# Patient Record
Sex: FEMALE | Race: WHITE | NOT HISPANIC OR LATINO | Employment: OTHER | URBAN - METROPOLITAN AREA
[De-identification: names, ages, dates, MRNs, and addresses within clinical notes are randomized per-mention and may not be internally consistent; named-entity substitution may affect disease eponyms.]

---

## 2019-04-24 ENCOUNTER — HOSPITAL ENCOUNTER (OUTPATIENT)
Dept: URGENT CARE | Facility: CLINIC | Age: 69
Discharge: HOME | End: 2019-04-24
Attending: FAMILY MEDICINE | Admitting: FAMILY MEDICINE

## 2020-06-09 ENCOUNTER — TRANSCRIBE ORDERS (OUTPATIENT)
Dept: PHYSICAL THERAPY | Facility: CLINIC | Age: 70
End: 2020-06-09

## 2020-06-09 DIAGNOSIS — M54.9 ACUTE RIGHT-SIDED BACK PAIN, UNSPECIFIED BACK LOCATION: Primary | ICD-10-CM

## 2020-06-16 ENCOUNTER — TREATMENT (OUTPATIENT)
Dept: PHYSICAL THERAPY | Facility: CLINIC | Age: 70
End: 2020-06-16

## 2020-06-16 DIAGNOSIS — M54.9 ACUTE RIGHT-SIDED BACK PAIN, UNSPECIFIED BACK LOCATION: Primary | ICD-10-CM

## 2020-06-16 PROCEDURE — 97162 PT EVAL MOD COMPLEX 30 MIN: CPT | Performed by: PHYSICAL THERAPIST

## 2020-06-16 PROCEDURE — 97110 THERAPEUTIC EXERCISES: CPT | Performed by: PHYSICAL THERAPIST

## 2020-06-16 NOTE — PROGRESS NOTES
Physical Therapy Initial Evaluation and Plan of Care    Patient: Mariajose Tabor   : 1950  Diagnosis/ICD-10 Code:  Acute right-sided low back pain, unspecified whether sciatica present [M54.5]  Referring practitioner: Didi Talbot MD  Date of Initial Visit: 2020  Today's Date: 2020  Patient seen for 1 sessions           Subjective Questionnaire: Oswestry: 20%      Subjective Evaluation    History of Present Illness  Mechanism of injury: Patient presents to physical therapy with chief complaint of pain in mid-back on right side.  Reports that symptoms started back in February.  Reports that she had a UTI in January and reports that back pain has not gone away since.  Denies mechanism of injury.  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  Patient reports that she was just diagnosed with osteopenia in her hips from DEXA scan. Patient denies pain at night, just has some pain in right shoulder when laying on it. Denies bowel and bladder changes in last 4 months (since back pain has been present). Patient reports that since , she had been sitting more.  Reports that she has recently been walking more and states she is feeling better.  States that her pain comes on gradually and she is forced to stop and take a break from activities to rest. Patient wishes to return to ADL's and walking/caring for grandchildren with less pain and limitation.  Denies N&T in BUE's and BLE's.  Reports some weakness and pain in right shoulder that has been present for the past 5 years.     Quality of life: good    Pain  Current pain ratin  At worst pain ratin  Location: right side thoracic spine that moves into underside of scapula  Quality: dull ache  Relieving factors: support and rest  Aggravating factors: sleeping, movement and squatting  Progression: no change    Social Support  Lives with: spouse    Diagnostic Tests  X-ray: abnormal    Patient Goals  Patient goals  for therapy: decreased pain and independence with ADLs/IADLs             Objective          Postural Observations  Seated posture: fair        Palpation     Right Tenderness of the rhomboids and teres major.     Active Range of Motion   Cervical/Thoracic Spine   Normal active range of motion    Thoracic   Left lateral flexion: with pain    Scapular Mobility   Left Shoulder   Scapular Mobility with Shoulder to 90° FF   Scapular winging: moderate    Scapular Mobility beyond 90° FF   Scapular winging: moderate    Right Shoulder   Scapular Mobility with Shoulder to 90° FF   Scapular winging: moderate    Scapular Mobility beyond 90° FF   Scapular winging: moderate  Upward rotation: excessive    Strength/Myotome Testing     Left Shoulder     Planes of Motion   Flexion: 4   Extension: 5   Abduction: 4     Right Shoulder     Planes of Motion   Flexion: 4   Extension: 5   Abduction: 4     Additional Strength Details   R: 40lbs    L: 45lbs           Assessment & Plan     Assessment  Impairments: abnormal or restricted ROM, activity intolerance, impaired physical strength, lacks appropriate home exercise program and pain with function  Assessment details: Patient presents to physical therapy with s/s congruent with [facility] diagnosis of back pain.  Patient demonstrates restricted mobility in thoracic spine, weakness in BUE's and tenderness to palpation in thoracic spine.  Patient would benefit from skilled physical therapy intervention in order to address these deficits so that she may complete ADL's with less pain and limitation.   Prognosis: good  Functional Limitations: carrying objects, lifting, walking, uncomfortable because of pain, sitting and standing  Goals  Plan Goals: In two weeks, patient will report at least 25% reduction in pain level in thoracic spine.   In two weeks, patient will demonstrate no restriction in AROM in thoracic spine.     In four weeks, patient will demonstrate 5/5 muscular strength in BUE's.    In four weeks, patient will report tolerating standing for at least 1 hour before having to sit due to back pain.   In four weeks, patient will report full day of no pain in thoracic spine.   In four weeks, patient will demonstrate decreased perceived disability by decreasing score on Oswestry by at least 10%. (20% on eval)    Plan  Therapy options: will be seen for skilled physical therapy services  Planned modality interventions: cryotherapy, TENS and thermotherapy (hydrocollator packs)  Planned therapy interventions: manual therapy, neuromuscular re-education, soft tissue mobilization, strengthening, therapeutic activities, home exercise program and functional ROM exercises  Frequency: 2x week  Duration in visits: 20        Manual Therapy:         mins  69928;  Therapeutic Exercise:    25     mins  75645;     Neuromuscular Yuliya:        mins  02288;    Therapeutic Activity:          mins  18562;     Gait Training:           mins  35622;     Ultrasound:          mins  63853;    Electrical Stimulation:         mins  75332 ( );  Dry Needling          mins self-pay    Timed Treatment:   25   mins   Total Treatment:     60   mins    PT SIGNATURE: Palmer Almendarez PT   DATE TREATMENT INITIATED: 6/16/2020    Initial Certification  Certification Period: 9/14/2020  I certify that the therapy services are furnished while this patient is under my care.  The services outlined above are required by this patient, and will be reviewed every 90 days.     PHYSICIAN: Didi Talbot MD      DATE:     Please sign and return via fax to [phone].. Thank you, [facility].

## 2020-06-22 ENCOUNTER — TREATMENT (OUTPATIENT)
Dept: PHYSICAL THERAPY | Facility: CLINIC | Age: 70
End: 2020-06-22

## 2020-06-22 DIAGNOSIS — M54.9 ACUTE RIGHT-SIDED BACK PAIN, UNSPECIFIED BACK LOCATION: Primary | ICD-10-CM

## 2020-06-22 PROCEDURE — 97110 THERAPEUTIC EXERCISES: CPT | Performed by: PHYSICAL THERAPIST

## 2020-06-22 PROCEDURE — 97140 MANUAL THERAPY 1/> REGIONS: CPT | Performed by: PHYSICAL THERAPIST

## 2020-06-22 NOTE — PROGRESS NOTES
Physical Therapy Daily Progress Note       Patient: Mariajose Tabor   : 1950  Diagnosis/ICD-10 Code:  Acute right-sided back pain, unspecified back location [M54.9]  Referring practitioner: Didi Talbot MD  Date of Initial Visit: Type: THERAPY  Noted: 2020  Today's Date: 2020  Patient seen for 2 sessions         Mariajose Tabor reports:  Having some pain in mid-back on right side.  Overall feeling better since starting exercise.     Objective   See Exercise, Manual, and Modality Logs for complete treatment.       Assessment/Plan     Tolerates manual therapy and exercise well this visit.  Requires moderate level of cueing for proper technique with scapular retraction.  Progressing well.     Progress per Plan of Care           Manual Therapy:    15     mins  35115;  Therapeutic Exercise:    25     mins  83698;     Neuromuscular Yuliya:        mins  66092;    Therapeutic Activity:          mins  91944;     Gait Training:           mins  11600;     Ultrasound:          mins  32006;    Electrical Stimulation:         mins  46453 ( );  Dry Needling          mins self-pay    Timed Treatment:   40   mins   Total Treatment:     40   mins    Palmer Almendarez PT  Physical Therapist

## 2020-06-24 ENCOUNTER — TREATMENT (OUTPATIENT)
Dept: PHYSICAL THERAPY | Facility: CLINIC | Age: 70
End: 2020-06-24

## 2020-06-24 DIAGNOSIS — M54.9 ACUTE RIGHT-SIDED BACK PAIN, UNSPECIFIED BACK LOCATION: Primary | ICD-10-CM

## 2020-06-24 PROCEDURE — 97110 THERAPEUTIC EXERCISES: CPT | Performed by: PHYSICAL THERAPIST

## 2020-06-24 NOTE — PROGRESS NOTES
Physical Therapy Daily Progress Note    Patient: Mariajose Tabor   : 1950  Diagnosis/ICD-10 Code:  Acute right-sided back pain, unspecified back location [M54.9]  Referring practitioner: Didi Tlabot MD  Date of Initial Visit: Type: THERAPY  Noted: 2020  Today's Date: 2020  Patient seen for 3 sessions         Mariajose Tabor reports: No pain reported since previous treatment.  Reports exercise has been going well and feels that she has recovered nicely.     Objective   See Exercise, Manual, and Modality Logs for complete treatment.       Assessment/Plan     Demonstrates proper technique with newly added home exercises.  Patient to continue with I HEP and return for treatment due to meeting goals and needing time for caring for .     Progress per Plan of Care           Manual Therapy:         mins  59040;  Therapeutic Exercise:    55     mins  59828;     Neuromuscular Yuliya:        mins  02087;    Therapeutic Activity:          mins  09025;     Gait Training:           mins  46869;     Ultrasound:          mins  06173;    Electrical Stimulation:         mins  41710 ( );  Dry Needling          mins self-pay    Timed Treatment:   55   mins   Total Treatment:     55   mins    Palmer Almendarez PT  Physical Therapist

## 2020-07-13 ENCOUNTER — TREATMENT (OUTPATIENT)
Dept: PHYSICAL THERAPY | Facility: CLINIC | Age: 70
End: 2020-07-13

## 2020-07-13 DIAGNOSIS — M54.9 ACUTE RIGHT-SIDED BACK PAIN, UNSPECIFIED BACK LOCATION: Primary | ICD-10-CM

## 2020-07-13 PROCEDURE — 97140 MANUAL THERAPY 1/> REGIONS: CPT | Performed by: PHYSICAL THERAPIST

## 2020-07-13 PROCEDURE — 97110 THERAPEUTIC EXERCISES: CPT | Performed by: PHYSICAL THERAPIST

## 2020-07-13 NOTE — PROGRESS NOTES
Physical Therapy Daily Progress Note    Patient: Mariajose Tabor   : 1950  Diagnosis/ICD-10 Code:  Acute right-sided back pain, unspecified back location [M54.9]  Referring practitioner: Didi Talbot MD  Date of Initial Visit: Type: THERAPY  Noted: 2020  Today's Date: 2020  Patient seen for 4 sessions         Mariajose Tabor reports:  Has been feeling pretty well, however had pain flare up again a few times last week.  Feeling well today, however wanted to do a few more treatments in order to avoid pain from returning.     Objective   See Exercise, Manual, and Modality Logs for complete treatment.       Assessment/Plan     Tolerates manual therapy well.  Noted soft tissue restriction medial to medial border to right scapula.  Patient requires 2-3 verbal cues for proper technique with exercises.  Progressing well.     Progress per Plan of Care           Manual Therapy:    15     mins  20878;  Therapeutic Exercise:    25     mins  32473;     Neuromuscular Yuliya:        mins  81016;    Therapeutic Activity:          mins  99990;     Gait Training:           mins  56269;     Ultrasound:          mins  71778;    Electrical Stimulation:         mins  89353 ( );  Dry Needling          mins self-pay    Timed Treatment:   40   mins   Total Treatment:     40   mins    Palmer Almendarez PT  Physical Therapist

## 2020-07-15 ENCOUNTER — TREATMENT (OUTPATIENT)
Dept: PHYSICAL THERAPY | Facility: CLINIC | Age: 70
End: 2020-07-15

## 2020-07-15 DIAGNOSIS — M54.9 ACUTE RIGHT-SIDED BACK PAIN, UNSPECIFIED BACK LOCATION: Primary | ICD-10-CM

## 2020-07-15 PROCEDURE — 97140 MANUAL THERAPY 1/> REGIONS: CPT | Performed by: PHYSICAL THERAPIST

## 2020-07-15 PROCEDURE — 97110 THERAPEUTIC EXERCISES: CPT | Performed by: PHYSICAL THERAPIST

## 2020-07-15 NOTE — PROGRESS NOTES
Physical Therapy Daily Progress Note        Patient: Mariajose Tabor   : 1950  Diagnosis/ICD-10 Code:  Acute right-sided back pain, unspecified back location [M54.9]  Referring practitioner: Didi Talbot MD  Date of Initial Visit: Type: THERAPY  Noted: 2020  Today's Date: 7/15/2020  Patient seen for 5 sessions         Mariajose Tabor reports:  Feeling well.  No pain in middle of back/right scapula.  Did not have to use self-massage with tennis ball technique.     Objective   See Exercise, Manual, and Modality Logs for complete treatment.       Assessment/Plan     Tolerate manual therapy to thoracic spine well.  Patient requires minimal cueing for proper technique with HEP    D/C to I HEP, follow-up if s/s return            Manual Therapy:    15     mins  51162;  Therapeutic Exercise:    25     mins  03044;     Neuromuscular Yuliya:        mins  14673;    Therapeutic Activity:          mins  36272;     Gait Training:           mins  76623;     Ultrasound:          mins  96465;    Electrical Stimulation:         mins  68074 ( );  Dry Needling          mins self-pay    Timed Treatment:  40   mins   Total Treatment:     40   mins    Palmer Almendarez PT  Physical Therapist

## 2020-08-03 ENCOUNTER — TREATMENT (OUTPATIENT)
Dept: PHYSICAL THERAPY | Facility: CLINIC | Age: 70
End: 2020-08-03

## 2020-08-03 DIAGNOSIS — M54.9 ACUTE RIGHT-SIDED BACK PAIN, UNSPECIFIED BACK LOCATION: Primary | ICD-10-CM

## 2020-08-03 PROCEDURE — 97140 MANUAL THERAPY 1/> REGIONS: CPT | Performed by: PHYSICAL THERAPIST

## 2020-08-03 PROCEDURE — 97110 THERAPEUTIC EXERCISES: CPT | Performed by: PHYSICAL THERAPIST

## 2020-08-03 NOTE — PROGRESS NOTES
Physical Therapy Daily Progress Note      Patient: Mariajose Tabor   : 1950  Diagnosis/ICD-10 Code:  Acute right-sided back pain, unspecified back location [M54.9]  Referring practitioner: Didi Talbot MD  Date of Initial Visit: Type: THERAPY  Noted: 2020  Today's Date: 8/3/2020  Patient seen for 6 sessions         Mariajose Tabor reports:  Pain in middle of back and right shoulder flared-up a little last week.  Reports that she's feeling a little better over the past few days.  Reports compliance with HEP.      Objective   See Exercise, Manual, and Modality Logs for complete treatment.       Assessment/Plan     Decreased discomfort reported post-tx.  Patient demonstrates proper technique with home exercises and exercise completed in clinic.  Progressing well.     Progress per Plan of Care           Manual Therapy:    15     mins  07840;  Therapeutic Exercise:    15     mins  03318;     Neuromuscular Yuliya:        mins  28628;    Therapeutic Activity:          mins  43333;     Gait Training:           mins  61036;     Ultrasound:          mins  22068;    Electrical Stimulation:         mins  61289 ( );  Dry Needling          mins self-pay    Timed Treatment:   30   mins   Total Treatment:     30   mins    Palmer Almendarez PT  Physical Therapist

## 2020-08-05 ENCOUNTER — TREATMENT (OUTPATIENT)
Dept: PHYSICAL THERAPY | Facility: CLINIC | Age: 70
End: 2020-08-05

## 2020-08-05 DIAGNOSIS — M54.9 ACUTE RIGHT-SIDED BACK PAIN, UNSPECIFIED BACK LOCATION: Primary | ICD-10-CM

## 2020-08-05 PROCEDURE — 97140 MANUAL THERAPY 1/> REGIONS: CPT | Performed by: PHYSICAL THERAPIST

## 2020-08-05 NOTE — PROGRESS NOTES
Physical Therapy Daily Progress Note    Patient: Mariajose Tabor   : 1950  Diagnosis/ICD-10 Code:  Acute right-sided back pain, unspecified back location [M54.9]  Referring practitioner: Didi Talbot MD  Date of Initial Visit: Type: THERAPY  Noted: 2020  Today's Date: 2020  Patient seen for 7 sessions         Mariajose Tabor reports: Feeling well this week.  Decreased symptoms since previous session.     Objective   See Exercise, Manual, and Modality Logs for complete treatment.       Assessment/Plan     Tolerates manual therapy well.  Discussed postural awareness.  Patient progressing well towards meeting goals for pain reduction.    Progress per Plan of Care           Manual Therapy:    25     mins  48047;  Therapeutic Exercise:    5     mins  25506;     Neuromuscular Yuliya:        mins  95805;    Therapeutic Activity:          mins  23999;     Gait Training:           mins  13399;     Ultrasound:          mins  25327;    Electrical Stimulation:         mins  34879 ( );  Dry Needling          mins self-pay    Timed Treatment:   30   mins   Total Treatment:     40   mins    Palmer Almendarez PT  Physical Therapist

## 2020-08-10 ENCOUNTER — TREATMENT (OUTPATIENT)
Dept: PHYSICAL THERAPY | Facility: CLINIC | Age: 70
End: 2020-08-10

## 2020-08-10 DIAGNOSIS — M54.9 ACUTE RIGHT-SIDED BACK PAIN, UNSPECIFIED BACK LOCATION: Primary | ICD-10-CM

## 2020-08-10 PROCEDURE — 97140 MANUAL THERAPY 1/> REGIONS: CPT | Performed by: PHYSICAL THERAPIST

## 2020-08-12 ENCOUNTER — TREATMENT (OUTPATIENT)
Dept: PHYSICAL THERAPY | Facility: CLINIC | Age: 70
End: 2020-08-12

## 2020-08-12 DIAGNOSIS — M54.9 ACUTE RIGHT-SIDED BACK PAIN, UNSPECIFIED BACK LOCATION: Primary | ICD-10-CM

## 2020-08-12 PROCEDURE — 97140 MANUAL THERAPY 1/> REGIONS: CPT | Performed by: PHYSICAL THERAPIST

## 2020-08-12 NOTE — PROGRESS NOTES
Physical Therapy Daily Progress Note    Patient: Mariajose Tabor   : 1950  Diagnosis/ICD-10 Code:  Acute right-sided back pain, unspecified back location [M54.9]  Referring practitioner: Didi Talbot MD  Date of Initial Visit: Type: THERAPY  Noted: 2020  Today's Date: 2020  Patient seen for 9 sessions         Mariajose Tabor reports:  Increased soreness in middle of back over the past few days.     Objective   See Exercise, Manual, and Modality Logs for complete treatment.       Assessment/Plan     Tolerates manual therapy well this visit.  Decreased pain reported post-tx.      Progress per Plan of Care           Manual Therapy:    25     mins  43475;  Therapeutic Exercise:         mins  13232;     Neuromuscular Yuliya:        mins  36201;    Therapeutic Activity:          mins  00416;     Gait Training:           mins  65523;     Ultrasound:          mins  60421;    Electrical Stimulation:         mins  22852 ( );  Dry Needling          mins self-pay    Timed Treatment:   25   mins   Total Treatment:     35   mins    Palmer Almendarez PT  Physical Therapist

## 2020-08-17 ENCOUNTER — TREATMENT (OUTPATIENT)
Dept: PHYSICAL THERAPY | Facility: CLINIC | Age: 70
End: 2020-08-17

## 2020-08-17 DIAGNOSIS — M54.9 ACUTE RIGHT-SIDED BACK PAIN, UNSPECIFIED BACK LOCATION: Primary | ICD-10-CM

## 2020-08-17 PROCEDURE — 97140 MANUAL THERAPY 1/> REGIONS: CPT | Performed by: PHYSICAL THERAPIST

## 2020-08-17 NOTE — PROGRESS NOTES
Physical Therapy Daily Progress Note    Patient: Mariajose Tabor   : 1950  Diagnosis/ICD-10 Code:  Acute right-sided back pain, unspecified back location [M54.9]  Referring practitioner: Didi Talbot MD  Date of Initial Visit: Type: THERAPY  Noted: 2020  Today's Date: 2020  Patient seen for 10 sessions         Mariajose Tabor reports: Had a few days with increased pain in right scapula since last visit.  Reports that symptoms seem to increase after activities such as household chores.    Objective   See Exercise, Manual, and Modality Logs for complete treatment.     Goals:   In two weeks, patient will report at least 25% reduction in pain level in thoracic spine. met  In two weeks, patient will demonstrate no restriction in AROM in thoracic spine. met    In four weeks, patient will demonstrate 5/5 muscular strength in BUE's. met  In four weeks, patient will report tolerating standing for at least 1 hour before having to sit due to back pain. met  In four weeks, patient will report full day of no pain in thoracic spine. NM  In four weeks, patient will demonstrate decreased perceived disability by decreasing score on Oswestry by at least 10%. (20% on eval) NM    Assessment/Plan    Patient tolerates manual therapy well.  Decreased pain reported post-tx.  Patient has met all but two LTG's.  Progressing well.     Progress per Plan of Care           Manual Therapy:    25     mins  55230;  Therapeutic Exercise:    5     mins  82441;     Neuromuscular Yuliya:        mins  92930;    Therapeutic Activity:          mins  80445;     Gait Training:           mins  79909;     Ultrasound:          mins  42668;    Electrical Stimulation:         mins  20573 ( );  Dry Needling          mins self-pay    Timed Treatment:   30   mins   Total Treatment:     40   mins    Palmer Almendarez PT  Physical Therapist

## 2020-08-24 ENCOUNTER — TREATMENT (OUTPATIENT)
Dept: PHYSICAL THERAPY | Facility: CLINIC | Age: 70
End: 2020-08-24

## 2020-08-24 DIAGNOSIS — M54.9 ACUTE RIGHT-SIDED BACK PAIN, UNSPECIFIED BACK LOCATION: Primary | ICD-10-CM

## 2020-08-24 PROCEDURE — 97140 MANUAL THERAPY 1/> REGIONS: CPT | Performed by: PHYSICAL THERAPIST

## 2020-08-24 NOTE — PROGRESS NOTES
Physical Therapy Daily Progress Note      Patient: Mariajose Tabor   : 1950  Diagnosis/ICD-10 Code:  Acute right-sided back pain, unspecified back location [M54.9]  Referring practitioner: Didi Talbot MD  Date of Initial Visit: Type: THERAPY  Noted: 2020  Today's Date: 2020  Patient seen for 11 sessions         Mariajose Tabor reports: Felt better over the weekend, however states that still has some pain on right side of cervical spine that mildly increases when she takes a deep breath.      Objective   See Exercise, Manual, and Modality Logs for complete treatment.       Assessment/Plan     Tolerates manual therapy well.  Patient to continue with I HEP over the rest of the week and assess symptoms.     Progress per Plan of Care           Manual Therapy:    30     mins  72547;  Therapeutic Exercise:         mins  01430;     Neuromuscular Yuliya:        mins  96728;    Therapeutic Activity:          mins  76151;     Gait Training:           mins  97582;     Ultrasound:          mins  06969;    Electrical Stimulation:         mins  02030 ( );  Dry Needling          mins self-pay    Timed Treatment:   30   mins   Total Treatment:     40   mins    Palmer Almendarez PT  Physical Therapist

## 2021-03-01 ENCOUNTER — AMBULATORY SURGICAL CENTER (OUTPATIENT)
Dept: URBAN - METROPOLITAN AREA SURGERY 17 | Facility: SURGERY | Age: 71
End: 2021-03-01

## 2021-03-01 ENCOUNTER — HOSPITAL ENCOUNTER (INPATIENT)
Facility: HOSPITAL | Age: 71
LOS: 7 days | Discharge: HOME | End: 2021-03-10
Attending: FAMILY MEDICINE | Admitting: FAMILY MEDICINE

## 2021-03-01 ENCOUNTER — OFFICE (OUTPATIENT)
Dept: URBAN - METROPOLITAN AREA PATHOLOGY 4 | Facility: PATHOLOGY | Age: 71
End: 2021-03-01

## 2021-03-01 ENCOUNTER — APPOINTMENT (OUTPATIENT)
Dept: CT IMAGING | Facility: HOSPITAL | Age: 71
End: 2021-03-01

## 2021-03-01 VITALS
RESPIRATION RATE: 23 BRPM | RESPIRATION RATE: 32 BRPM | RESPIRATION RATE: 28 BRPM | TEMPERATURE: 96 F | RESPIRATION RATE: 14 BRPM | OXYGEN SATURATION: 95 % | DIASTOLIC BLOOD PRESSURE: 102 MMHG | DIASTOLIC BLOOD PRESSURE: 40 MMHG | SYSTOLIC BLOOD PRESSURE: 139 MMHG | DIASTOLIC BLOOD PRESSURE: 28 MMHG | DIASTOLIC BLOOD PRESSURE: 51 MMHG | HEART RATE: 72 BPM | DIASTOLIC BLOOD PRESSURE: 46 MMHG | OXYGEN SATURATION: 94 % | RESPIRATION RATE: 22 BRPM | RESPIRATION RATE: 3 BRPM | HEART RATE: 95 BPM | SYSTOLIC BLOOD PRESSURE: 119 MMHG | SYSTOLIC BLOOD PRESSURE: 121 MMHG | HEART RATE: 84 BPM | SYSTOLIC BLOOD PRESSURE: 110 MMHG | DIASTOLIC BLOOD PRESSURE: 58 MMHG | DIASTOLIC BLOOD PRESSURE: 52 MMHG | DIASTOLIC BLOOD PRESSURE: 55 MMHG | SYSTOLIC BLOOD PRESSURE: 129 MMHG | SYSTOLIC BLOOD PRESSURE: 125 MMHG | HEART RATE: 73 BPM | OXYGEN SATURATION: 96 % | OXYGEN SATURATION: 93 % | SYSTOLIC BLOOD PRESSURE: 120 MMHG | WEIGHT: 142 LBS | DIASTOLIC BLOOD PRESSURE: 48 MMHG | HEART RATE: 74 BPM | HEART RATE: 81 BPM | HEART RATE: 71 BPM | RESPIRATION RATE: 24 BRPM | DIASTOLIC BLOOD PRESSURE: 68 MMHG | HEART RATE: 85 BPM | SYSTOLIC BLOOD PRESSURE: 109 MMHG | DIASTOLIC BLOOD PRESSURE: 44 MMHG | SYSTOLIC BLOOD PRESSURE: 111 MMHG | DIASTOLIC BLOOD PRESSURE: 56 MMHG | OXYGEN SATURATION: 100 % | SYSTOLIC BLOOD PRESSURE: 94 MMHG | SYSTOLIC BLOOD PRESSURE: 107 MMHG | RESPIRATION RATE: 18 BRPM | TEMPERATURE: 97 F | DIASTOLIC BLOOD PRESSURE: 54 MMHG | SYSTOLIC BLOOD PRESSURE: 114 MMHG | RESPIRATION RATE: 21 BRPM | HEART RATE: 91 BPM | HEIGHT: 63 IN | OXYGEN SATURATION: 99 % | RESPIRATION RATE: 17 BRPM | RESPIRATION RATE: 26 BRPM | HEART RATE: 80 BPM

## 2021-03-01 DIAGNOSIS — D12.2 BENIGN NEOPLASM OF ASCENDING COLON: ICD-10-CM

## 2021-03-01 DIAGNOSIS — K66.8 FREE INTRAPERITONEAL AIR: ICD-10-CM

## 2021-03-01 DIAGNOSIS — K63.1 BOWEL PERFORATION (HCC): ICD-10-CM

## 2021-03-01 DIAGNOSIS — Z86.010 PERSONAL HISTORY OF COLONIC POLYPS: ICD-10-CM

## 2021-03-01 DIAGNOSIS — R10.84 GENERALIZED ABDOMINAL PAIN: Primary | ICD-10-CM

## 2021-03-01 LAB
ALBUMIN SERPL-MCNC: 3.8 G/DL (ref 3.5–5.2)
ALBUMIN/GLOB SERPL: 1.6 G/DL
ALP SERPL-CCNC: 64 U/L (ref 39–117)
ALT SERPL W P-5'-P-CCNC: 11 U/L (ref 1–33)
ANION GAP SERPL CALCULATED.3IONS-SCNC: 9 MMOL/L (ref 5–15)
AST SERPL-CCNC: 20 U/L (ref 1–32)
BASOPHILS # BLD AUTO: 0 10*3/MM3 (ref 0–0.2)
BASOPHILS NFR BLD AUTO: 0.2 % (ref 0–1.5)
BILIRUB SERPL-MCNC: 0.3 MG/DL (ref 0–1.2)
BUN SERPL-MCNC: 8 MG/DL (ref 8–23)
BUN/CREAT SERPL: 11.8 (ref 7–25)
CALCIUM SPEC-SCNC: 8.1 MG/DL (ref 8.6–10.5)
CHLORIDE SERPL-SCNC: 106 MMOL/L (ref 98–107)
CO2 SERPL-SCNC: 25 MMOL/L (ref 22–29)
CREAT SERPL-MCNC: 0.68 MG/DL (ref 0.57–1)
D-LACTATE SERPL-SCNC: 0.7 MMOL/L (ref 0.5–2)
DEPRECATED RDW RBC AUTO: 47.7 FL (ref 37–54)
EOSINOPHIL # BLD AUTO: 0 10*3/MM3 (ref 0–0.4)
EOSINOPHIL NFR BLD AUTO: 0.2 % (ref 0.3–6.2)
ERYTHROCYTE [DISTWIDTH] IN BLOOD BY AUTOMATED COUNT: 14.9 % (ref 12.3–15.4)
GFR SERPL CREATININE-BSD FRML MDRD: 86 ML/MIN/1.73
GI HISTOLOGY: A. UNSPECIFIED: (no result)
GI HISTOLOGY: PDF REPORT: (no result)
GLOBULIN UR ELPH-MCNC: 2.4 GM/DL
GLUCOSE SERPL-MCNC: 83 MG/DL (ref 65–99)
HCT VFR BLD AUTO: 38.6 % (ref 34–46.6)
HGB BLD-MCNC: 12.7 G/DL (ref 12–15.9)
LYMPHOCYTES # BLD AUTO: 0.4 10*3/MM3 (ref 0.7–3.1)
LYMPHOCYTES NFR BLD AUTO: 4.6 % (ref 19.6–45.3)
MCH RBC QN AUTO: 30.7 PG (ref 26.6–33)
MCHC RBC AUTO-ENTMCNC: 33 G/DL (ref 31.5–35.7)
MCV RBC AUTO: 93.1 FL (ref 79–97)
MONOCYTES # BLD AUTO: 0.3 10*3/MM3 (ref 0.1–0.9)
MONOCYTES NFR BLD AUTO: 3.7 % (ref 5–12)
NEUTROPHILS NFR BLD AUTO: 7.1 10*3/MM3 (ref 1.7–7)
NEUTROPHILS NFR BLD AUTO: 91.3 % (ref 42.7–76)
NRBC BLD AUTO-RTO: 0.1 /100 WBC (ref 0–0.2)
PLATELET # BLD AUTO: 300 10*3/MM3 (ref 140–450)
PMV BLD AUTO: 6.7 FL (ref 6–12)
POTASSIUM SERPL-SCNC: 3.6 MMOL/L (ref 3.5–5.2)
PROT SERPL-MCNC: 6.2 G/DL (ref 6–8.5)
RBC # BLD AUTO: 4.15 10*6/MM3 (ref 3.77–5.28)
SARS-COV-2 RNA PNL SPEC NAA+PROBE: NOT DETECTED
SODIUM SERPL-SCNC: 140 MMOL/L (ref 136–145)
WBC # BLD AUTO: 7.8 10*3/MM3 (ref 3.4–10.8)

## 2021-03-01 PROCEDURE — G0378 HOSPITAL OBSERVATION PER HR: HCPCS

## 2021-03-01 PROCEDURE — 88305 TISSUE EXAM BY PATHOLOGIST: CPT | Performed by: INTERNAL MEDICINE

## 2021-03-01 PROCEDURE — 25010000002 PIPERACILLIN SOD-TAZOBACTAM PER 1 G: Performed by: NURSE PRACTITIONER

## 2021-03-01 PROCEDURE — U0005 INFEC AGEN DETEC AMPLI PROBE: HCPCS | Performed by: NURSE PRACTITIONER

## 2021-03-01 PROCEDURE — 25010000002 HYDROMORPHONE PER 4 MG: Performed by: NURSE PRACTITIONER

## 2021-03-01 PROCEDURE — U0003 INFECTIOUS AGENT DETECTION BY NUCLEIC ACID (DNA OR RNA); SEVERE ACUTE RESPIRATORY SYNDROME CORONAVIRUS 2 (SARS-COV-2) (CORONAVIRUS DISEASE [COVID-19]), AMPLIFIED PROBE TECHNIQUE, MAKING USE OF HIGH THROUGHPUT TECHNOLOGIES AS DESCRIBED BY CMS-2020-01-R: HCPCS | Performed by: NURSE PRACTITIONER

## 2021-03-01 PROCEDURE — 74177 CT ABD & PELVIS W/CONTRAST: CPT

## 2021-03-01 PROCEDURE — 85025 COMPLETE CBC W/AUTO DIFF WBC: CPT | Performed by: NURSE PRACTITIONER

## 2021-03-01 PROCEDURE — 83605 ASSAY OF LACTIC ACID: CPT | Performed by: FAMILY MEDICINE

## 2021-03-01 PROCEDURE — 25010000002 IOPAMIDOL 61 % SOLUTION: Performed by: NURSE PRACTITIONER

## 2021-03-01 PROCEDURE — 99284 EMERGENCY DEPT VISIT MOD MDM: CPT

## 2021-03-01 PROCEDURE — 80053 COMPREHEN METABOLIC PANEL: CPT | Performed by: NURSE PRACTITIONER

## 2021-03-01 PROCEDURE — 45385 COLONOSCOPY W/LESION REMOVAL: CPT | Mod: PT | Performed by: INTERNAL MEDICINE

## 2021-03-01 PROCEDURE — 25010000002 ONDANSETRON PER 1 MG: Performed by: NURSE PRACTITIONER

## 2021-03-01 RX ORDER — ONDANSETRON 2 MG/ML
4 INJECTION INTRAMUSCULAR; INTRAVENOUS ONCE
Status: COMPLETED | OUTPATIENT
Start: 2021-03-01 | End: 2021-03-01

## 2021-03-01 RX ORDER — BENAZEPRIL HYDROCHLORIDE 20 MG/1
20 TABLET ORAL DAILY
COMMUNITY

## 2021-03-01 RX ORDER — FLUTICASONE PROPIONATE 50 MCG
1 SPRAY, SUSPENSION (ML) NASAL DAILY
COMMUNITY
End: 2021-03-10 | Stop reason: HOSPADM

## 2021-03-01 RX ORDER — PROMETHAZINE HYDROCHLORIDE 12.5 MG/1
12.5 SUPPOSITORY RECTAL EVERY 6 HOURS PRN
Status: DISCONTINUED | OUTPATIENT
Start: 2021-03-01 | End: 2021-03-10 | Stop reason: HOSPADM

## 2021-03-01 RX ORDER — HYDROMORPHONE HCL 110MG/55ML
0.5 PATIENT CONTROLLED ANALGESIA SYRINGE INTRAVENOUS
Status: DISCONTINUED | OUTPATIENT
Start: 2021-03-01 | End: 2021-03-10 | Stop reason: HOSPADM

## 2021-03-01 RX ORDER — SODIUM CHLORIDE, SODIUM LACTATE, POTASSIUM CHLORIDE, CALCIUM CHLORIDE 600; 310; 30; 20 MG/100ML; MG/100ML; MG/100ML; MG/100ML
100 INJECTION, SOLUTION INTRAVENOUS CONTINUOUS
Status: DISCONTINUED | OUTPATIENT
Start: 2021-03-01 | End: 2021-03-02

## 2021-03-01 RX ORDER — ASPIRIN 81 MG/1
81 TABLET ORAL DAILY
COMMUNITY
End: 2021-07-07 | Stop reason: HOSPADM

## 2021-03-01 RX ORDER — MULTIPLE VITAMINS W/ MINERALS TAB 9MG-400MCG
1 TAB ORAL DAILY
COMMUNITY
End: 2021-03-10 | Stop reason: HOSPADM

## 2021-03-01 RX ORDER — GLUCOSAMINE/D3/BOSWELLIA SERRA 1500MG-400
5000 TABLET ORAL DAILY
COMMUNITY
End: 2021-03-10 | Stop reason: HOSPADM

## 2021-03-01 RX ORDER — CLOBETASOL PROPIONATE 0.5 MG/G
CREAM TOPICAL NIGHTLY
COMMUNITY

## 2021-03-01 RX ORDER — SODIUM CHLORIDE 0.9 % (FLUSH) 0.9 %
3-10 SYRINGE (ML) INJECTION AS NEEDED
Status: DISCONTINUED | OUTPATIENT
Start: 2021-03-01 | End: 2021-03-10 | Stop reason: HOSPADM

## 2021-03-01 RX ORDER — SODIUM CHLORIDE 0.9 % (FLUSH) 0.9 %
3 SYRINGE (ML) INJECTION EVERY 12 HOURS SCHEDULED
Status: DISCONTINUED | OUTPATIENT
Start: 2021-03-01 | End: 2021-03-10 | Stop reason: HOSPADM

## 2021-03-01 RX ORDER — MELOXICAM 15 MG/1
15 TABLET ORAL DAILY
COMMUNITY
End: 2021-07-07 | Stop reason: HOSPADM

## 2021-03-01 RX ORDER — OMEPRAZOLE 40 MG/1
40 CAPSULE, DELAYED RELEASE ORAL DAILY
COMMUNITY
End: 2021-07-07 | Stop reason: HOSPADM

## 2021-03-01 RX ORDER — HYDROMORPHONE HCL 110MG/55ML
0.5 PATIENT CONTROLLED ANALGESIA SYRINGE INTRAVENOUS ONCE
Status: COMPLETED | OUTPATIENT
Start: 2021-03-01 | End: 2021-03-01

## 2021-03-01 RX ORDER — NITROGLYCERIN 0.4 MG/1
0.4 TABLET SUBLINGUAL
Status: DISCONTINUED | OUTPATIENT
Start: 2021-03-01 | End: 2021-03-10 | Stop reason: HOSPADM

## 2021-03-01 RX ORDER — SODIUM CHLORIDE 0.9 % (FLUSH) 0.9 %
10 SYRINGE (ML) INJECTION AS NEEDED
Status: DISCONTINUED | OUTPATIENT
Start: 2021-03-01 | End: 2021-03-10 | Stop reason: HOSPADM

## 2021-03-01 RX ORDER — FAMOTIDINE 10 MG/ML
20 INJECTION, SOLUTION INTRAVENOUS EVERY 12 HOURS SCHEDULED
Status: DISCONTINUED | OUTPATIENT
Start: 2021-03-01 | End: 2021-03-04

## 2021-03-01 RX ORDER — MAGNESIUM SULFATE HEPTAHYDRATE 40 MG/ML
2 INJECTION, SOLUTION INTRAVENOUS AS NEEDED
Status: DISCONTINUED | OUTPATIENT
Start: 2021-03-01 | End: 2021-03-10 | Stop reason: HOSPADM

## 2021-03-01 RX ORDER — MULTIVIT-MIN/IRON/FOLIC ACID/K 18-600-40
500 CAPSULE ORAL DAILY
COMMUNITY
End: 2021-03-10 | Stop reason: HOSPADM

## 2021-03-01 RX ORDER — PROMETHAZINE HYDROCHLORIDE 12.5 MG/1
12.5 TABLET ORAL EVERY 6 HOURS PRN
Status: DISCONTINUED | OUTPATIENT
Start: 2021-03-01 | End: 2021-03-10 | Stop reason: HOSPADM

## 2021-03-01 RX ORDER — MAGNESIUM SULFATE HEPTAHYDRATE 40 MG/ML
4 INJECTION, SOLUTION INTRAVENOUS AS NEEDED
Status: DISCONTINUED | OUTPATIENT
Start: 2021-03-01 | End: 2021-03-10 | Stop reason: HOSPADM

## 2021-03-01 RX ORDER — AMITRIPTYLINE HYDROCHLORIDE 25 MG/1
25 TABLET, FILM COATED ORAL NIGHTLY
COMMUNITY

## 2021-03-01 RX ORDER — POTASSIUM CHLORIDE 7.45 MG/ML
10 INJECTION INTRAVENOUS
Status: DISCONTINUED | OUTPATIENT
Start: 2021-03-01 | End: 2021-03-10 | Stop reason: HOSPADM

## 2021-03-01 RX ORDER — CHLORAL HYDRATE 500 MG
2000 CAPSULE ORAL
COMMUNITY
End: 2021-03-10 | Stop reason: HOSPADM

## 2021-03-01 RX ADMIN — PIPERACILLIN AND TAZOBACTAM 3.38 G: 3; .375 INJECTION, POWDER, LYOPHILIZED, FOR SOLUTION INTRAVENOUS at 20:04

## 2021-03-01 RX ADMIN — SODIUM CHLORIDE, POTASSIUM CHLORIDE, SODIUM LACTATE AND CALCIUM CHLORIDE 100 ML/HR: 600; 310; 30; 20 INJECTION, SOLUTION INTRAVENOUS at 22:38

## 2021-03-01 RX ADMIN — SODIUM CHLORIDE, PRESERVATIVE FREE 3 ML: 5 INJECTION INTRAVENOUS at 22:38

## 2021-03-01 RX ADMIN — HYDROMORPHONE HYDROCHLORIDE 0.5 MG: 2 INJECTION, SOLUTION INTRAMUSCULAR; INTRAVENOUS; SUBCUTANEOUS at 20:03

## 2021-03-01 RX ADMIN — ONDANSETRON 4 MG: 2 INJECTION, SOLUTION INTRAMUSCULAR; INTRAVENOUS at 20:03

## 2021-03-01 RX ADMIN — FAMOTIDINE 20 MG: 10 INJECTION INTRAVENOUS at 22:38

## 2021-03-01 RX ADMIN — ONDANSETRON 4 MG: 2 INJECTION, SOLUTION INTRAMUSCULAR; INTRAVENOUS at 20:52

## 2021-03-01 RX ADMIN — IOPAMIDOL 100 ML: 612 INJECTION, SOLUTION INTRAVENOUS at 19:15

## 2021-03-01 NOTE — ED NOTES
Patient complains of generalized abdominal pain that is worse on the right side. Had colonoscopy and poly removed today around 8am. Patient called and talked to the nurse where procedure was done and was advised to go to the ER for further evaluation     Maxine Fletcher RN  03/01/21 3749

## 2021-03-02 ENCOUNTER — ANESTHESIA (OUTPATIENT)
Dept: PERIOP | Facility: HOSPITAL | Age: 71
End: 2021-03-02

## 2021-03-02 ENCOUNTER — ANESTHESIA EVENT (OUTPATIENT)
Dept: PERIOP | Facility: HOSPITAL | Age: 71
End: 2021-03-02

## 2021-03-02 PROBLEM — I10 HYPERTENSION: Status: ACTIVE | Noted: 2021-03-02

## 2021-03-02 LAB
ABO GROUP BLD: NORMAL
ANION GAP SERPL CALCULATED.3IONS-SCNC: 7 MMOL/L (ref 5–15)
BASOPHILS # BLD AUTO: 0 10*3/MM3 (ref 0–0.2)
BASOPHILS NFR BLD AUTO: 0.1 % (ref 0–1.5)
BLD GP AB SCN SERPL QL: NEGATIVE
BUN SERPL-MCNC: 8 MG/DL (ref 8–23)
BUN/CREAT SERPL: 12.9 (ref 7–25)
CALCIUM SPEC-SCNC: 8.1 MG/DL (ref 8.6–10.5)
CHLORIDE SERPL-SCNC: 106 MMOL/L (ref 98–107)
CO2 SERPL-SCNC: 25 MMOL/L (ref 22–29)
CREAT SERPL-MCNC: 0.62 MG/DL (ref 0.57–1)
DEPRECATED RDW RBC AUTO: 47.3 FL (ref 37–54)
EOSINOPHIL # BLD AUTO: 0 10*3/MM3 (ref 0–0.4)
EOSINOPHIL NFR BLD AUTO: 0 % (ref 0.3–6.2)
ERYTHROCYTE [DISTWIDTH] IN BLOOD BY AUTOMATED COUNT: 14.8 % (ref 12.3–15.4)
GFR SERPL CREATININE-BSD FRML MDRD: 96 ML/MIN/1.73
GLUCOSE SERPL-MCNC: 119 MG/DL (ref 65–99)
HCT VFR BLD AUTO: 33.5 % (ref 34–46.6)
HGB BLD-MCNC: 11.1 G/DL (ref 12–15.9)
LYMPHOCYTES # BLD AUTO: 0.4 10*3/MM3 (ref 0.7–3.1)
LYMPHOCYTES NFR BLD AUTO: 2.7 % (ref 19.6–45.3)
MAGNESIUM SERPL-MCNC: 1.8 MG/DL (ref 1.6–2.4)
MCH RBC QN AUTO: 30.7 PG (ref 26.6–33)
MCHC RBC AUTO-ENTMCNC: 33.2 G/DL (ref 31.5–35.7)
MCV RBC AUTO: 92.7 FL (ref 79–97)
MONOCYTES # BLD AUTO: 0.6 10*3/MM3 (ref 0.1–0.9)
MONOCYTES NFR BLD AUTO: 4.2 % (ref 5–12)
NEUTROPHILS NFR BLD AUTO: 12.7 10*3/MM3 (ref 1.7–7)
NEUTROPHILS NFR BLD AUTO: 93 % (ref 42.7–76)
NRBC BLD AUTO-RTO: 0.1 /100 WBC (ref 0–0.2)
PLATELET # BLD AUTO: 291 10*3/MM3 (ref 140–450)
PMV BLD AUTO: 7 FL (ref 6–12)
POTASSIUM SERPL-SCNC: 4.1 MMOL/L (ref 3.5–5.2)
RBC # BLD AUTO: 3.62 10*6/MM3 (ref 3.77–5.28)
RH BLD: POSITIVE
SODIUM SERPL-SCNC: 138 MMOL/L (ref 136–145)
T&S EXPIRATION DATE: NORMAL
WBC # BLD AUTO: 13.6 10*3/MM3 (ref 3.4–10.8)

## 2021-03-02 PROCEDURE — 25010000002 SUCCINYLCHOLINE PER 20 MG: Performed by: ANESTHESIOLOGY

## 2021-03-02 PROCEDURE — 86900 BLOOD TYPING SEROLOGIC ABO: CPT | Performed by: FAMILY MEDICINE

## 2021-03-02 PROCEDURE — 86900 BLOOD TYPING SEROLOGIC ABO: CPT

## 2021-03-02 PROCEDURE — 25010000002 PIPERACILLIN SOD-TAZOBACTAM PER 1 G: Performed by: SURGERY

## 2021-03-02 PROCEDURE — G0378 HOSPITAL OBSERVATION PER HR: HCPCS

## 2021-03-02 PROCEDURE — 88307 TISSUE EXAM BY PATHOLOGIST: CPT | Performed by: SURGERY

## 2021-03-02 PROCEDURE — 25010000002 HYDROMORPHONE PER 4 MG: Performed by: SURGERY

## 2021-03-02 PROCEDURE — 25010000002 MORPHINE PER 10 MG: Performed by: SURGERY

## 2021-03-02 PROCEDURE — 85025 COMPLETE CBC W/AUTO DIFF WBC: CPT | Performed by: FAMILY MEDICINE

## 2021-03-02 PROCEDURE — 25010000002 PROPOFOL 10 MG/ML EMULSION: Performed by: ANESTHESIOLOGY

## 2021-03-02 PROCEDURE — 25010000002 MIDAZOLAM PER 1 MG: Performed by: ANESTHESIOLOGY

## 2021-03-02 PROCEDURE — 0DTF0ZZ RESECTION OF RIGHT LARGE INTESTINE, OPEN APPROACH: ICD-10-PCS | Performed by: SURGERY

## 2021-03-02 PROCEDURE — 63710000001 PROMETHAZINE PER 12.5 MG: Performed by: FAMILY MEDICINE

## 2021-03-02 PROCEDURE — 99204 OFFICE O/P NEW MOD 45 MIN: CPT | Performed by: SURGERY

## 2021-03-02 PROCEDURE — 86901 BLOOD TYPING SEROLOGIC RH(D): CPT

## 2021-03-02 PROCEDURE — 87040 BLOOD CULTURE FOR BACTERIA: CPT | Performed by: FAMILY MEDICINE

## 2021-03-02 PROCEDURE — 25010000002 HYDROMORPHONE PER 4 MG: Performed by: FAMILY MEDICINE

## 2021-03-02 PROCEDURE — 83735 ASSAY OF MAGNESIUM: CPT | Performed by: FAMILY MEDICINE

## 2021-03-02 PROCEDURE — 25010000002 HYDROMORPHONE PER 4 MG: Performed by: ANESTHESIOLOGY

## 2021-03-02 PROCEDURE — 80048 BASIC METABOLIC PNL TOTAL CA: CPT | Performed by: FAMILY MEDICINE

## 2021-03-02 PROCEDURE — 86901 BLOOD TYPING SEROLOGIC RH(D): CPT | Performed by: FAMILY MEDICINE

## 2021-03-02 PROCEDURE — 25010000002 DEXAMETHASONE PER 1 MG: Performed by: ANESTHESIOLOGY

## 2021-03-02 PROCEDURE — 25010000002 FENTANYL CITRATE (PF) 100 MCG/2ML SOLUTION: Performed by: ANESTHESIOLOGY

## 2021-03-02 PROCEDURE — 86850 RBC ANTIBODY SCREEN: CPT | Performed by: FAMILY MEDICINE

## 2021-03-02 PROCEDURE — 25010000002 PIPERACILLIN SOD-TAZOBACTAM PER 1 G: Performed by: FAMILY MEDICINE

## 2021-03-02 PROCEDURE — 44160 REMOVAL OF COLON: CPT | Performed by: SURGERY

## 2021-03-02 PROCEDURE — 25010000002 ONDANSETRON PER 1 MG: Performed by: ANESTHESIOLOGY

## 2021-03-02 DEVICE — PROXIMATE RELOADABLE LINEAR CUTTER WITH SAFETY LOCK-OUT, 75MM
Type: IMPLANTABLE DEVICE | Site: ABDOMEN | Status: FUNCTIONAL
Brand: PROXIMATE

## 2021-03-02 DEVICE — PROXIMATE RELOADABLE LINEAR STAPLER
Type: IMPLANTABLE DEVICE | Site: ABDOMEN | Status: FUNCTIONAL
Brand: PROXIMATE

## 2021-03-02 DEVICE — PROXIMATE LINEAR CUTTER RELOAD, BLUE, 75MM
Type: IMPLANTABLE DEVICE | Site: ABDOMEN | Status: FUNCTIONAL
Brand: PROXIMATE

## 2021-03-02 RX ORDER — ONDANSETRON 2 MG/ML
INJECTION INTRAMUSCULAR; INTRAVENOUS AS NEEDED
Status: DISCONTINUED | OUTPATIENT
Start: 2021-03-02 | End: 2021-03-02 | Stop reason: SURG

## 2021-03-02 RX ORDER — ONDANSETRON 2 MG/ML
4 INJECTION INTRAMUSCULAR; INTRAVENOUS ONCE AS NEEDED
Status: DISCONTINUED | OUTPATIENT
Start: 2021-03-02 | End: 2021-03-02 | Stop reason: HOSPADM

## 2021-03-02 RX ORDER — HYDROMORPHONE HCL 110MG/55ML
PATIENT CONTROLLED ANALGESIA SYRINGE INTRAVENOUS AS NEEDED
Status: DISCONTINUED | OUTPATIENT
Start: 2021-03-02 | End: 2021-03-02 | Stop reason: SURG

## 2021-03-02 RX ORDER — FAMOTIDINE 20 MG/1
20 TABLET, FILM COATED ORAL 2 TIMES DAILY
Status: DISCONTINUED | OUTPATIENT
Start: 2021-03-02 | End: 2021-03-02 | Stop reason: SDUPTHER

## 2021-03-02 RX ORDER — IPRATROPIUM BROMIDE AND ALBUTEROL SULFATE 2.5; .5 MG/3ML; MG/3ML
3 SOLUTION RESPIRATORY (INHALATION) ONCE AS NEEDED
Status: DISCONTINUED | OUTPATIENT
Start: 2021-03-02 | End: 2021-03-02 | Stop reason: HOSPADM

## 2021-03-02 RX ORDER — OXYCODONE HYDROCHLORIDE 5 MG/1
10 TABLET ORAL EVERY 4 HOURS PRN
Status: DISPENSED | OUTPATIENT
Start: 2021-03-02 | End: 2021-03-09

## 2021-03-02 RX ORDER — NALOXONE HCL 0.4 MG/ML
0.4 VIAL (ML) INJECTION
Status: DISCONTINUED | OUTPATIENT
Start: 2021-03-02 | End: 2021-03-10 | Stop reason: HOSPADM

## 2021-03-02 RX ORDER — FENTANYL CITRATE 50 UG/ML
INJECTION, SOLUTION INTRAMUSCULAR; INTRAVENOUS AS NEEDED
Status: DISCONTINUED | OUTPATIENT
Start: 2021-03-02 | End: 2021-03-02 | Stop reason: SURG

## 2021-03-02 RX ORDER — NALOXONE HCL 0.4 MG/ML
0.1 VIAL (ML) INJECTION
Status: DISCONTINUED | OUTPATIENT
Start: 2021-03-02 | End: 2021-03-10 | Stop reason: HOSPADM

## 2021-03-02 RX ORDER — SODIUM CHLORIDE 9 MG/ML
100 INJECTION, SOLUTION INTRAVENOUS CONTINUOUS
Status: DISCONTINUED | OUTPATIENT
Start: 2021-03-02 | End: 2021-03-09

## 2021-03-02 RX ORDER — PROMETHAZINE HYDROCHLORIDE 12.5 MG/1
12.5 TABLET ORAL EVERY 6 HOURS PRN
Status: DISCONTINUED | OUTPATIENT
Start: 2021-03-02 | End: 2021-03-02

## 2021-03-02 RX ORDER — LIDOCAINE HYDROCHLORIDE 20 MG/ML
INJECTION, SOLUTION EPIDURAL; INFILTRATION; INTRACAUDAL; PERINEURAL AS NEEDED
Status: DISCONTINUED | OUTPATIENT
Start: 2021-03-02 | End: 2021-03-02 | Stop reason: SURG

## 2021-03-02 RX ORDER — FLUMAZENIL 0.1 MG/ML
0.1 INJECTION INTRAVENOUS AS NEEDED
Status: DISCONTINUED | OUTPATIENT
Start: 2021-03-02 | End: 2021-03-02 | Stop reason: HOSPADM

## 2021-03-02 RX ORDER — SODIUM CHLORIDE, SODIUM LACTATE, POTASSIUM CHLORIDE, CALCIUM CHLORIDE 600; 310; 30; 20 MG/100ML; MG/100ML; MG/100ML; MG/100ML
125 INJECTION, SOLUTION INTRAVENOUS CONTINUOUS
Status: DISCONTINUED | OUTPATIENT
Start: 2021-03-02 | End: 2021-03-05

## 2021-03-02 RX ORDER — MIDAZOLAM HYDROCHLORIDE 1 MG/ML
INJECTION INTRAMUSCULAR; INTRAVENOUS AS NEEDED
Status: DISCONTINUED | OUTPATIENT
Start: 2021-03-02 | End: 2021-03-02 | Stop reason: SURG

## 2021-03-02 RX ORDER — PROPOFOL 10 MG/ML
VIAL (ML) INTRAVENOUS AS NEEDED
Status: DISCONTINUED | OUTPATIENT
Start: 2021-03-02 | End: 2021-03-02 | Stop reason: SURG

## 2021-03-02 RX ORDER — LABETALOL HYDROCHLORIDE 5 MG/ML
5 INJECTION, SOLUTION INTRAVENOUS
Status: DISCONTINUED | OUTPATIENT
Start: 2021-03-02 | End: 2021-03-02 | Stop reason: HOSPADM

## 2021-03-02 RX ORDER — HYDROCODONE BITARTRATE AND ACETAMINOPHEN 5; 325 MG/1; MG/1
1 TABLET ORAL EVERY 4 HOURS PRN
Status: DISPENSED | OUTPATIENT
Start: 2021-03-02 | End: 2021-03-09

## 2021-03-02 RX ORDER — ROCURONIUM BROMIDE 10 MG/ML
INJECTION, SOLUTION INTRAVENOUS AS NEEDED
Status: DISCONTINUED | OUTPATIENT
Start: 2021-03-02 | End: 2021-03-02 | Stop reason: SURG

## 2021-03-02 RX ORDER — PHENYLEPHRINE HCL IN 0.9% NACL 1 MG/10 ML
SYRINGE (ML) INTRAVENOUS AS NEEDED
Status: DISCONTINUED | OUTPATIENT
Start: 2021-03-02 | End: 2021-03-02 | Stop reason: SURG

## 2021-03-02 RX ORDER — HYDROMORPHONE HCL 110MG/55ML
0.5 PATIENT CONTROLLED ANALGESIA SYRINGE INTRAVENOUS
Status: DISCONTINUED | OUTPATIENT
Start: 2021-03-02 | End: 2021-03-02 | Stop reason: HOSPADM

## 2021-03-02 RX ORDER — PROMETHAZINE HYDROCHLORIDE 25 MG/1
25 SUPPOSITORY RECTAL ONCE AS NEEDED
Status: DISCONTINUED | OUTPATIENT
Start: 2021-03-02 | End: 2021-03-02 | Stop reason: HOSPADM

## 2021-03-02 RX ORDER — DEXAMETHASONE SODIUM PHOSPHATE 4 MG/ML
INJECTION, SOLUTION INTRA-ARTICULAR; INTRALESIONAL; INTRAMUSCULAR; INTRAVENOUS; SOFT TISSUE AS NEEDED
Status: DISCONTINUED | OUTPATIENT
Start: 2021-03-02 | End: 2021-03-02 | Stop reason: SURG

## 2021-03-02 RX ORDER — MORPHINE SULFATE 4 MG/ML
4 INJECTION, SOLUTION INTRAMUSCULAR; INTRAVENOUS
Status: DISPENSED | OUTPATIENT
Start: 2021-03-02 | End: 2021-03-09

## 2021-03-02 RX ORDER — PROMETHAZINE HYDROCHLORIDE 12.5 MG/1
12.5 SUPPOSITORY RECTAL EVERY 6 HOURS PRN
Status: DISCONTINUED | OUTPATIENT
Start: 2021-03-02 | End: 2021-03-02

## 2021-03-02 RX ORDER — HYDROMORPHONE HCL 110MG/55ML
0.25 PATIENT CONTROLLED ANALGESIA SYRINGE INTRAVENOUS
Status: DISCONTINUED | OUTPATIENT
Start: 2021-03-02 | End: 2021-03-02 | Stop reason: HOSPADM

## 2021-03-02 RX ORDER — SUCCINYLCHOLINE CHLORIDE 20 MG/ML
INJECTION INTRAMUSCULAR; INTRAVENOUS AS NEEDED
Status: DISCONTINUED | OUTPATIENT
Start: 2021-03-02 | End: 2021-03-02 | Stop reason: SURG

## 2021-03-02 RX ORDER — ONDANSETRON 4 MG/1
4 TABLET, FILM COATED ORAL EVERY 6 HOURS PRN
Status: DISCONTINUED | OUTPATIENT
Start: 2021-03-02 | End: 2021-03-10 | Stop reason: HOSPADM

## 2021-03-02 RX ORDER — NALOXONE HCL 0.4 MG/ML
0.4 VIAL (ML) INJECTION AS NEEDED
Status: DISCONTINUED | OUTPATIENT
Start: 2021-03-02 | End: 2021-03-02 | Stop reason: HOSPADM

## 2021-03-02 RX ORDER — HYDROMORPHONE HCL 110MG/55ML
0.5 PATIENT CONTROLLED ANALGESIA SYRINGE INTRAVENOUS
Status: DISPENSED | OUTPATIENT
Start: 2021-03-02 | End: 2021-03-09

## 2021-03-02 RX ORDER — ONDANSETRON 2 MG/ML
4 INJECTION INTRAMUSCULAR; INTRAVENOUS EVERY 6 HOURS PRN
Status: DISCONTINUED | OUTPATIENT
Start: 2021-03-02 | End: 2021-03-10 | Stop reason: HOSPADM

## 2021-03-02 RX ORDER — PROMETHAZINE HYDROCHLORIDE 25 MG/1
25 TABLET ORAL ONCE AS NEEDED
Status: DISCONTINUED | OUTPATIENT
Start: 2021-03-02 | End: 2021-03-02 | Stop reason: HOSPADM

## 2021-03-02 RX ADMIN — DEXAMETHASONE SODIUM PHOSPHATE 4 MG: 4 INJECTION, SOLUTION INTRAMUSCULAR; INTRAVENOUS at 12:17

## 2021-03-02 RX ADMIN — PIPERACILLIN AND TAZOBACTAM 3.38 G: 3; .375 INJECTION, POWDER, LYOPHILIZED, FOR SOLUTION INTRAVENOUS at 04:33

## 2021-03-02 RX ADMIN — MORPHINE SULFATE 4 MG: 4 INJECTION INTRAVENOUS at 20:04

## 2021-03-02 RX ADMIN — PIPERACILLIN AND TAZOBACTAM 3.38 G: 3; .375 INJECTION, POWDER, LYOPHILIZED, FOR SOLUTION INTRAVENOUS at 12:09

## 2021-03-02 RX ADMIN — FAMOTIDINE 20 MG: 10 INJECTION INTRAVENOUS at 20:03

## 2021-03-02 RX ADMIN — SUCCINYLCHOLINE CHLORIDE 140 MG: 20 INJECTION, SOLUTION INTRAMUSCULAR; INTRAVENOUS at 11:40

## 2021-03-02 RX ADMIN — PROMETHAZINE HYDROCHLORIDE 12.5 MG: 12.5 TABLET ORAL at 04:46

## 2021-03-02 RX ADMIN — PIPERACILLIN AND TAZOBACTAM 3.38 G: 3; .375 INJECTION, POWDER, LYOPHILIZED, FOR SOLUTION INTRAVENOUS at 20:04

## 2021-03-02 RX ADMIN — PROPOFOL 150 MG: 10 INJECTION, EMULSION INTRAVENOUS at 11:40

## 2021-03-02 RX ADMIN — HYDROMORPHONE HYDROCHLORIDE 0.5 MG: 2 INJECTION, SOLUTION INTRAMUSCULAR; INTRAVENOUS; SUBCUTANEOUS at 23:32

## 2021-03-02 RX ADMIN — SODIUM CHLORIDE 100 ML/HR: 9 INJECTION, SOLUTION INTRAVENOUS at 15:53

## 2021-03-02 RX ADMIN — HYDROMORPHONE HYDROCHLORIDE 2 MG: 2 INJECTION INTRAMUSCULAR; INTRAVENOUS; SUBCUTANEOUS at 11:58

## 2021-03-02 RX ADMIN — SODIUM CHLORIDE, SODIUM LACTATE, POTASSIUM CHLORIDE, AND CALCIUM CHLORIDE: .6; .31; .03; .02 INJECTION, SOLUTION INTRAVENOUS at 11:28

## 2021-03-02 RX ADMIN — SODIUM CHLORIDE, PRESERVATIVE FREE 3 ML: 5 INJECTION INTRAVENOUS at 20:04

## 2021-03-02 RX ADMIN — ROCURONIUM BROMIDE 50 MG: 10 INJECTION INTRAVENOUS at 11:56

## 2021-03-02 RX ADMIN — Medication 200 MCG: at 11:56

## 2021-03-02 RX ADMIN — HYDROMORPHONE HYDROCHLORIDE 0.5 MG: 2 INJECTION, SOLUTION INTRAMUSCULAR; INTRAVENOUS; SUBCUTANEOUS at 18:33

## 2021-03-02 RX ADMIN — ONDANSETRON 4 MG: 2 INJECTION INTRAMUSCULAR; INTRAVENOUS at 12:36

## 2021-03-02 RX ADMIN — MIDAZOLAM 2 MG: 1 INJECTION INTRAMUSCULAR; INTRAVENOUS at 11:37

## 2021-03-02 RX ADMIN — Medication 200 MCG: at 11:51

## 2021-03-02 RX ADMIN — HYDROMORPHONE HYDROCHLORIDE 0.5 MG: 2 INJECTION, SOLUTION INTRAMUSCULAR; INTRAVENOUS; SUBCUTANEOUS at 04:32

## 2021-03-02 RX ADMIN — Medication 200 MCG: at 12:31

## 2021-03-02 RX ADMIN — HYDROMORPHONE HYDROCHLORIDE 0.5 MG: 2 INJECTION, SOLUTION INTRAMUSCULAR; INTRAVENOUS; SUBCUTANEOUS at 13:35

## 2021-03-02 RX ADMIN — SODIUM CHLORIDE, SODIUM LACTATE, POTASSIUM CHLORIDE, AND CALCIUM CHLORIDE: .6; .31; .03; .02 INJECTION, SOLUTION INTRAVENOUS at 12:04

## 2021-03-02 RX ADMIN — HYDROMORPHONE HYDROCHLORIDE 0.5 MG: 2 INJECTION, SOLUTION INTRAMUSCULAR; INTRAVENOUS; SUBCUTANEOUS at 13:50

## 2021-03-02 RX ADMIN — OXYCODONE 10 MG: 5 TABLET ORAL at 17:38

## 2021-03-02 RX ADMIN — FENTANYL CITRATE 100 MCG: 50 INJECTION, SOLUTION INTRAMUSCULAR; INTRAVENOUS at 11:37

## 2021-03-02 RX ADMIN — LIDOCAINE HYDROCHLORIDE 100 MG: 20 INJECTION, SOLUTION EPIDURAL; INFILTRATION; INTRACAUDAL; PERINEURAL at 11:40

## 2021-03-02 RX ADMIN — HYDROMORPHONE HYDROCHLORIDE 0.5 MG: 2 INJECTION, SOLUTION INTRAMUSCULAR; INTRAVENOUS; SUBCUTANEOUS at 13:15

## 2021-03-02 NOTE — PLAN OF CARE
Pt resting. Pt complaining of pain, treated per MAR. Pt ambulatory independently. Vitals stable. Pt to have general surgery consult in AM. Will continue to monitor.     Problem: Adult Inpatient Plan of Care  Goal: Plan of Care Review  3/2/2021 0109 by Goldie Crenshaw, RN  Outcome: Ongoing, Progressing  Flowsheets (Taken 3/2/2021 0109)  Progress: no change  Plan of Care Reviewed With: patient

## 2021-03-02 NOTE — CONSULTS
Subjective   Mariajose Tabor is a 70 y.o. female.     History of present illness  Mariajose is a pleasant 70-year-old female admitted through the emergency department late last night with abdominal pain and evidence of free air.  Patient underwent colonoscopy yesterday.  She has a long history of colon polyps and she had a 3 to 4cm polyp in the ascending colon which was removed in [place].  She had pain that began shortly after her colonoscopy and it was felt just likely to be due from gas.  However throughout the course the day her pain increased and she presented to the emergency department late last night.  She had some free air noted in the anterior abdomen likely from the polyp resection.  She has some minor nausea presently but no vomiting.    Past Medical History:   Diagnosis Date   • GERD (gastroesophageal reflux disease)    • Hypertension    • Migraine        Past Surgical History:   Procedure Laterality Date   • CATARACT EXTRACTION     • COLONOSCOPY     • DILATATION AND CURETTAGE     • HERNIA REPAIR         [unfilled]    No Known Allergies    History reviewed. No pertinent family history.    Social History     Socioeconomic History   • Marital status:      Spouse name: Not on file   • Number of children: Not on file   • Years of education: Not on file   • Highest education level: Not on file   Tobacco Use   • Smoking status: Never Smoker   Substance and Sexual Activity   • Alcohol use: Yes   • Drug use: Never   • Sexual activity: Defer       The following portions of the patient's history were reviewed and updated as appropriate: allergies, current medications, past family history, past medical history, past social history, past surgical history and problem list.    Objective     Complete review of systems is done and unremarkable exception the chief complaint    Physical exam shows a pleasant 70-year-old female.  HEENT is negative.  Heart is regular.  Lungs are clear.  Abdomen is soft.  She is quite  tender in the right abdomen primarily.  No palpable mass.  Extremities show equal range of motion and usage in the upper and lower extremities.  She has symmetrical strengths.  Neuro shows no obvious focal deficit.    I personally reviewed her laboratory data and CT scan    Impression: Perforation likely at the site of the polypectomy in the ascending colon.  With the size of the polyp being 4 cm there is at least a 50% chance this was malignant.    Recommendation: I have suggested that we do a laparotomy to inspect the area carefully and likely will do an ileocecectomy or possible right colon with anastomosis and placement of sump drains.  I discussed this in detail with her.  I have also discussed the option to keep her on antibiotics and just see how she does.  She is not in favor of that, she prefers to go to the operating room to fix the area.  Assessment/Plan                  Pedro Wren,   3/2/2021  06:22 EST

## 2021-03-02 NOTE — ANESTHESIA POSTPROCEDURE EVALUATION
Patient: Mariajose Tabor    Procedure Summary     Date: 03/02/21 Room / Location: [facility] OR 08 / [facility] MAIN OR    Anesthesia Start: 1128 Anesthesia Stop: 1310    Procedure: LAPAROTOMY EXPLORATORY with RIGHT COLON RESECTION (N/A Abdomen) Diagnosis:     Surgeon: Pedro Wren DO Provider: Aftab Lomeli MD    Anesthesia Type: general ASA Status: 3 - Emergent          Anesthesia Type: general    Vitals  Vitals Value Taken Time   /46 03/02/21 1358   Temp 97.3 °F (36.3 °C) 03/02/21 1306   Pulse 78 03/02/21 1402   Resp 11 03/02/21 1351   SpO2 96 % 03/02/21 1402   Vitals shown include unvalidated device data.        Post Anesthesia Care and Evaluation    Patient location during evaluation: PACU  Patient participation: complete - patient participated  Level of consciousness: awake  Pain scale: See nurse's notes for pain score.  Pain management: adequate  Airway patency: patent  Anesthetic complications: No anesthetic complications  PONV Status: none  Cardiovascular status: acceptable  Respiratory status: acceptable  Hydration status: acceptable    Comments: Patient seen and examined postoperatively; vital signs stable; SpO2 greater than or equal to 90%; cardiopulmonary status stable; nausea/vomiting adequately controlled; pain adequately controlled; no apparent anesthesia complications; patient discharged from anesthesia care when discharge criteria were met

## 2021-03-02 NOTE — PLAN OF CARE
Goal Outcome Evaluation:  Plan of Care Reviewed With: patient  Progress: no change  Outcome Summary: pt returned from surgery. TIKA drain in place. started on clear liquid diet.

## 2021-03-02 NOTE — PLAN OF CARE
Pt admitted to floor from ED. Will initiate plan of care.    Problem: Adult Inpatient Plan of Care  Goal: Plan of Care Review  Outcome: Ongoing, Progressing  Flowsheets (Taken 3/1/2021 5049)  Progress: no change  Plan of Care Reviewed With: patient

## 2021-03-02 NOTE — ANESTHESIA PROCEDURE NOTES
Airway  Urgency: elective    Date/Time: 3/2/2021 11:41 AM  Airway not difficult    General Information and Staff    Patient location during procedure: OR  Anesthesiologist: Aftab Lomeli MD    Indications and Patient Condition  Indications for airway management: airway protection    Preoxygenated: yes  MILS not maintained throughout  Mask difficulty assessment: 0 - not attempted    Final Airway Details  Final airway type: endotracheal airway      Successful airway: ETT  Cuffed: yes   Successful intubation technique: direct laryngoscopy and RSI  Facilitating devices/methods: cricoid pressure  Endotracheal tube insertion site: oral  Blade: Crispin  Blade size: 3  ETT size (mm): 8.0  Cormack-Lehane Classification: grade I - full view of glottis  Placement verified by: capnometry and palpation of cuff   Measured from: lips  ETT/EBT  to lips (cm): 22  Number of attempts at approach: 1  Assessment: lips, teeth, and gum same as pre-op and atraumatic intubation    Additional Comments  ASA monitors applied; preoxygenated with 100% FiO2 via anesthesia face mask; induction of general anesthesia; rapid sequence with cricoid; patient's position optimized; laryngoscopy; cuffed ETT lubricated with lidocaine jelly and placed into the trachea; cuff inflated to seal with minimally occlusive airway cuff pressure; ETT connected to anesthesia circuit; atraumatic/dentition in preoperative condition; ETT secured in place; correct placement in the trachea confirmed by bilateral chest rise, tube condensation, and return of EtCO2 > 30 mmHg x3

## 2021-03-02 NOTE — OP NOTE
LAPAROTOMY EXPLORATORY  Procedure Report    Patient Name:  Mariajose Tabor  YOB: 1950    Date of Surgery:  3/2/2021     Indications: Perforation of ascending colon    Pre-op Diagnosis:   Same       Post-Op Diagnosis Codes:  Same    Procedure/CPT® Codes:      Procedure(s):  LAPAROTOMY EXPLORATORY with RIGHT COLON RESECTION    Staff:  Surgeon(s):  Pedro Wren DO         Anesthesia: General    Anesthetist: Dr. Lomeli    Estimated Blood Loss: minimal    Implants:    Implant Name Type Inv. Item Serial No.  Lot No. LRB No. Used Action   STPLR LNR CUT PROX 75MM VICENTE [id number] - [id number] Implant STPLR LNR CUT PROX 75MM VICENTE [id number]  [employer]  DIV OF J AND J [id number] N/A 1 Implanted   RELOAD STPLR LNR CUT PROX 75MM VICNETE TCR75 - [id number] Implant RELOAD STPLR LNR CUT PROX 75MM VICENTE TCR75  [employer]  DIV OF J AND J [id number] N/A 1 Implanted   RELOAD STPLR LNR CUT PROX 75MM VICENTE TCR75 - [id number] Implant RELOAD STPLR LNR CUT PROX 75MM VICENTE TCR75  [employer]  DIV OF J AND J [id number] N/A 1 Implanted   STPLR LNR CUT 60MM VICENTE REG [id number] - [id number] Implant STPLR LNR CUT 60MM VICENTE REG [id number]  [employer]  DIV OF J AND J [id number] N/A 1 Implanted       Specimen:          Specimens     ID Source Type Tests Collected By Collected At Frozen?      A Large Intestine, Right / Ascending Colon Tissue · TISSUE PATHOLOGY EXAM   Pedro Wren DO 3/2/21 1210 No     Description: right colon and appendix    This specimen was not marked as sent.              Findings: Perforation of the ascending colon from polypectomy on colonoscopy yesterday    Complications: None    Description of Procedure: Ms. Tabor is a pleasant 70-year-old female who was admitted through the emergency department last night with increasing abdominal pain throughout the day after undergoing a colonoscopy in [place] yesterday morning.  She had a 4 cm polyp in the ascending colon which was removed and I suspect that  her area of her perforation is located there.  I recommended that she undergo laparotomy with right colon resection simply because with a polyp that large there is a good likelihood there are malignant cells in the polyp.  The other option would have been to treat her with antibiotics and see how she does and we did discuss that but she did not want to do that.  So for all those reasons she is brought to the operating room today.    Patient was taken to the operating room placed in the supine position.  General was done by Dr. Carvajal.  Timeout done and identity verified.  Abdomen prepped and draped after 3-minute dry time.  Midline incision was made from 3 inches above the umbilicus to 2 inches below the umbilicus.  Incision was carried through the skin with a 10 blade and then carried deeper with cautery through all additional layers.  Once in the abdominal cavity we noted some mild peritonitis and we were able to identify the area of the perforation and the posterior wall of the ascending colon.  There was some air trapped in the bowel wall as well.  We then decided to do a right colon resection just on the outside chance that it would be a cancer and due to her history of severe constipation.  The terminal ileum was divided with a TEJAS and the cut ends treated with Betadine the transverse colon just past the hepatic flexure is then divided with a TEJAS and cutting treated with Betadine as well the mesentery supplying this portion of bowel was then taken down using the Enseal device.  We then brought the terminal ileum into juxtaposition with the transverse colon and the proposed anastomosis was lined up with interrupted 3-0 silk sutures.  Bowel clamps were then placed across the colon and small bowel to prevent any spillage in the area of the proposed anastomosis was walled off with wet towel.  We then made an enterotomy in both the small bowel and in the colon and 1 limb of the TEJAS was passed down each it was then  closed and fired creating a stapled side-to-side but functional end-to-end type anastomosis.  The enterotomy was then elevated with 3 Allis clamps and closed with a TA 60.  The staple line was then oversewn with interrupted 3 oh silks throughout and the rent in the mesentery closed with interrupted 3 oh silks as well.  The abdominal cavity was then copiously irrigated with 3 to 4 L of saline and suctioned free.  We then placed a vial of irrisept into the abdominal cavity and let it sit for 2 minutes.  While it was sitting then a 15 Isael drain was brought out through the right abdominal wall and secured with a 2-0 nylon.  We then evacuated the Irrisept and the entire team then changed gown and gloves for clean closure technique.  The abdominal wall was then closed in a single layer using running #1 Ethibond suture.  Subcu was then irrigated with a second vial of irrisept and then skin approximated with skin stapler.  Sterile dressing was applied.  She was awakened and transferred to recovery in satisfactory condition.  The final sponge instrument and needle counts were correct.  Of note, there was no lymphadenopathy no other sign of any malignancy.       was responsible for performing the following activities: Retraction, Suction, Closing and Placing Dressing and their skilled assistance was necessary for the success of this case.    Pedro Wren,      Date: 3/2/2021  Time: 12:56 EST

## 2021-03-02 NOTE — ED NOTES
Patients oxygen was at 87-88%. Placed on 2L N/C and oxygen level back up to 95%     Maxine Fletcher RN  03/01/21 2020

## 2021-03-02 NOTE — H&P
Patient Care Team:  Didi Talbot MD as PCP - General  Didi Talbot MD as PCP - Family Medicine    Chief Conplaint  Subjective     The patient is a 70 y.o. female presents with acute abdominal pain with evidence of an acute bowel perforation after elective colonoscopy.    HPI  The patient was in her usual state of health until the day of presentation when the patient had a scheduled high risk elective colonoscopy with polypectomy.  The patient immediately began to experience some discomfort and was observed.  She was deemed stable for discharge home but began to experience some progressive abdominal discomfort which was refractory to over-the-counter medications.  She presented to the [facility] emergency room for evaluation where she was found to have acute bowel perforation with pneumoperitoneum.  The patient was admitted and parenteral pain control, antibiotics, and bowel rest were initiated.  At the time my evaluation, she is feeling slightly better but continues to have abdominal pain and is, understandably, quite emotional.  She denies hyperpyrexia or shaking chills.  He denies shortness of breath substernal chest pain hemoptysis or other constitutional complaint.    Review of Systems  Review of Systems   Constitutional: Positive for activity change and appetite change.   HENT: Negative.    Respiratory: Negative.    Cardiovascular: Negative.    Gastrointestinal: Positive for abdominal pain. Negative for abdominal distention and nausea.   Genitourinary: Negative.    Musculoskeletal: Negative.    Neurological: Positive for weakness.   Psychiatric/Behavioral: The patient is nervous/anxious.        History  Past Medical History:   Diagnosis Date   • GERD (gastroesophageal reflux disease)    • Hypertension    • Migraine      Past Surgical History:   Procedure Laterality Date   • CATARACT EXTRACTION     • COLONOSCOPY     • DILATATION AND CURETTAGE     • HERNIA REPAIR       History reviewed. No pertinent  family history.  Social History     Tobacco Use   • Smoking status: Never Smoker   Substance Use Topics   • Alcohol use: Yes   • Drug use: Never     Allergies:  Patient has no known allergies.    Objective     Vital Signs  Temp:  [97.6 °F (36.4 °C)-98.4 °F (36.9 °C)] 98.4 °F (36.9 °C)  Heart Rate:  [] 107  Resp:  [13-16] 16  BP: (109-149)/(47-76) 149/76      Physical Exam:   Physical Exam  Vitals signs and nursing note reviewed.   HENT:      Head: Normocephalic and atraumatic.      Mouth/Throat:      Mouth: Mucous membranes are moist.   Cardiovascular:      Rate and Rhythm: Normal rate.      Heart sounds: Normal heart sounds.   Pulmonary:      Breath sounds: Normal breath sounds.   Abdominal:      General: There is no distension.      Palpations: Abdomen is soft. There is no mass.      Tenderness: There is abdominal tenderness. There is no guarding or rebound.      Hernia: No hernia is present.   Musculoskeletal: Normal range of motion.   Skin:     General: Skin is warm.   Neurological:      General: No focal deficit present.      Mental Status: She is alert and oriented to person, place, and time. Mental status is at baseline.              Results Review:   CBC    Results from last 7 days   Lab Units 03/02/21  0230 03/01/21  1743   WBC 10*3/mm3 13.60* 7.80   HEMOGLOBIN g/dL 11.1* 12.7   PLATELETS 10*3/mm3 291 300     BMP   Results from last 7 days   Lab Units 03/02/21  0230 03/01/21  1743   SODIUM mmol/L 138 140   POTASSIUM mmol/L 4.1 3.6   CHLORIDE mmol/L 106 106   CO2 mmol/L 25.0 25.0   BUN mg/dL 8 8   CREATININE mg/dL 0.62 0.68   GLUCOSE mg/dL 119* 83   MAGNESIUM mg/dL 1.8  --      Cr Clearance Estimated Creatinine Clearance: 61.7 mL/min (by C-G formula based on SCr of 0.62 mg/dL).  Coag     HbA1C No results found for: HGBA1C  Blood Glucose No results found for: POCGLU  Infection     CMP   Results from last 7 days   Lab Units 03/02/21  0230 03/01/21  1743   SODIUM mmol/L 138 140   POTASSIUM mmol/L 4.1 3.6    CHLORIDE mmol/L 106 106   CO2 mmol/L 25.0 25.0   BUN mg/dL 8 8   CREATININE mg/dL 0.62 0.68   GLUCOSE mg/dL 119* 83   ALBUMIN g/dL  --  3.80   BILIRUBIN mg/dL  --  0.3   ALK PHOS U/L  --  64   AST (SGOT) U/L  --  20   ALT (SGPT) U/L  --  11     UA      Radiology(recent) Ct Abdomen Pelvis With Contrast    Result Date: 3/1/2021  1. There is a small amount of free air, compatible with bowel perforation (unless there has been recent surgery.) There is also a small/moderate amount of free fluid. This was discussed with Sarah Goodrich. 2. There is sigmoid diverticulosis. This makes it difficult to exclude a small amount of extraluminal gas around the sigmoid colon in the pelvis, although there is no one focal area of suspicion. 3. The thickened appearance of the wall of the gastric antrum may be due to gastritis or contraction and clinical correlation is recommended..  Electronically Signed By-Caridad Dover MD On:3/1/2021 7:43 PM This report was finalized on [id number] by  Caridad Dover MD.       Assessment:      Generalized abdominal pain      Acute abdominal pain secondary to acute bowel perforation  Acute pneumoperitoneum  Status post elective colonoscopy with polypectomy of a 3.5 cm polyp 3/1/2021  Primary essential hypertension  Gastroesophageal reflux disease without esophagitis  Perennial allergic rhinitis  Degenerative disc disease cervical spine  Raynaud's disease  History of mixed migraine headaches      Plan:  Elective hemicolectomy//parenteral antimicrobial therapy//bowel rest//pain control  Expected Length of Stay 4 days    I discussed the patients findings and my recommendations with patient and nursing staff.     Ke Talbot MD  03/02/21  07:00 EST

## 2021-03-02 NOTE — PROGRESS NOTES
Discharge Planning Assessment  [facility]     Patient Name: Mariajose Tabor  MRN: [id number]  Today's Date: 3/2/2021    Admit Date: 3/1/2021    Discharge Needs Assessment     Row Name 03/02/21 1312       Living Environment    Lives With  spouse    Current Living Arrangements  home/apartment/condo    Primary Care Provided by  self    Provides Primary Care For  no one    Family Caregiver if Needed  spouse    Quality of Family Relationships  helpful;involved;supportive    Able to Return to Prior Arrangements  yes       Resource/Environmental Concerns    Resource/Environmental Concerns  none    Transportation Concerns  car, none       Transition Planning    Patient/Family Anticipates Transition to  home with family    Patient/Family Anticipated Services at Transition  none    Transportation Anticipated  family or friend will provide       Discharge Needs Assessment    Readmission Within the Last 30 Days  no previous admission in last 30 days    Equipment Currently Used at Home  none    Concerns to be Addressed  no discharge needs identified;denies needs/concerns at this time    Anticipated Changes Related to Illness  none    Equipment Needed After Discharge  none    Discharge Coordination/Progress  Anticipate routine discharge home with spouse.        Discharge Plan     Row Name 03/02/21 1313       Plan    Plan  Anticipate routine discharge home with spouse.    Patient/Family in Agreement with Plan  yes    Plan Comments  Spoke with patient's spouse. Spouse verified the PCP, pharmacy, and no current DME. Denies any problems affording food or medications at this time. Patient is vry independent. Barriers to discharge: Exploratory Lap today. LAPAROTOMY EXPLORATORY with RIGHT COLON RESECTION.        Continued Care and Services - Admitted Since 3/1/2021    Coordination has not been started for this encounter.       Expected Discharge Date and Time     Expected Discharge Date Expected Discharge Time    Mar 5, 2021          Demographic Summary     Row Name 03/02/21 1311       General Information    Admission Type  observation    Arrived From  emergency department    Required Notices Provided  Observation Status Notice    Referral Source  admission list    Reason for Consult  discharge planning    Preferred Language  English     Used During This Interaction  no       Contact Information    Permission Granted to Share Info With          Functional Status     Row Name 03/02/21 1311       Functional Status    Usual Activity Tolerance  good    Current Activity Tolerance  good       Functional Status, IADL    Medications  independent    Meal Preparation  independent    Housekeeping  independent    Laundry  independent    Shopping  independent          Patient Forms     Row Name 03/02/21 1315       Patient Forms    Patient Observation Letter  Delivered Lambert given 3/1            Met with patient in room wearing PPE: mask, face shield/goggles, gloves, gown.      Maintained distance greater than six feet and spent less than 15 minutes in the room.        Anna Naegele [place] [address]   [phone]  office  [phone]  fax  [email or web address]

## 2021-03-02 NOTE — ANESTHESIA PREPROCEDURE EVALUATION
Anesthesia Evaluation     Patient summary reviewed and Nursing notes reviewed   no history of anesthetic complications:  NPO Solid Status: > 8 hours  NPO Liquid Status: > 8 hours           Airway   Dental      Pulmonary    Cardiovascular     PT is on anticoagulation therapy    (+) hypertension,       Neuro/Psych  (+) headaches,     GI/Hepatic/Renal/Endo    (+)  GERD,      Musculoskeletal     Abdominal    Substance History      OB/GYN          Other   blood dyscrasia anemia,     ROS/Med Hx Other: Migraines, vertigo    PSH  COLONOSCOPY HERNIA REPAIR  DILATATION AND CURETTAGE CATARACT EXTRACTION                      Anesthesia Plan    ASA 3 - emergent     general   (Patient identified; pre-operative vital signs, all relevant labs/studies, complete medical/surgical/anesthetic history, full medication list, full allergy list, and NPO status obtained/reviewed; physical assessment performed; anesthetic options, side effects, potential complications, risks, and benefits discussed; questions answered; written anesthesia consent obtained; patient cleared for procedure; anesthesia machine and equipment checked and functioning)  intravenous induction     Anesthetic plan, all risks, benefits, and alternatives have been provided, discussed and informed consent has been obtained with: patient.

## 2021-03-02 NOTE — ED PROVIDER NOTES
Subjective   Patient is a 70-year-old white female with history of hypertension who presents today with complaints of abdominal pain.  She states she had a routine colonoscopy at 8:00 this morning.  She states that they did remove a polyp.  She states immediately postoperatively she did have some pain in her abdomen.  She states she was told at that time that it was due to the gas that was instilled for the procedure.  She states she was able to pass some gas after she got home but continued to have pain that is worsened throughout the day.  She complains of generalized abdominal pain that is worse right-sided.  She denies any fever chills nausea vomiting diarrhea black or bloody stool.  She denies any chest pain or shortness of breath.          Review of Systems   Constitutional: Negative for chills and fever.   Respiratory: Negative for cough and shortness of breath.    Cardiovascular: Negative for chest pain.   Gastrointestinal: Positive for abdominal pain. Negative for blood in stool, constipation, diarrhea, nausea and vomiting.   Genitourinary: Negative for difficulty urinating.   Musculoskeletal: Negative for back pain.   Neurological: Negative for dizziness and weakness.       Past Medical History:   Diagnosis Date   • GERD (gastroesophageal reflux disease)    • Hypertension    • Migraine        No Known Allergies    Past Surgical History:   Procedure Laterality Date   • CATARACT EXTRACTION     • COLONOSCOPY     • DILATATION AND CURETTAGE     • HERNIA REPAIR         History reviewed. No pertinent family history.    Social History     Socioeconomic History   • Marital status:      Spouse name: Not on file   • Number of children: Not on file   • Years of education: Not on file   • Highest education level: Not on file   Tobacco Use   • Smoking status: Never Smoker   Substance and Sexual Activity   • Alcohol use: Yes   • Drug use: Never   • Sexual activity: Defer           Objective   Physical Exam  Vital  signs and triage nurse note reviewed.  Constitutional: Awake, alert; well-developed and well-nourished. No acute distress is noted.  HEENT: Normocephalic, atraumatic; pupils are PERRL with intact EOM; oropharynx is pink and moist without exudate or erythema.  No drooling or pooling of oral secretions.  Neck: Supple, full range of motion without pain; no cervical lymphadenopathy. Normal phonation.  Cardiovascular: Regular rate and rhythm, normal S1-S2.  No murmur noted.  Pulmonary: Respiratory effort regular nonlabored, breath sounds clear to auscultation all fields.  Abdomen: Soft, mildly tender diffusely, nondistended with normoactive bowel sounds; mild voluntary guarding.  No rebound.  Musculoskeletal: Independent range of motion of all extremities with no palpable tenderness or edema.  Neuro: Alert oriented x3, speech is clear and appropriate, GCS 15.    Skin: Flesh tone, warm, dry, intact; no erythematous or petechial rash or lesion.      Procedures           ED Course      Labs Reviewed   COMPREHENSIVE METABOLIC PANEL - Abnormal; Notable for the following components:       Result Value    Calcium 8.1 (*)     All other components within normal limits    Narrative:     GFR Normal >60  Chronic Kidney Disease <60  Kidney Failure <15     CBC WITH AUTO DIFFERENTIAL - Abnormal; Notable for the following components:    Neutrophil % 91.3 (*)     Lymphocyte % 4.6 (*)     Monocyte % 3.7 (*)     Eosinophil % 0.2 (*)     Neutrophils, Absolute 7.10 (*)     Lymphocytes, Absolute 0.40 (*)     All other components within normal limits   COVID PRE-OP / PRE-PROCEDURE SCREENING ORDER (NO ISOLATION)    Narrative:     The following orders were created for panel order COVID PRE-OP / PRE-PROCEDURE SCREENING ORDER (NO ISOLATION) - Swab, Nasopharynx.  Procedure                               Abnormality         Status                     ---------                               -----------         ------                      COVID-19,CEPHEID,COR/TALAT...[[id number]]                                                   Please view results for these tests on the individual orders.   COVID-19,CEPHEID,COR/TALAT/PAD IN-HOUSE(OR EMERGENT/ADD-ON),NP SWAB IN TRANSPORT MEDIA 3-4 HR TAT, RT-PCR   CBC AND DIFFERENTIAL    Narrative:     The following orders were created for panel order CBC & Differential.  Procedure                               Abnormality         Status                     ---------                               -----------         ------                     CBC Auto Differential[[id number]]        Abnormal            Final result                 Please view results for these tests on the individual orders.     Ct Abdomen Pelvis With Contrast    Result Date: 3/1/2021  1. There is a small amount of free air, compatible with bowel perforation (unless there has been recent surgery.) There is also a small/moderate amount of free fluid. This was discussed with Sarah Goodrich. 2. There is sigmoid diverticulosis. This makes it difficult to exclude a small amount of extraluminal gas around the sigmoid colon in the pelvis, although there is no one focal area of suspicion. 3. The thickened appearance of the wall of the gastric antrum may be due to gastritis or contraction and clinical correlation is recommended..  Electronically Signed By-Caridad Dover MD On:3/1/2021 7:43 PM This report was finalized on [id number] by  Caridad Dover MD.    Medications   sodium chloride 0.9 % flush 10 mL (has no administration in time range)   iopamidol (ISOVUE-300) 61 % injection 100 mL (100 mL Intravenous Given 3/1/21 1915)   piperacillin-tazobactam (ZOSYN) IVPB 3.375 g in 100 mL NS (CD) (3.375 g Intravenous New Bag 3/1/21 2004)   HYDROmorphone (DILAUDID) injection 0.5 mg (0.5 mg Intravenous Given 3/1/21 2003)   ondansetron (ZOFRAN) injection 4 mg (4 mg Intravenous Given 3/1/21 2003)                                          MDM  Number of Diagnoses or  Management Options  Bowel perforation (CMS/HCC):   Free intraperitoneal air:   Generalized abdominal pain:   Diagnosis management comments: Comorbidities: Hypertension  Differentials: Perforation, obstruction,;this list is not all inclusive and does not constitute the entirety of considered causes  Discussion with provider:  Radiology interpretation: X-rays reviewed by me and interpreted by radiologist: As above  Lab interpretation: Labs viewed by me significant for: As above    Patient had IV established.  She had labs and CT obtained.  She was given Dilaudid for pain.    Work-up: CBC and metabolic panel are grossly unremarkable.  She has a normal white blood cell count.  I was notified by the radiologist of patient CT findings.  There does appear to be free intraperitoneal air suggestive of bowel perforation.  There is also small to moderate amount of free fluid.    Patient was given a dose of IV Zosyn.  She was discussed with Dr. Wren, on-call general surgeon, who agrees to consult.  She was also discussed with Dr. Alcantar, primary provider on-call for Dr. Talbot.    She is remained hemodynamically stable throughout her ED stay.  She is in no acute distress and is stable vital signs.    Diagnosis and treatment plan discussed with patient.  Patient agreeable to plan.            Amount and/or Complexity of Data Reviewed  Clinical lab tests: ordered and reviewed  Tests in the radiology section of CPT®: ordered and reviewed    Patient Progress  Patient progress: stable      Final diagnoses:   Generalized abdominal pain   Bowel perforation (CMS/HCC)   Free intraperitoneal air            Sarah Goodrich APRN  03/01/21 2004

## 2021-03-03 LAB
ANION GAP SERPL CALCULATED.3IONS-SCNC: 10 MMOL/L (ref 5–15)
BASOPHILS # BLD AUTO: 0 10*3/MM3 (ref 0–0.2)
BASOPHILS NFR BLD AUTO: 0.1 % (ref 0–1.5)
BUN SERPL-MCNC: 11 MG/DL (ref 8–23)
BUN/CREAT SERPL: 17.5 (ref 7–25)
CALCIUM SPEC-SCNC: 8.1 MG/DL (ref 8.6–10.5)
CHLORIDE SERPL-SCNC: 103 MMOL/L (ref 98–107)
CO2 SERPL-SCNC: 23 MMOL/L (ref 22–29)
CREAT SERPL-MCNC: 0.63 MG/DL (ref 0.57–1)
DEPRECATED RDW RBC AUTO: 50.3 FL (ref 37–54)
EOSINOPHIL # BLD AUTO: 0 10*3/MM3 (ref 0–0.4)
EOSINOPHIL NFR BLD AUTO: 0 % (ref 0.3–6.2)
ERYTHROCYTE [DISTWIDTH] IN BLOOD BY AUTOMATED COUNT: 15.5 % (ref 12.3–15.4)
GFR SERPL CREATININE-BSD FRML MDRD: 94 ML/MIN/1.73
GLUCOSE SERPL-MCNC: 99 MG/DL (ref 65–99)
HCT VFR BLD AUTO: 34.1 % (ref 34–46.6)
HGB BLD-MCNC: 11.3 G/DL (ref 12–15.9)
LYMPHOCYTES # BLD AUTO: 0.6 10*3/MM3 (ref 0.7–3.1)
LYMPHOCYTES NFR BLD AUTO: 5.5 % (ref 19.6–45.3)
MCH RBC QN AUTO: 30.8 PG (ref 26.6–33)
MCHC RBC AUTO-ENTMCNC: 33 G/DL (ref 31.5–35.7)
MCV RBC AUTO: 93.4 FL (ref 79–97)
MONOCYTES # BLD AUTO: 0.7 10*3/MM3 (ref 0.1–0.9)
MONOCYTES NFR BLD AUTO: 6 % (ref 5–12)
NEUTROPHILS NFR BLD AUTO: 88.4 % (ref 42.7–76)
NEUTROPHILS NFR BLD AUTO: 9.6 10*3/MM3 (ref 1.7–7)
NRBC BLD AUTO-RTO: 0 /100 WBC (ref 0–0.2)
PLATELET # BLD AUTO: 302 10*3/MM3 (ref 140–450)
PMV BLD AUTO: 7.3 FL (ref 6–12)
POTASSIUM SERPL-SCNC: 4.1 MMOL/L (ref 3.5–5.2)
RBC # BLD AUTO: 3.65 10*6/MM3 (ref 3.77–5.28)
SODIUM SERPL-SCNC: 136 MMOL/L (ref 136–145)
WBC # BLD AUTO: 10.9 10*3/MM3 (ref 3.4–10.8)

## 2021-03-03 PROCEDURE — 80048 BASIC METABOLIC PNL TOTAL CA: CPT | Performed by: SURGERY

## 2021-03-03 PROCEDURE — 99024 POSTOP FOLLOW-UP VISIT: CPT | Performed by: SURGERY

## 2021-03-03 PROCEDURE — 63710000001 PROMETHAZINE PER 12.5 MG: Performed by: SURGERY

## 2021-03-03 PROCEDURE — 97161 PT EVAL LOW COMPLEX 20 MIN: CPT

## 2021-03-03 PROCEDURE — 63710000001 ONDANSETRON PER 8 MG: Performed by: SURGERY

## 2021-03-03 PROCEDURE — 25010000002 ONDANSETRON PER 1 MG: Performed by: SURGERY

## 2021-03-03 PROCEDURE — 25010000002 HYDROMORPHONE PER 4 MG: Performed by: SURGERY

## 2021-03-03 PROCEDURE — 25010000002 PIPERACILLIN SOD-TAZOBACTAM PER 1 G: Performed by: SURGERY

## 2021-03-03 PROCEDURE — 97530 THERAPEUTIC ACTIVITIES: CPT

## 2021-03-03 PROCEDURE — 85025 COMPLETE CBC W/AUTO DIFF WBC: CPT | Performed by: SURGERY

## 2021-03-03 RX ORDER — SCOLOPAMINE TRANSDERMAL SYSTEM 1 MG/1
1 PATCH, EXTENDED RELEASE TRANSDERMAL
Status: DISCONTINUED | OUTPATIENT
Start: 2021-03-03 | End: 2021-03-10 | Stop reason: HOSPADM

## 2021-03-03 RX ADMIN — HYDROMORPHONE HYDROCHLORIDE 0.5 MG: 2 INJECTION, SOLUTION INTRAMUSCULAR; INTRAVENOUS; SUBCUTANEOUS at 03:28

## 2021-03-03 RX ADMIN — FAMOTIDINE 20 MG: 10 INJECTION INTRAVENOUS at 20:26

## 2021-03-03 RX ADMIN — OXYCODONE 10 MG: 5 TABLET ORAL at 21:17

## 2021-03-03 RX ADMIN — PIPERACILLIN AND TAZOBACTAM 3.38 G: 3; .375 INJECTION, POWDER, LYOPHILIZED, FOR SOLUTION INTRAVENOUS at 03:28

## 2021-03-03 RX ADMIN — PROMETHAZINE HYDROCHLORIDE 12.5 MG: 12.5 TABLET ORAL at 11:04

## 2021-03-03 RX ADMIN — OXYCODONE 10 MG: 5 TABLET ORAL at 06:26

## 2021-03-03 RX ADMIN — ONDANSETRON HYDROCHLORIDE 4 MG: 4 TABLET, FILM COATED ORAL at 16:54

## 2021-03-03 RX ADMIN — OXYCODONE 10 MG: 5 TABLET ORAL at 00:30

## 2021-03-03 RX ADMIN — PIPERACILLIN AND TAZOBACTAM 3.38 G: 3; .375 INJECTION, POWDER, LYOPHILIZED, FOR SOLUTION INTRAVENOUS at 11:04

## 2021-03-03 RX ADMIN — SCOPALAMINE 1 PATCH: 1 PATCH, EXTENDED RELEASE TRANSDERMAL at 13:03

## 2021-03-03 RX ADMIN — PIPERACILLIN AND TAZOBACTAM 3.38 G: 3; .375 INJECTION, POWDER, LYOPHILIZED, FOR SOLUTION INTRAVENOUS at 20:26

## 2021-03-03 RX ADMIN — ONDANSETRON 4 MG: 2 INJECTION INTRAMUSCULAR; INTRAVENOUS at 09:48

## 2021-03-03 RX ADMIN — OXYCODONE 10 MG: 5 TABLET ORAL at 11:04

## 2021-03-03 RX ADMIN — SODIUM CHLORIDE, PRESERVATIVE FREE 3 ML: 5 INJECTION INTRAVENOUS at 20:27

## 2021-03-03 RX ADMIN — SODIUM CHLORIDE 100 ML/HR: 9 INJECTION, SOLUTION INTRAVENOUS at 09:48

## 2021-03-03 RX ADMIN — FAMOTIDINE 20 MG: 10 INJECTION INTRAVENOUS at 08:17

## 2021-03-03 RX ADMIN — HYDROMORPHONE HYDROCHLORIDE 0.5 MG: 2 INJECTION, SOLUTION INTRAMUSCULAR; INTRAVENOUS; SUBCUTANEOUS at 08:17

## 2021-03-03 RX ADMIN — OXYCODONE 10 MG: 5 TABLET ORAL at 16:54

## 2021-03-03 NOTE — PLAN OF CARE
Goal Outcome Evaluation:  Plan of Care Reviewed With: patient, spouse  Progress: no change  Outcome Summary: Pt is 71yo F POD 1 from R colon resection. Pt has TIKA drain in place. Pt lives with  and is independent in all activities, including bathing, dressing, housework and driving. Pt did not use an AD prior to admit, however notes access to RW if needed. Pt on room air upon entry with O2 sats >95%. Pt currently requires modA for bed mobility for assist in stabilizing the trunk due to pain. Pt educated on techniques to minimize strain placed on abdomen, as well as importance of mobility to restore normal bladder/bowel function.  Pt reports nausea when sitting EOB, however agreeable to attempting to use restroom. Pt requires CGA + 2-wheeled walker for STS transfer and ambulation during evaluation. Pt is antalgic with mobility and slow paced. Upon standing, pt reports nausea is worse and doesn't believe she can make it to the bathroom. [place] [facility] [place] at this time to [place]inate. Pt then transfers to recliner chair.  Pt reports challenge of movement at this time due to pain and nausea. NSG notified and present to administer meds. Plan for routine d/c home with , where he can provide as much assistance as needed. Due to limited assessment of mobility, will keep pt on schedule throughout stay to promote safety and mobility to ensure easy transition home. Anticipate steady progress with mobility. PPE: gloves, mask, goggles

## 2021-03-03 NOTE — PROGRESS NOTES
LOS: 0 days   Patient Care Team:  Didi Talbot MD as PCP - General  Didi Talbot MD as PCP - Family Medicine    Subjective:  Events noted//complains of pain this morning and cramping    Objective:   Afebrile      Review of Systems:   Review of Systems   Constitutional: Positive for activity change.   HENT: Negative.    Respiratory: Negative.    Cardiovascular: Negative.    Gastrointestinal: Positive for abdominal distention and abdominal pain.   Neurological: Positive for weakness.   Psychiatric/Behavioral: The patient is nervous/anxious.            Vital Signs  Temp:  [97.3 °F (36.3 °C)-99.2 °F (37.3 °C)] 98.4 °F (36.9 °C)  Heart Rate:  [72-99] 92  Resp:  [8-27] 18  BP: (113-168)/(46-74) 124/67    Physical Exam:  Physical Exam  Vitals signs and nursing note reviewed.   Constitutional:       General: She is in acute distress.   Cardiovascular:      Rate and Rhythm: Normal rate.   Pulmonary:      Effort: Pulmonary effort is normal.   Abdominal:      Palpations: Abdomen is soft.      Tenderness: There is abdominal tenderness. There is no guarding.   Skin:     General: Skin is warm.   Neurological:      General: No focal deficit present.      Mental Status: She is alert and oriented to person, place, and time.          Radiology:  Ct Abdomen Pelvis With Contrast    Result Date: 3/1/2021  1. There is a small amount of free air, compatible with bowel perforation (unless there has been recent surgery.) There is also a small/moderate amount of free fluid. This was discussed with Sarah Goodrich. 2. There is sigmoid diverticulosis. This makes it difficult to exclude a small amount of extraluminal gas around the sigmoid colon in the pelvis, although there is no one focal area of suspicion. 3. The thickened appearance of the wall of the gastric antrum may be due to gastritis or contraction and clinical correlation is recommended..  Electronically Signed By-Caridad Dover MD On:3/1/2021 7:43 PM This report was finalized  on [id number] by  Caridad Dover MD.         Results Review:     I reviewed the patient's new clinical results.  I reviewed the patient's new imaging results and agree with the interpretation.    Medication Review:   Scheduled Meds:famotidine, 20 mg, Intravenous, Q12H  piperacillin-tazobactam, 3.375 g, Intravenous, Q8H  sodium chloride, 3 mL, Intravenous, Q12H      Continuous Infusions:lactated ringers, 125 mL/hr  sodium chloride, 100 mL/hr, Last Rate: 100 mL/hr (03/02/21 1553)      PRN Meds:.HYDROcodone-acetaminophen  •  HYDROmorphone  •  HYDROmorphone **AND** naloxone  •  magnesium sulfate **OR** magnesium sulfate **OR** magnesium sulfate  •  Morphine **AND** naloxone  •  nitroglycerin  •  ondansetron **OR** ondansetron  •  oxyCODONE  •  potassium chloride  •  promethazine **OR** promethazine  •  [COMPLETED] Insert peripheral IV **AND** sodium chloride  •  sodium chloride    Labs:    CBC    Results from last 7 days   Lab Units 03/03/21  0209 03/02/21 0230 03/01/21  1743   WBC 10*3/mm3 10.90* 13.60* 7.80   HEMOGLOBIN g/dL 11.3* 11.1* 12.7   PLATELETS 10*3/mm3 302 291 300     BMP   Results from last 7 days   Lab Units 03/03/21  0209 03/02/21 0230 03/01/21  1743   SODIUM mmol/L 136 138 140   POTASSIUM mmol/L 4.1 4.1 3.6   CHLORIDE mmol/L 103 106 106   CO2 mmol/L 23.0 25.0 25.0   BUN mg/dL 11 8 8   CREATININE mg/dL 0.63 0.62 0.68   GLUCOSE mg/dL 99 119* 83   MAGNESIUM mg/dL  --  1.8  --      Cr Clearance Estimated Creatinine Clearance: 61.7 mL/min (by C-G formula based on SCr of 0.63 mg/dL).  Coag     HbA1C No results found for: HGBA1C  Blood Glucose No results found for: POCGLU  Infection     CMP   Results from last 7 days   Lab Units 03/03/21  0209 03/02/21 0230 03/01/21  1743   SODIUM mmol/L 136 138 140   POTASSIUM mmol/L 4.1 4.1 3.6   CHLORIDE mmol/L 103 106 106   CO2 mmol/L 23.0 25.0 25.0   BUN mg/dL 11 8 8   CREATININE mg/dL 0.63 0.62 0.68   GLUCOSE mg/dL 99 119* 83   ALBUMIN g/dL  --   --  3.80    BILIRUBIN mg/dL  --   --  0.3   ALK PHOS U/L  --   --  64   AST (SGOT) U/L  --   --  20   ALT (SGPT) U/L  --   --  11     UA      Radiology(recent) Ct Abdomen Pelvis With Contrast    Result Date: 3/1/2021  1. There is a small amount of free air, compatible with bowel perforation (unless there has been recent surgery.) There is also a small/moderate amount of free fluid. This was discussed with Sarah Goodrich. 2. There is sigmoid diverticulosis. This makes it difficult to exclude a small amount of extraluminal gas around the sigmoid colon in the pelvis, although there is no one focal area of suspicion. 3. The thickened appearance of the wall of the gastric antrum may be due to gastritis or contraction and clinical correlation is recommended..  Electronically Signed By-Caridad Dover MD On:3/1/2021 7:43 PM This report was finalized on [id number] by  Caridad Dover MD.     Assessment:    Acute abdominal pain secondary to acute bowel perforation  Status post elective exploratory laparotomy with right colon resection 3/2/2021  Acute pneumoperitoneum  Status post elective colonoscopy with polypectomy of a 3.5 cm polyp 3/1/2021  Primary essential hypertension  Gastroesophageal reflux disease without esophagitis  Perennial allergic rhinitis  Degenerative disc disease cervical spine  Raynaud's disease  History of mixed migraine headaches    Plan:  Postoperative pathway//pain control        Ke Talbot MD  03/03/21  06:34 EST

## 2021-03-03 NOTE — THERAPY EVALUATION
Patient Name: Mariajose Tabor  : 1950    MRN: [id number]                              Today's Date: 3/3/2021       Admit Date: 3/1/2021    Visit Dx:     ICD-10-CM ICD-9-CM   1. Generalized abdominal pain  R10.84 789.07   2. Bowel perforation (CMS/HCC)  K63.1 569.83   3. Free intraperitoneal air  K66.8 568.89     Patient Active Problem List   Diagnosis   • Generalized abdominal pain   • Benign positional vertigo   • Hypertension   • Umbilical hernia without obstruction or gangrene     Past Medical History:   Diagnosis Date   • GERD (gastroesophageal reflux disease)    • Hypertension    • Migraine      Past Surgical History:   Procedure Laterality Date   • CATARACT EXTRACTION     • COLONOSCOPY     • DILATATION AND CURETTAGE     • EXPLORATORY LAPAROTOMY N/A 3/2/2021    Procedure: LAPAROTOMY EXPLORATORY with RIGHT COLON RESECTION;  Surgeon: Pedro Wren DO;  Location: [facility] MAIN OR;  Service: General;  Laterality: N/A;   • HERNIA REPAIR       General Information     Row Name 21 0957          Physical Therapy Time and Intention    Document Type  evaluation  -BELLA sims) MD (t) BELLA (c)     Mode of Treatment  physical therapy  -BELLA aguayo (r)) BELLA (c)     Row Name 21 0957          General Information    Patient Profile Reviewed  yes  -BELLA sims) MD (t) BELLA (c)     Prior Level of Function  independent:;all household mobility;dressing;bathing;driving;ADL's;home management Pt does not use AD for ambulation, although believes has access to RW if needed.  -BELLA aguayo (r)) BELLA (c)     Existing Precautions/Restrictions  no known precautions/restrictions  -BELLA Vanessa (r)t) BELLA (c)     Barriers to Rehab  none identified  -BELLA Vanessa (r)t) SS (c)     Row Name 21 09          Living Environment    Lives With  spouse Pt lives with spouse, who is retired and able to provide assistance when necessary.  -BELLA Vanessa (r)t) SS (c)     Row Name 21 0926          Home Main Entrance    Number of Stairs, Main Entrance  two  -BELLA STODDARD (r)  (t) SS (c)     Stair Railings, Main Entrance  none  -SS (r) MD (t) SS (c)     Row Name 03/03/21 0957          Stairs Within Home, Primary    Number of Stairs, Within Home, Primary  none  -SS (r) MD (t) SS (c)     Row Name 03/03/21 0957          Cognition    Orientation Status (Cognition)  oriented x 4  -SS (r) MD (t) SS (c)     Row Name 03/03/21 0957          Safety Issues, Functional Mobility    Impairments Affecting Function (Mobility)  balance;endurance/activity tolerance;pain  -SS (r) MD (t) SS (c)       User Key  (r) = Recorded By, (t) = Taken By, (c) = Cosigned By    Initials Name Provider Type    SS Xuan Flowers, PT Physical Therapist    Ann Marie Cervantes, PT Student PT Student        Mobility     Row Name 03/03/21 1000          Bed Mobility    Bed Mobility  bed mobility (all) activities  -SS (r) MD (t) SS (c)     All Activities, [place] (Bed Mobility)  moderate assist (50% patient effort) Pt required assistance with trunk to move from supine to sitting EOB position. Pt educated on importance of log rolling to minimize strain placed on abdomen.  -SS (r) MD (t) SS (c)     Assistive Device (Bed Mobility)  bed rails;head of bed elevated  -SS (r) MD (t) SS (c)     Row Name 03/03/21 1000          Sit-Stand Transfer    Sit-Stand [place] (Transfers)  contact guard  -SS (r) MD (t) SS (c)     Assistive Device (Sit-Stand Transfers)  walker, front-wheeled  -SS (r) MD (t) SS (c)     Row Name 03/03/21 1000          Gait/Stairs (Locomotion)    [place] Level (Gait)  contact guard  -SS (r) MD (t) SS (c)     Assistive Device (Gait)  walker, front-wheeled  -SS (r) MD (t) SS (c)     Distance in Feet (Gait)  2  -SS (r) MD (t) SS (c)     [place] Level (Stairs)  not tested  -SS (r) MD (t) SS (c)     Comment (Gait/Stairs)  Pt able to take a few steps to BSC and recliner chair, however further gait not attempted this date due to pt pain and reports of significant nausea.  -BELLA (r) MD (t) BELLA (c)        User Key  (r) = Recorded By, (t) = Taken By, (c) = Cosigned By    Initials Name Provider Type    SS Xuan Flowers, PT Physical Therapist    Ann Marie Cervantes, PT Student PT Student        Obj/Interventions     Row Name 03/03/21 1034          Range of Motion Comprehensive    General Range of Motion  no range of motion deficits identified  -SS (r) MD (t) SS (c)     Row Name 03/03/21 1034          Strength Comprehensive (MMT)    General Manual Muscle Testing (MMT) Assessment  no strength deficits identified  -SS (r) MD (t) SS (c)     Row Name 03/03/21 1034          Sensory Assessment (Somatosensory)    Sensory Assessment (Somatosensory)  sensation intact  -SS (r) MD (t) SS (c)       User Key  (r) = Recorded By, (t) = Taken By, (c) = Cosigned By    Initials Name Provider Type    SS Xuan Flowers, PT Physical Therapist    Ann Marie Cervantes, PT Student PT Student        Goals/Plan     Row Name 03/03/21 1238          Bed Mobility Goal 1 (PT)    Activity/Assistive Device (Bed Mobility Goal 1, PT)  bed mobility activities, all  -SS (r) MD (t) SS (c)     [place] Level/Cues Needed (Bed Mobility Goal 1, PT)  independent  -SS (r) MD (t) SS (c)     Time Frame (Bed Mobility Goal 1, PT)  long term goal (LTG);2 weeks  -SS (r) MD (t) SS (c)     Row Name 03/03/21 1238          Transfer Goal 1 (PT)    Activity/Assistive Device (Transfer Goal 1, PT)  transfers, all;walker, rolling  -SS (r) MD (t) SS (c)     [place] Level/Cues Needed (Transfer Goal 1, PT)  modified independence  -SS (r) MD (t) SS (c)     Time Frame (Transfer Goal 1, PT)  long term goal (LTG);2 weeks  -SS (r) MD (t) SS (c)     Row Name 03/03/21 1238          Gait Training Goal 1 (PT)    Activity/Assistive Device (Gait Training Goal 1, PT)  gait (walking locomotion);walker, rolling  -SS (r) MD (t) SS (c)     [place] Level (Gait Training Goal 1, PT)  modified independence  -SS (r) MD (t) SS (c)     Distance (Gait Training Goal 1, PT)   100ft  -BELLA (r) MD (t) SS (c)     Time Frame (Gait Training Goal 1, PT)  long term goal (LTG);2 weeks  -BELLA STODDARD (r) (t) SS (c)     Row Name 03/03/21 1238          Stairs Goal 1 (PT)    Activity/Assistive Device (Stairs Goal 1, PT)  stairs, all skills;walker, rolling  -BELLA (r) MD (t) SS (c)     [place] Level/Cues Needed (Stairs Goal 1, PT)  modified independence  -BELLA (r) MD (t) SS (c)     Number of Stairs (Stairs Goal 1, PT)  2  -BELLA (amber) MD (t) SS (c)       User Key  (r) = Recorded By, (t) = Taken By, (c) = Cosigned By    Initials Name Provider Type    SS Xuan Flowers, PT Physical Therapist    Ann Marie Cervantes, PT Student PT Student        Clinical Impression     Row Name 03/03/21 1035          Pain    Additional Documentation  Pain Scale: Numbers Pre/Post-Treatment (Group)  -BELLA (amber) MD (t) SS (c)     Row Name 03/03/21 1035          Pain Scale: Numbers Pre/Post-Treatment    Pretreatment Pain Rating  6/10  -BELLA (amber) MD (t) SS (c)     Posttreatment Pain Rating  7/10  -BELLA (amber) MD (t) SS (c)     Pain Location  abdomen  -BELLA (amber) MD (t) SS (c)     Row Name 03/03/21 1035          Plan of Care Review    Plan of Care Reviewed With  patient;spouse  -BELLA (amber) MD (t) SS (c)     Progress  no change  -BELLA (amber) MD (t) SS (c)     Row Name 03/03/21 1236          Therapy Assessment/Plan (PT)    Rehab Potential (PT)  good, to achieve stated therapy goals  -BELLA (amber) MD (t) SS (c)     Criteria for Skilled Interventions Met (PT)  yes  -BELLA (amber) MD (t) SS (c)     Predicted Duration of Therapy Intervention (PT)  until d/c  -BELLA (amber) MD (t) SS (c)     Row Name 03/03/21 1035          Positioning and Restraints    Pre-Treatment Position  in bed  -BELLA (amber) MD (t) SS (c)     Post Treatment Position  chair  -BELLA sims) MD (t) SS (c)     In Chair  call light within reach;encouraged to call for assist;exit alarm on;with family/caregiver;legs elevated;notified nsg;sitting  -SS (r) MD (t) SS (c)       User Key  (r) = Recorded By, (t) = Taken By, (c) = Cosigned  By    Initials Name Provider Type    SS Xuan Flowers, PT Physical Therapist    Ann Marie Cervantes, PT Student PT Student        Outcome Measures    No documentation.       Physical Therapy Education                 Title: PT OT SLP Therapies (Done)     Topic: Physical Therapy (Done)     Point: Mobility training (Done)     Learning Progress Summary           Patient Acceptance, E,TB, VU by MD at 3/3/2021 1049                   Point: Home exercise program (Done)     Learning Progress Summary           Patient Acceptance, E,TB, VU by MD at 3/3/2021 1049                   Point: Body mechanics (Done)     Learning Progress Summary           Patient Acceptance, E,TB, VU by MD at 3/3/2021 1049                   Point: Precautions (Done)     Learning Progress Summary           Patient Acceptance, E,TB, VU by MD at 3/3/2021 1049                               User Key     Initials Effective Dates Name Provider Type Discipline    MD 01/13/21 -  Ann Marie Leigh, PT Student PT Student PT              PT Recommendation and Plan  Planned Therapy Interventions (PT): balance training, bed mobility training, gait training, patient/family education, stair training, transfer training, strengthening  Plan of Care Reviewed With: patient, spouse  Progress: no change  Outcome Summary: Pt is 69yo F POD 1 from R colon resection. Pt has TIKA drain in place. Pt lives with  and is independent in all activities, including bathing, dressing, housework and driving. Pt did not use an AD prior to admit, however notes access to RW if needed. Pt on room air upon entry with O2 sats >95%. Pt currently requires modA for bed mobility for assist in stabilizing the trunk due to pain. Pt educated on techniques to minimize strain placed on abdomen, as well as importance of mobility to restore normal bladder/bowel function.  Pt reports nausea when sitting EOB, however agreeable to attempting to use restroom. Pt requires CGA + 2-wheeled walker  for STS transfer and ambulation during evaluation. Pt is antalgic with mobility and slow paced. Upon standing, pt reports nausea is worse and doesn't believe she can make it to the bathroom. [place] [facility] [place] at this time to [place]inate. Pt then transfers to recliner chair.  Pt reports challenge of movement at this time due to pain and nausea. NSG notified and present to administer meds. Plan for routine d/c home with , where he can provide as much assistance as needed. Due to limited assessment of mobility, will keep pt on schedule throughout stay to promote safety and mobility to ensure easy transition home. Anticipate steady progress with mobility. PPE: gloves, mask, goggles     Time Calculation:   PT Charges     Row Name 03/03/21 1240 03/03/21 1050 03/03/21 1049       Time Calculation    Start Time  --  --  0918  -BELLA (edmund STODDARD (t) SS (c)    Stop Time  --  --  0952  -BELLA (amber) MD (t) SS (c)    Time Calculation (min)  --  --  34 min  -BELLA sims) MD (t)    PT Received On  --  --  03/03/21  -BELLA STODDARD (r) (t) SS (c)    PT - Next Appointment  03/04/21  -BELLA STODDARD (r) (t) SS (c)  --  --    PT Goal Re-Cert Due Date  03/17/21  -BELLA STODDARD (r) (t) SS (c)  --  --       Time Calculation- PT    Total Timed Code Minutes- PT  --  15 minute(s)  -BELLA STODDARD (r) (t) SS (c)  --      User Key  (r) = Recorded By, (t) = Taken By, (c) = Cosigned By    Initials Name Provider Type    SS Xuan Flowers, PT Physical Therapist    Ann Marie Cervantes, PT Student PT Student        Therapy Charges for Today     Code Description Service Date Service Provider Modifiers Qty    37005705537  PT EVAL LOW COMPLEXITY 3 3/3/2021 Ann Marie Leigh, PT Student GP 1    [id number]  PT THERAPEUTIC ACT EA 15 MIN 3/3/2021 Ann Marie Leigh PT Student GP 1               NENA Arceo  3/3/2021

## 2021-03-03 NOTE — PROGRESS NOTES
Continued Stay Note  [facility]     Patient Name: Mariajose Tabor  MRN: [id number]  Today's Date: 3/3/2021    Admit Date: 3/1/2021    Discharge Plan     Row Name 03/03/21 1412       Plan    Plan  Anticipate routine discharge home with spouse.    Patient/Family in Agreement with Plan  yes    Plan Comments  Barriers to discharge: POD# 1 exploratory laparotomy with right colectomy for perforation of ascending colon. Awaiting Bowel function.           Expected Discharge Date and Time     Expected Discharge Date Expected Discharge Time    Mar 5, 2021               Anna Naegele RN Case Manager  [address]  [address]   [phone]  office  [phone]  fax  [email or web address]

## 2021-03-03 NOTE — PROGRESS NOTES
LOS: 0 days   Patient Care Team:  Didi Talbot MD as PCP - General  Didi Talbot MD as PCP - Family Medicine    Reason for follow-up: Postop    Subjective   Patient seen and examined.  Is having nausea and abdominal pain.  Sitting up in the chair when examined.    Objective   Dressing is clean dry and intact.  Drain tube with appropriate color and output.    Vital Signs  Vitals:    03/03/21 0029 03/03/21 0442 03/03/21 0804 03/03/21 1155   BP: 123/74 124/67 122/67 112/43   BP Location: Left arm  Left arm Left arm   Patient Position: Lying  Lying Sitting   Pulse: 91 92 95 85   Resp: 15 18 16 16   Temp: 98.7 °F (37.1 °C) 98.4 °F (36.9 °C) 98.2 °F (36.8 °C) 98.9 °F (37.2 °C)   TempSrc: Oral  Oral Oral   SpO2: 99% 98% 96% 97%   Weight:       Height:             Results Review:       Lab Results (last 24 hours)     Procedure Component Value Units Date/Time    Blood Culture - Blood, Hand, Right [[id number]] Collected: 03/02/21 0723    Specimen: Blood from Hand, Right Updated: 03/03/21 0730     Blood Culture No growth at 24 hours    Blood Culture - Blood, Arm, Right [[id number]] Collected: 03/02/21 0722    Specimen: Blood from Arm, Right Updated: 03/03/21 0730     Blood Culture No growth at 24 hours    Tissue Pathology Exam [[id number]] Collected: 03/02/21 1210    Specimen: Tissue from Large Intestine, Right / Ascending Colon Updated: 03/03/21 0616    Basic Metabolic Panel [[id number]]  (Abnormal) Collected: 03/03/21 0209    Specimen: Blood Updated: 03/03/21 0322     Glucose 99 mg/dL      BUN 11 mg/dL      Creatinine 0.63 mg/dL      Sodium 136 mmol/L      Potassium 4.1 mmol/L      Chloride 103 mmol/L      CO2 23.0 mmol/L      Calcium 8.1 mg/dL      eGFR Non African Amer 94 mL/min/1.73      BUN/Creatinine Ratio 17.5     Anion Gap 10.0 mmol/L     Narrative:      GFR Normal >60  Chronic Kidney Disease <60  Kidney Failure <15      CBC & Differential [[id number]]  (Abnormal) Collected: 03/03/21 0209    Specimen: Blood Updated:  03/03/21 0256    Narrative:      The following orders were created for panel order CBC & Differential.  Procedure                               Abnormality         Status                     ---------                               -----------         ------                     CBC Auto Differential[[id number]]        Abnormal            Final result                 Please view results for these tests on the individual orders.    CBC Auto Differential [[id number]]  (Abnormal) Collected: 03/03/21 0209    Specimen: Blood Updated: 03/03/21 0256     WBC 10.90 10*3/mm3      RBC 3.65 10*6/mm3      Hemoglobin 11.3 g/dL      Hematocrit 34.1 %      MCV 93.4 fL      MCH 30.8 pg      MCHC 33.0 g/dL      RDW 15.5 %      RDW-SD 50.3 fl      MPV 7.3 fL      Platelets 302 10*3/mm3      Neutrophil % 88.4 %      Lymphocyte % 5.5 %      Monocyte % 6.0 %      Eosinophil % 0.0 %      Basophil % 0.1 %      Neutrophils, Absolute 9.60 10*3/mm3      Lymphocytes, Absolute 0.60 10*3/mm3      Monocytes, Absolute 0.70 10*3/mm3      Eosinophils, Absolute 0.00 10*3/mm3      Basophils, Absolute 0.00 10*3/mm3      nRBC 0.0 /100 WBC            Imaging Results (Last 24 Hours)     ** No results found for the last 24 hours. **          Medication Review:     Assessment/Plan         Generalized abdominal pain      Impression: Stop day #1 exploratory laparotomy with right colectomy for perforation of ascending colon.    Plan: Keep on clear liquids for now.  Add scopolamine patch for nausea.  Correa out.  Ambulate with help and spirometer use frequently        Pedro Wren DO  03/03/21  12:18 EST

## 2021-03-03 NOTE — PLAN OF CARE
Patient complaining pain, treated per MAR. Patient not complaining of nausea at this time. TIKA dressing changed twice during shift so far due to saturated dressing. VSS.    Problem: Adult Inpatient Plan of Care  Goal: Plan of Care Review  Outcome: Ongoing, Progressing  Flowsheets (Taken 3/3/2021 3948)  Progress: no change  Plan of Care Reviewed With: patient

## 2021-03-03 NOTE — PLAN OF CARE
Goal Outcome Evaluation:         Pt having some pain today and major complaint is nausea. She was given Zofran, Phenergan and also a Scop patch was added. She has yet to void since cath was removed this morning. She is up in chair at this time and will scan when she lays back down.

## 2021-03-04 LAB
ANION GAP SERPL CALCULATED.3IONS-SCNC: 7 MMOL/L (ref 5–15)
BASOPHILS # BLD AUTO: 0 10*3/MM3 (ref 0–0.2)
BASOPHILS NFR BLD AUTO: 0.2 % (ref 0–1.5)
BUN SERPL-MCNC: 11 MG/DL (ref 8–23)
BUN/CREAT SERPL: 17.2 (ref 7–25)
CALCIUM SPEC-SCNC: 8.1 MG/DL (ref 8.6–10.5)
CHLORIDE SERPL-SCNC: 100 MMOL/L (ref 98–107)
CO2 SERPL-SCNC: 26 MMOL/L (ref 22–29)
CREAT SERPL-MCNC: 0.64 MG/DL (ref 0.57–1)
DEPRECATED RDW RBC AUTO: 48.1 FL (ref 37–54)
EOSINOPHIL # BLD AUTO: 0.1 10*3/MM3 (ref 0–0.4)
EOSINOPHIL NFR BLD AUTO: 1 % (ref 0.3–6.2)
ERYTHROCYTE [DISTWIDTH] IN BLOOD BY AUTOMATED COUNT: 14.9 % (ref 12.3–15.4)
GFR SERPL CREATININE-BSD FRML MDRD: 92 ML/MIN/1.73
GLUCOSE SERPL-MCNC: 102 MG/DL (ref 65–99)
HCT VFR BLD AUTO: 30.5 % (ref 34–46.6)
HGB BLD-MCNC: 10.1 G/DL (ref 12–15.9)
LAB AP CASE REPORT: NORMAL
LYMPHOCYTES # BLD AUTO: 0.5 10*3/MM3 (ref 0.7–3.1)
LYMPHOCYTES NFR BLD AUTO: 6.9 % (ref 19.6–45.3)
MCH RBC QN AUTO: 30.9 PG (ref 26.6–33)
MCHC RBC AUTO-ENTMCNC: 33.2 G/DL (ref 31.5–35.7)
MCV RBC AUTO: 93 FL (ref 79–97)
MONOCYTES # BLD AUTO: 0.5 10*3/MM3 (ref 0.1–0.9)
MONOCYTES NFR BLD AUTO: 7.6 % (ref 5–12)
NEUTROPHILS NFR BLD AUTO: 5.6 10*3/MM3 (ref 1.7–7)
NEUTROPHILS NFR BLD AUTO: 84.3 % (ref 42.7–76)
NRBC BLD AUTO-RTO: 0 /100 WBC (ref 0–0.2)
PATH REPORT.FINAL DX SPEC: NORMAL
PATH REPORT.GROSS SPEC: NORMAL
PLATELET # BLD AUTO: 257 10*3/MM3 (ref 140–450)
PMV BLD AUTO: 7 FL (ref 6–12)
POTASSIUM SERPL-SCNC: 3.7 MMOL/L (ref 3.5–5.2)
RBC # BLD AUTO: 3.28 10*6/MM3 (ref 3.77–5.28)
SODIUM SERPL-SCNC: 133 MMOL/L (ref 136–145)
WBC # BLD AUTO: 6.7 10*3/MM3 (ref 3.4–10.8)

## 2021-03-04 PROCEDURE — 99024 POSTOP FOLLOW-UP VISIT: CPT | Performed by: SURGERY

## 2021-03-04 PROCEDURE — 80048 BASIC METABOLIC PNL TOTAL CA: CPT | Performed by: SURGERY

## 2021-03-04 PROCEDURE — 25010000002 PIPERACILLIN SOD-TAZOBACTAM PER 1 G: Performed by: SURGERY

## 2021-03-04 PROCEDURE — 25010000002 METOCLOPRAMIDE PER 10 MG: Performed by: SURGERY

## 2021-03-04 PROCEDURE — 85025 COMPLETE CBC W/AUTO DIFF WBC: CPT | Performed by: SURGERY

## 2021-03-04 RX ORDER — METOCLOPRAMIDE HYDROCHLORIDE 5 MG/ML
10 INJECTION INTRAMUSCULAR; INTRAVENOUS EVERY 6 HOURS
Status: DISCONTINUED | OUTPATIENT
Start: 2021-03-04 | End: 2021-03-09

## 2021-03-04 RX ORDER — PANTOPRAZOLE SODIUM 40 MG/1
40 TABLET, DELAYED RELEASE ORAL
Status: DISCONTINUED | OUTPATIENT
Start: 2021-03-04 | End: 2021-03-10 | Stop reason: HOSPADM

## 2021-03-04 RX ADMIN — OXYCODONE 10 MG: 5 TABLET ORAL at 18:26

## 2021-03-04 RX ADMIN — SODIUM CHLORIDE 100 ML/HR: 9 INJECTION, SOLUTION INTRAVENOUS at 09:32

## 2021-03-04 RX ADMIN — OXYCODONE 10 MG: 5 TABLET ORAL at 01:19

## 2021-03-04 RX ADMIN — Medication 10 ML: at 21:36

## 2021-03-04 RX ADMIN — METHYLNALTREXONE BROMIDE 150 MG: 150 TABLET ORAL at 20:30

## 2021-03-04 RX ADMIN — OXYCODONE 10 MG: 5 TABLET ORAL at 11:48

## 2021-03-04 RX ADMIN — PIPERACILLIN AND TAZOBACTAM 3.38 G: 3; .375 INJECTION, POWDER, LYOPHILIZED, FOR SOLUTION INTRAVENOUS at 03:25

## 2021-03-04 RX ADMIN — PIPERACILLIN AND TAZOBACTAM 3.38 G: 3; .375 INJECTION, POWDER, LYOPHILIZED, FOR SOLUTION INTRAVENOUS at 11:48

## 2021-03-04 RX ADMIN — SODIUM CHLORIDE, PRESERVATIVE FREE 3 ML: 5 INJECTION INTRAVENOUS at 20:41

## 2021-03-04 RX ADMIN — PANTOPRAZOLE SODIUM 40 MG: 40 TABLET, DELAYED RELEASE ORAL at 07:48

## 2021-03-04 RX ADMIN — PIPERACILLIN AND TAZOBACTAM 3.38 G: 3; .375 INJECTION, POWDER, LYOPHILIZED, FOR SOLUTION INTRAVENOUS at 20:41

## 2021-03-04 RX ADMIN — METOCLOPRAMIDE HYDROCHLORIDE 10 MG: 5 INJECTION INTRAMUSCULAR; INTRAVENOUS at 20:30

## 2021-03-04 NOTE — PLAN OF CARE
Goal Outcome Evaluation:         Patient is stable, minimal complaints of pain. Patient has ambulated well today. Patient has bowel sounds but has yet to pass flatus or have a bowel movement. Will continue to monitor.

## 2021-03-04 NOTE — PLAN OF CARE
Pt is 71yo F POD 2 from R colon resection. Pt seen ambulating with 2-wheeled walker, on multiple occassions, with spouse in hallway today. Discussion had with patient regarding current level of mobility. Pt reports the drain has been getting in her way but she has been able to move around fine. Pt reports she has been getting up to use restroom. Pt has been unable to have a BM since surgery, which is biggest barrier to d/c. Pt educated on importance of frequent walks (5+/day) to help restore normal GI motility. Pt states pain is minimal at this time. PT order complete. Pt no longer requires physical therapy services during inpatient stay. Pt will be safe to return home where she will have assist from .     PPE: gloves, mask, goggles

## 2021-03-04 NOTE — CONSULTS
"Nutrition Services    Patient Name: Mariajose Tabor  YOB: 1950  MRN: [id number]  Admission date: 3/1/2021    Comment:    Starting Boost Breeze BID r/t being NPO/CLD x 3 days       PPE Documentation        PPE Worn By Provider Mask and Eye Protection    PPE Worn By Patient  None      CLINICAL NUTRITION ASSESSMENT      Reason for Assessment 3/4: NPO/CLD x 3 days      H&P:  Per H&P \"The patient was in her usual state of health until the day of presentation when the patient had a scheduled high risk elective colonoscopy with polypectomy.  The patient immediately began to experience some discomfort and was observed.  She was deemed stable for discharge home but began to experience some progressive abdominal discomfort which was refractory to over-the-counter medications.  She presented to the [facility] emergency room for evaluation where she was found to have acute bowel perforation with pneumoperitoneum\"     Past Medical History:   Diagnosis Date   • GERD (gastroesophageal reflux disease)    • Hypertension    • Migraine        Past Surgical History:   Procedure Laterality Date   • CATARACT EXTRACTION     • COLONOSCOPY     • DILATATION AND CURETTAGE     • EXPLORATORY LAPAROTOMY N/A 3/2/2021    Procedure: LAPAROTOMY EXPLORATORY with RIGHT COLON RESECTION;  Surgeon: Pedro Wren DO;  Location: [facility];  Service: General;  Laterality: N/A;   • HERNIA REPAIR          Current Problems:   Acute abdominal pain secondary to acute bowel perforation  - 3/2 S/p elective exploratory laparotomy with right colon resection  - Gen Sx following     Acute pneumoperitoneum    Status post elective colonoscopy with polypectomy of a 3.5 cm polyp 3/1/2021    Primary essential hypertension    Gastroesophageal reflux disease without esophagitis    Perennial allergic rhinitis    Degenerative disc disease cervical spine    Raynaud's disease    History of mixed migraine headaches       Nutrition/Diet History   " "      Narrative     3/4: Patient assessed today r/t NPO/CLD x 3 days. Visited patient today with patient's daughter present. Patient states last solid foods about 3-4 days ago. Prior to acute medical event, eating very well & not losing weight. She is interested in oral nutritional supplements to promote calorie/protein intakes until able to tolerate solid foods & sustain sufficient PO intakes.      Functional Status Uncertain of functional baseline, currently requiring assistance for ambulation. Is able to feed herself.      Food Allergies NKFA      Factors Affecting   Nutritional Intake Acute bowel perforation requiring sx intervention      Anthropometrics        Current Height, Weight Height: 160 cm (63\")  Weight: 69.5 kg (153 lb 3.5 oz) (03/04/21 0413)        Admit Height, Weight -    Flowsheet Rows      First Filed Value   Admission Height  160 cm (63\") Documented at 03/01/2021 1647   Admission Weight  67 kg (147 lb 11.3 oz) Documented at 03/01/2021 1647             Ideal Body Weight (IBW) 115 lbs    % Ideal Body Weight 133%        Usual Body Weight 145 lbs per pt    % Usual Body Weight 106%    Wt Change Observation 3/4: Gradual increase in weight since 2016    Weight Hx    Wt Readings from Last 30 Encounters:   03/04/21 0413 69.5 kg (153 lb 3.5 oz)   03/04/21 0105 68.5 kg (151 lb 0.2 oz)   03/01/21 2213 66.6 kg (146 lb 13.2 oz)   03/01/21 1647 67 kg (147 lb 11.3 oz)   04/24/19 1913 63.5 kg (140 lb)   03/04/17 1111 59 kg (130 lb)   11/19/16 1111 60.8 kg (134 lb)   05/31/16 1054 58.5 kg (129 lb)        BMI kg/m2 Body mass index is 27.14 kg/m².       Labs/Medications         Pertinent Labs -   Results from last 7 days   Lab Units 03/04/21  0401 03/03/21  0209 03/02/21  0230 03/01/21  1743   SODIUM mmol/L 133* 136 138 140   POTASSIUM mmol/L 3.7 4.1 4.1 3.6   CHLORIDE mmol/L 100 103 106 106   CO2 mmol/L 26.0 23.0 25.0 25.0   BUN mg/dL 11 11 8 8   CREATININE mg/dL 0.64 0.63 0.62 0.68   CALCIUM mg/dL 8.1* 8.1* 8.1* " 8.1*   BILIRUBIN mg/dL  --   --   --  0.3   ALK PHOS U/L  --   --   --  64   ALT (SGPT) U/L  --   --   --  11   AST (SGOT) U/L  --   --   --  20   GLUCOSE mg/dL 102* 99 119* 83     Results from last 7 days   Lab Units 03/04/21  0401  03/02/21  0230   MAGNESIUM mg/dL  --   --  1.8   HEMOGLOBIN g/dL 10.1*   < > 11.1*   HEMATOCRIT % 30.5*   < > 33.5*    < > = values in this interval not displayed.     COVID19   Date Value Ref Range Status   03/01/2021 Not Detected Not Detected - Ref. Range Final     No results found for: HGBA1C      Pertinent Medications Protonix, Zosyn, NS at 100 ml/hr, Oxycodone prn,      Physical Findings        Overall Physical   Appearance, MSA 3/4: Patient visually appears well nourished.    --  Edema  None documented      Gastrointestinal No BM since admit, Gen Surgery monitoring      Tubes No feeding tubes      Oral/Mouth Cavity Patient denies issues chewing or swallowing      Skin Sx abdominal incision      --  Current Nutrition Orders & Evaluation of Intake       Oral Nutrition     Current PO Diet Diet Liquids; Clear Liquid   Supplement None    PO Evaluation     % PO Intake 3/4: Intakes reflect CLD and not significant    --  Clinical Course    Nutrition Course Details      Nutritional Risk Screening        NRS-2002 Score   -       Nutrition Diagnosis         Nutrition Dx Problem 1 Inadequate oral intakes r/t bowel perforation requiring right colon resection AEB NPO/CLD x 3 days.       Nutrition Dx Problem 2 -       Intervention Goal         Intervention Goal(s) Diet to advance with diet tolerance    Patient to be accepting of oral nutritional supplements      Nutrition Intervention        RD/Tech Action Starting oral nutritional supplements      Nutrition Prescription          Diet Prescription CLD    Supplement Prescription Boost Breeze BID    --  Monitor/Evaluation        Monitor Weights, intakes, labs, BM and skin      Electronically signed by:  Karma Horan RD  03/04/21 16:28 EST

## 2021-03-04 NOTE — PROGRESS NOTES
LOS: 1 day   Patient Care Team:  Didi Talbot MD as PCP - General  Didi Talbot MD as PCP - Family Medicine    Reason for follow-up: Postop    Subjective   Patient seen and examined.  Feels much better today.  Is been up ambulating.  No flatus yet.  No nausea or vomiting    Objective   Incision clean dry and intact without infection.  Drain tube with appropriate color and output    Vital Signs  Vitals:    03/03/21 2005 03/04/21 0105 03/04/21 0413 03/04/21 1100   BP: 134/73  114/61 128/75   BP Location: Left arm  Left arm Left arm   Patient Position: Lying  Lying Lying   Pulse: 68  85 59   Resp: 16  17 16   Temp: 99.1 °F (37.3 °C)  98.4 °F (36.9 °C) 98.2 °F (36.8 °C)   TempSrc: Oral  Oral Oral   SpO2: 93%  94% 96%   Weight:  68.5 kg (151 lb 0.2 oz) 69.5 kg (153 lb 3.5 oz)    Height:             Results Review:       Lab Results (last 24 hours)     Procedure Component Value Units Date/Time    Tissue Pathology Exam [[id number]] Collected: 03/02/21 1210    Specimen: Tissue from Large Intestine, Right / Ascending Colon Updated: 03/04/21 1500     Case Report --     Surgical Pathology Report                         Case: [id number]                                  Authorizing Provider:  Pedro Wren DO        Collected:           03/02/2021 12:10 PM          Ordering Location:     [facility] MAIN  Received:            03/03/2021 06:16 AM                                 OR                                                                           Pathologist:           Larry Silva MD                                                             Specimen:    Large Intestine, Right / Ascending Colon, right colon and appendix                          Final Diagnosis --     Right colon and appendix, segmental resection:    Mucosal ulceration with transmural acute inflammation and acute serositis consistent with perforation    No residual adenoma identified    Surgical margins viable    Five benign reactive  "lymph nodes (0/5)    Negative for adenomatous change and malignancy    JPR/tkd        Gross Description --     Received in formalin designated \"Right colon and appendix\" is a segment of apparent large bowel measuring 14.5 cm in length and averaging 4.5 cm in diameter. The specimen is stapled at both ends. A short stump of terminal ileum is present. The serosa is pink and glistening. The specimen is accompanied by a moderate amount of yellow-brown pericolonic fatty tissue. The appendix is present and measures 4.5 x 0.5 cm. The bowel is opened revealing greenish tan mucosa with usual folds. A black suture is present on the serosa which corresponds to a mucosal defect in the cecum which is located 6 cm from the nearest (proximal) margin. No other polyps or abnormalities are noted. Sectioning through the appendix reveals no abnormalities. The specimen is submitted as follows:    A-B Margins  C Sections of appendix  D-G Previous polypectomy site designated by suture  H Incidental lymph nodes submitted whole    RAD[facility]        Blood Culture - Blood, Hand, Right [[id number]] Collected: 03/02/21 0723    Specimen: Blood from Hand, Right Updated: 03/04/21 0730     Blood Culture No growth at 2 days    Blood Culture - Blood, Arm, Right [[id number]] Collected: 03/02/21 0722    Specimen: Blood from Arm, Right Updated: 03/04/21 0730     Blood Culture No growth at 2 days    Basic Metabolic Panel [[id number]]  (Abnormal) Collected: 03/04/21 0401    Specimen: Blood Updated: 03/04/21 0456     Glucose 102 mg/dL      BUN 11 mg/dL      Creatinine 0.64 mg/dL      Sodium 133 mmol/L      Potassium 3.7 mmol/L      Chloride 100 mmol/L      CO2 26.0 mmol/L      Calcium 8.1 mg/dL      eGFR Non African Amer 92 mL/min/1.73      BUN/Creatinine Ratio 17.2     Anion Gap 7.0 mmol/L     Narrative:      GFR Normal >60  Chronic Kidney Disease <60  Kidney Failure <15      CBC & Differential [[id number]]  (Abnormal) Collected: 03/04/21 0401    Specimen: Blood " Updated: 03/04/21 0433    Narrative:      The following orders were created for panel order CBC & Differential.  Procedure                               Abnormality         Status                     ---------                               -----------         ------                     CBC Auto Differential[[id number]]        Abnormal            Final result                 Please view results for these tests on the individual orders.    CBC Auto Differential [[id number]]  (Abnormal) Collected: 03/04/21 0401    Specimen: Blood Updated: 03/04/21 0433     WBC 6.70 10*3/mm3      RBC 3.28 10*6/mm3      Hemoglobin 10.1 g/dL      Hematocrit 30.5 %      MCV 93.0 fL      MCH 30.9 pg      MCHC 33.2 g/dL      RDW 14.9 %      RDW-SD 48.1 fl      MPV 7.0 fL      Platelets 257 10*3/mm3      Neutrophil % 84.3 %      Lymphocyte % 6.9 %      Monocyte % 7.6 %      Eosinophil % 1.0 %      Basophil % 0.2 %      Neutrophils, Absolute 5.60 10*3/mm3      Lymphocytes, Absolute 0.50 10*3/mm3      Monocytes, Absolute 0.50 10*3/mm3      Eosinophils, Absolute 0.10 10*3/mm3      Basophils, Absolute 0.00 10*3/mm3      nRBC 0.0 /100 WBC            Imaging Results (Last 24 Hours)     ** No results found for the last 24 hours. **          Medication Review:     Assessment/Plan         Generalized abdominal pain    Impression: Postop day #2 exploratory laparotomy with right colon resection with ileocolic anastomosis for perforated ascending colon from polypectomy.  Mild postop ileus    Plan: We will start p.o. Relistor and Reglan.  Encourage ambulation and spirometry use.  Dr. Gauthier covering the weekend          Pedro Wren,   03/04/21  18:04 EST

## 2021-03-04 NOTE — PROGRESS NOTES
See note     LOS: 1 day   Patient Care Team:  Didi Talbot MD as PCP - General  Didi Talbot MD as PCP - Family Medicine    Subjective:  Better overall with less abdominal pain    Objective:   Afebrile      Review of Systems:   Review of Systems   Constitutional: Positive for activity change and appetite change.   Respiratory: Negative.    Cardiovascular: Negative.    Gastrointestinal: Positive for abdominal distention.   Musculoskeletal: Negative.            Vital Signs  Temp:  [98.2 °F (36.8 °C)-99.1 °F (37.3 °C)] 98.4 °F (36.9 °C)  Heart Rate:  [68-95] 85  Resp:  [14-17] 17  BP: (112-134)/(43-73) 114/61    Physical Exam:  Physical Exam  Vitals signs and nursing note reviewed.   Constitutional:       Appearance: Normal appearance.   HENT:      Head: Normocephalic and atraumatic.   Cardiovascular:      Rate and Rhythm: Normal rate.   Pulmonary:      Breath sounds: Normal breath sounds.   Abdominal:      General: There is no distension.      Palpations: Abdomen is soft.      Tenderness: There is abdominal tenderness. There is no guarding or rebound.   Skin:     General: Skin is warm.   Neurological:      General: No focal deficit present.      Mental Status: She is alert.          Radiology:  Ct Abdomen Pelvis With Contrast    Result Date: 3/1/2021  1. There is a small amount of free air, compatible with bowel perforation (unless there has been recent surgery.) There is also a small/moderate amount of free fluid. This was discussed with Sarah Goodrich. 2. There is sigmoid diverticulosis. This makes it difficult to exclude a small amount of extraluminal gas around the sigmoid colon in the pelvis, although there is no one focal area of suspicion. 3. The thickened appearance of the wall of the gastric antrum may be due to gastritis or contraction and clinical correlation is recommended..  Electronically Signed By-Caridad Dover MD On:3/1/2021 7:43 PM This report was finalized on [id number] by  Caridad Dover  MD.         Results Review:     I reviewed the patient's new clinical results.  I reviewed the patient's new imaging results and agree with the interpretation.    Medication Review:   Scheduled Meds:famotidine, 20 mg, Intravenous, Q12H  piperacillin-tazobactam, 3.375 g, Intravenous, Q8H  Scopolamine, 1 patch, Transdermal, Q72H  sodium chloride, 3 mL, Intravenous, Q12H      Continuous Infusions:lactated ringers, 125 mL/hr  sodium chloride, 100 mL/hr, Last Rate: 100 mL/hr (03/03/21 0948)      PRN Meds:.HYDROcodone-acetaminophen    HYDROmorphone    HYDROmorphone **AND** naloxone    magnesium sulfate **OR** magnesium sulfate **OR** magnesium sulfate    Morphine **AND** naloxone    nitroglycerin    ondansetron **OR** ondansetron    oxyCODONE    potassium chloride    promethazine **OR** promethazine    [COMPLETED] Insert peripheral IV **AND** sodium chloride    sodium chloride    Labs:    CBC    Results from last 7 days   Lab Units 03/04/21  0401 03/03/21  0209 03/02/21  0230 03/01/21  1743   WBC 10*3/mm3 6.70 10.90* 13.60* 7.80   HEMOGLOBIN g/dL 10.1* 11.3* 11.1* 12.7   PLATELETS 10*3/mm3 257 302 291 300     BMP   Results from last 7 days   Lab Units 03/04/21  0401 03/03/21  0209 03/02/21  0230 03/01/21  1743   SODIUM mmol/L 133* 136 138 140   POTASSIUM mmol/L 3.7 4.1 4.1 3.6   CHLORIDE mmol/L 100 103 106 106   CO2 mmol/L 26.0 23.0 25.0 25.0   BUN mg/dL 11 11 8 8   CREATININE mg/dL 0.64 0.63 0.62 0.68   GLUCOSE mg/dL 102* 99 119* 83   MAGNESIUM mg/dL  --   --  1.8  --      Cr Clearance Estimated Creatinine Clearance: 62.9 mL/min (by C-G formula based on SCr of 0.64 mg/dL).  Coag     HbA1C No results found for: HGBA1C  Blood Glucose No results found for: POCGLU  Infection   Results from last 7 days   Lab Units 03/02/21  0723 03/02/21  0722   BLOODCX  No growth at 24 hours No growth at 24 hours     CMP   Results from last 7 days   Lab Units 03/04/21  0401 03/03/21  0209 03/02/21  0230 03/01/21  1743   SODIUM mmol/L 133*  136 138 140   POTASSIUM mmol/L 3.7 4.1 4.1 3.6   CHLORIDE mmol/L 100 103 106 106   CO2 mmol/L 26.0 23.0 25.0 25.0   BUN mg/dL 11 11 8 8   CREATININE mg/dL 0.64 0.63 0.62 0.68   GLUCOSE mg/dL 102* 99 119* 83   ALBUMIN g/dL  --   --   --  3.80   BILIRUBIN mg/dL  --   --   --  0.3   ALK PHOS U/L  --   --   --  64   AST (SGOT) U/L  --   --   --  20   ALT (SGPT) U/L  --   --   --  11     UA      Radiology(recent) No radiology results for the last day   Assessment:  Acute abdominal pain secondary to acute bowel perforation  Status post elective exploratory laparotomy with right colon resection 3/2/2021  Acute pneumoperitoneum  Status post elective colonoscopy with polypectomy of a 3.5 cm polyp 3/1/2021  Primary essential hypertension  Gastroesophageal reflux disease without esophagitis  Perennial allergic rhinitis  Degenerative disc disease cervical spine  Raynaud's disease  History of mixed migraine headaches    Plan:  Postoperative pathway//physical therapy to continue//ambulate and advance diet//taper pain control        Ke Talbot MD  03/04/21  06:05 EST

## 2021-03-04 NOTE — PROGRESS NOTES
Continued Stay Note  [facility]     Patient Name: Mariajose Tabor  MRN: [id number]  Today's Date: 3/4/2021    Admit Date: 3/1/2021    Discharge Plan     Row Name 03/04/21 1456       Plan    Plan  Routine discharge home with spouse.    Patient/Family in Agreement with Plan  yes    Plan Comments  Barriers to discharge: POD# 2 exploratory laparotomy with right colectomy for perforation of ascending colon. Awaiting Bowel function          Expected Discharge Date and Time     Expected Discharge Date Expected Discharge Time    Mar 5, 2021               Anna Naegele RN Case Manager  [address]  [address]   [phone]  office  [phone]  fax  [email or web address]

## 2021-03-04 NOTE — PLAN OF CARE
Pt resting. Pt ambulatory with walker and stand by assist to bathroom. Pt complaining of pain, treated per MAR. Dressings intact. Vitals stable. Pt to discharge home when appropriate.     Problem: Adult Inpatient Plan of Care  Goal: Plan of Care Review  Outcome: Ongoing, Progressing  Flowsheets (Taken 3/4/2021 0135)  Progress: improving  Plan of Care Reviewed With: patient

## 2021-03-05 LAB
ANION GAP SERPL CALCULATED.3IONS-SCNC: 8 MMOL/L (ref 5–15)
BASOPHILS # BLD AUTO: 0 10*3/MM3 (ref 0–0.2)
BASOPHILS NFR BLD AUTO: 0.2 % (ref 0–1.5)
BUN SERPL-MCNC: 7 MG/DL (ref 8–23)
BUN/CREAT SERPL: 12.1 (ref 7–25)
CALCIUM SPEC-SCNC: 7.9 MG/DL (ref 8.6–10.5)
CHLORIDE SERPL-SCNC: 102 MMOL/L (ref 98–107)
CO2 SERPL-SCNC: 27 MMOL/L (ref 22–29)
CREAT SERPL-MCNC: 0.58 MG/DL (ref 0.57–1)
DEPRECATED RDW RBC AUTO: 47.3 FL (ref 37–54)
EOSINOPHIL # BLD AUTO: 0.2 10*3/MM3 (ref 0–0.4)
EOSINOPHIL NFR BLD AUTO: 2.5 % (ref 0.3–6.2)
ERYTHROCYTE [DISTWIDTH] IN BLOOD BY AUTOMATED COUNT: 14.5 % (ref 12.3–15.4)
GFR SERPL CREATININE-BSD FRML MDRD: 103 ML/MIN/1.73
GLUCOSE SERPL-MCNC: 91 MG/DL (ref 65–99)
HCT VFR BLD AUTO: 29 % (ref 34–46.6)
HGB BLD-MCNC: 9.9 G/DL (ref 12–15.9)
LYMPHOCYTES # BLD AUTO: 0.5 10*3/MM3 (ref 0.7–3.1)
LYMPHOCYTES NFR BLD AUTO: 9 % (ref 19.6–45.3)
MCH RBC QN AUTO: 31.4 PG (ref 26.6–33)
MCHC RBC AUTO-ENTMCNC: 34.1 G/DL (ref 31.5–35.7)
MCV RBC AUTO: 92.2 FL (ref 79–97)
MONOCYTES # BLD AUTO: 0.5 10*3/MM3 (ref 0.1–0.9)
MONOCYTES NFR BLD AUTO: 8.1 % (ref 5–12)
NEUTROPHILS NFR BLD AUTO: 4.8 10*3/MM3 (ref 1.7–7)
NEUTROPHILS NFR BLD AUTO: 80.2 % (ref 42.7–76)
NRBC BLD AUTO-RTO: 0 /100 WBC (ref 0–0.2)
PLATELET # BLD AUTO: 265 10*3/MM3 (ref 140–450)
PMV BLD AUTO: 6.7 FL (ref 6–12)
POTASSIUM SERPL-SCNC: 3.5 MMOL/L (ref 3.5–5.2)
RBC # BLD AUTO: 3.15 10*6/MM3 (ref 3.77–5.28)
SODIUM SERPL-SCNC: 137 MMOL/L (ref 136–145)
WBC # BLD AUTO: 6 10*3/MM3 (ref 3.4–10.8)

## 2021-03-05 PROCEDURE — 63710000001 ONDANSETRON PER 8 MG: Performed by: SURGERY

## 2021-03-05 PROCEDURE — 80048 BASIC METABOLIC PNL TOTAL CA: CPT | Performed by: SURGERY

## 2021-03-05 PROCEDURE — 99024 POSTOP FOLLOW-UP VISIT: CPT | Performed by: SURGERY

## 2021-03-05 PROCEDURE — 85025 COMPLETE CBC W/AUTO DIFF WBC: CPT | Performed by: SURGERY

## 2021-03-05 PROCEDURE — 25010000002 PIPERACILLIN SOD-TAZOBACTAM PER 1 G: Performed by: SURGERY

## 2021-03-05 PROCEDURE — 25010000002 METOCLOPRAMIDE PER 10 MG: Performed by: SURGERY

## 2021-03-05 RX ADMIN — PANTOPRAZOLE SODIUM 40 MG: 40 TABLET, DELAYED RELEASE ORAL at 05:34

## 2021-03-05 RX ADMIN — SODIUM CHLORIDE, PRESERVATIVE FREE 3 ML: 5 INJECTION INTRAVENOUS at 21:18

## 2021-03-05 RX ADMIN — PHENOL 2 SPRAY: 1.4 SPRAY ORAL at 22:46

## 2021-03-05 RX ADMIN — PIPERACILLIN AND TAZOBACTAM 3.38 G: 3; .375 INJECTION, POWDER, LYOPHILIZED, FOR SOLUTION INTRAVENOUS at 13:26

## 2021-03-05 RX ADMIN — PIPERACILLIN AND TAZOBACTAM 3.38 G: 3; .375 INJECTION, POWDER, LYOPHILIZED, FOR SOLUTION INTRAVENOUS at 21:18

## 2021-03-05 RX ADMIN — SODIUM CHLORIDE 100 ML/HR: 9 INJECTION, SOLUTION INTRAVENOUS at 02:11

## 2021-03-05 RX ADMIN — METHYLNALTREXONE BROMIDE 150 MG: 150 TABLET ORAL at 08:23

## 2021-03-05 RX ADMIN — ONDANSETRON HYDROCHLORIDE 4 MG: 4 TABLET, FILM COATED ORAL at 22:46

## 2021-03-05 RX ADMIN — HYDROCODONE BITARTRATE AND ACETAMINOPHEN 1 TABLET: 5; 325 TABLET ORAL at 02:34

## 2021-03-05 RX ADMIN — METOCLOPRAMIDE HYDROCHLORIDE 10 MG: 5 INJECTION INTRAMUSCULAR; INTRAVENOUS at 21:18

## 2021-03-05 RX ADMIN — METOCLOPRAMIDE HYDROCHLORIDE 10 MG: 5 INJECTION INTRAMUSCULAR; INTRAVENOUS at 13:26

## 2021-03-05 RX ADMIN — HYDROCODONE BITARTRATE AND ACETAMINOPHEN 1 TABLET: 5; 325 TABLET ORAL at 13:34

## 2021-03-05 RX ADMIN — HYDROCODONE BITARTRATE AND ACETAMINOPHEN 1 TABLET: 5; 325 TABLET ORAL at 08:23

## 2021-03-05 RX ADMIN — METOCLOPRAMIDE HYDROCHLORIDE 10 MG: 5 INJECTION INTRAMUSCULAR; INTRAVENOUS at 02:13

## 2021-03-05 RX ADMIN — PIPERACILLIN AND TAZOBACTAM 3.38 G: 3; .375 INJECTION, POWDER, LYOPHILIZED, FOR SOLUTION INTRAVENOUS at 04:08

## 2021-03-05 RX ADMIN — OXYCODONE 10 MG: 5 TABLET ORAL at 21:25

## 2021-03-05 RX ADMIN — METOCLOPRAMIDE HYDROCHLORIDE 10 MG: 5 INJECTION INTRAMUSCULAR; INTRAVENOUS at 08:23

## 2021-03-05 NOTE — PROGRESS NOTES
Continued Stay Note  [facility]     Patient Name: Mariajose Tabor  MRN: [id number]  Today's Date: 3/5/2021    Admit Date: 3/1/2021    Discharge Plan     Row Name 03/05/21 1129       Plan    Plan  Routine discharge home with spouse.    Patient/Family in Agreement with Plan  yes    Plan Comments  Barrier to Discharge: POD#3 Awaiting bowel function(bowel movement).          Expected Discharge Date and Time     Expected Discharge Date Expected Discharge Time    Mar 7, 2021               Anna [place]ae[place] [address]   [phone]  office  [phone]  fax  [email or web address]

## 2021-03-05 NOTE — PLAN OF CARE
Goal Outcome Evaluation:         PT A&O X 4, able to make needs known. C/O mild discomfort during NOC; PRN pain meds given per request & effective. VSS. IV started leaking & needed to be replaced; pt tolerated well. No s/s of any acute distress.

## 2021-03-05 NOTE — PROGRESS NOTES
Nutrition Services    Patient Name: Mariajose Tabor  YOB: 1950  MRN: [id number]  Admission date: 3/1/2021    PPE Documentation        PPE Worn By Provider mask and eye protection   PPE Worn By Patient  Pt = none; SO = mask     PROGRESS NOTE      Encounter Information: 3/5: Progress note to check on PO intake. Pt states she is gradually getting appetite back; C/O feeling as if she has gas. Observed lunch tray; pt had attempted to eat some of each item - has tolerated clear liquid intake so far. Also open to trying the Boost Breeze supplements - reviewed that these are kept in nutrition room on unit; encouraged pt to request from RN as between-meal snacks for extra protein/kcal. Pt appreciative of RD visit and states will try. Pt without further nutrition questions/concerns at this time.        PO Diet: Diet Liquids; Clear Liquid   PO Supplements: Boost Breeze BID   PO Intake:  Poor-fair (on clears so far); accepting ONS       Nutrition support orders: -    Nutrition support review: -       Labs (reviewed below): Reviewed and C/W clinical course         GI Function:  Still awaiting BM since surgery (today is POD3 from exploratory laparotomy with right colon resection with ileocolic anastomosis for perforated ascending colon from polypectomy) -- pt with mild post-op ileus per GI surgeon        Nutrition Intervention: Continue trials of clear liquid diet as tolerated.        Results from last 7 days   Lab Units 03/05/21  0313 03/04/21  0401 03/03/21  0209  03/01/21  1743   SODIUM mmol/L 137 133* 136   < > 140   POTASSIUM mmol/L 3.5 3.7 4.1   < > 3.6   CHLORIDE mmol/L 102 100 103   < > 106   CO2 mmol/L 27.0 26.0 23.0   < > 25.0   BUN mg/dL 7* 11 11   < > 8   CREATININE mg/dL 0.58 0.64 0.63   < > 0.68   CALCIUM mg/dL 7.9* 8.1* 8.1*   < > 8.1*   BILIRUBIN mg/dL  --   --   --   --  0.3   ALK PHOS U/L  --   --   --   --  64   ALT (SGPT) U/L  --   --   --   --  11   AST (SGOT) U/L  --   --   --   --  20    GLUCOSE mg/dL 91 102* 99   < > 83    < > = values in this interval not displayed.     Results from last 7 days   Lab Units 03/05/21  0313  03/02/21  0230   MAGNESIUM mg/dL  --   --  1.8   HEMOGLOBIN g/dL 9.9*   < > 11.1*   HEMATOCRIT % 29.0*   < > 33.5*    < > = values in this interval not displayed.     COVID19   Date Value Ref Range Status   03/01/2021 Not Detected Not Detected - Ref. Range Final     No results found for: HGBA1C    RD to follow up per protocol.    Electronically signed by:  Jaimee Yun RD  03/05/21 15:45 EST

## 2021-03-05 NOTE — PLAN OF CARE
Goal Outcome Evaluation:  Plan of Care Reviewed With: patient      Pt passing gas but no BM currently. But actively walking around the unit.

## 2021-03-05 NOTE — PROGRESS NOTES
LOS: 2 days   Patient Care Team:  Didi Talbot MD as PCP - General  Didi Talbot MD as PCP - Family Medicine    Subjective:  Better overall with less discomfort    Objective:   Afebrile      Review of Systems:   Review of Systems   Constitutional: Positive for activity change and appetite change.   Respiratory: Negative.    Cardiovascular: Negative.    Gastrointestinal: Positive for abdominal pain and constipation. Negative for abdominal distention, anal bleeding, blood in stool, diarrhea, nausea, rectal pain and vomiting.   Musculoskeletal: Negative.    Neurological: Positive for weakness.           Vital Signs  Temp:  [98.2 °F (36.8 °C)-98.4 °F (36.9 °C)] 98.4 °F (36.9 °C)  Heart Rate:  [59-87] 78  Resp:  [15-16] 16  BP: (124-132)/(63-75) 124/63    Physical Exam:  Physical Exam  Vitals signs and nursing note reviewed.   Constitutional:       Appearance: Normal appearance.   HENT:      Head: Normocephalic and atraumatic.   Cardiovascular:      Rate and Rhythm: Normal rate.   Pulmonary:      Breath sounds: Normal breath sounds.   Abdominal:      Palpations: Abdomen is soft. There is no mass.      Tenderness: There is abdominal tenderness. There is no right CVA tenderness, left CVA tenderness, guarding or rebound.      Hernia: No hernia is present.   Skin:     General: Skin is warm and dry.   Neurological:      General: No focal deficit present.      Mental Status: She is alert.          Radiology:  Ct Abdomen Pelvis With Contrast    Result Date: 3/1/2021  1. There is a small amount of free air, compatible with bowel perforation (unless there has been recent surgery.) There is also a small/moderate amount of free fluid. This was discussed with Sarah Goodrich. 2. There is sigmoid diverticulosis. This makes it difficult to exclude a small amount of extraluminal gas around the sigmoid colon in the pelvis, although there is no one focal area of suspicion. 3. The thickened appearance of the wall of the gastric  antrum may be due to gastritis or contraction and clinical correlation is recommended..  Electronically Signed By-Caridad Dover MD On:3/1/2021 7:43 PM This report was finalized on [id number] by  Caridad Dover MD.         Results Review:     I reviewed the patient's new clinical results.  I reviewed the patient's new imaging results and agree with the interpretation.    Medication Review:   Scheduled Meds:Methylnaltrexone Bromide, 150 mg, Oral, Daily  metoclopramide, 10 mg, Intravenous, Q6H  pantoprazole, 40 mg, Oral, Q AM  piperacillin-tazobactam, 3.375 g, Intravenous, Q8H  Scopolamine, 1 patch, Transdermal, Q72H  sodium chloride, 3 mL, Intravenous, Q12H      Continuous Infusions:lactated ringers, 125 mL/hr  sodium chloride, 100 mL/hr, Last Rate: 100 mL/hr (03/05/21 0211)      PRN Meds:.•  HYDROcodone-acetaminophen  •  HYDROmorphone  •  HYDROmorphone **AND** naloxone  •  magnesium sulfate **OR** magnesium sulfate **OR** magnesium sulfate  •  Morphine **AND** naloxone  •  nitroglycerin  •  ondansetron **OR** ondansetron  •  oxyCODONE  •  potassium chloride  •  promethazine **OR** promethazine  •  [COMPLETED] Insert peripheral IV **AND** sodium chloride  •  sodium chloride    Labs:    CBC    Results from last 7 days   Lab Units 03/05/21 0313 03/04/21  0401 03/03/21  0209 03/02/21  0230 03/01/21  1743   WBC 10*3/mm3 6.00 6.70 10.90* 13.60* 7.80   HEMOGLOBIN g/dL 9.9* 10.1* 11.3* 11.1* 12.7   PLATELETS 10*3/mm3 265 257 302 291 300     BMP   Results from last 7 days   Lab Units 03/05/21 0313 03/04/21  0401 03/03/21  0209 03/02/21  0230 03/01/21  1743   SODIUM mmol/L 137 133* 136 138 140   POTASSIUM mmol/L 3.5 3.7 4.1 4.1 3.6   CHLORIDE mmol/L 102 100 103 106 106   CO2 mmol/L 27.0 26.0 23.0 25.0 25.0   BUN mg/dL 7* 11 11 8 8   CREATININE mg/dL 0.58 0.64 0.63 0.62 0.68   GLUCOSE mg/dL 91 102* 99 119* 83   MAGNESIUM mg/dL  --   --   --  1.8  --      Cr Clearance Estimated Creatinine Clearance: 62.9 mL/min (by C-G  formula based on SCr of 0.58 mg/dL).  Coag     HbA1C No results found for: HGBA1C  Blood Glucose No results found for: POCGLU  Infection   Results from last 7 days   Lab Units 03/02/21  0723 03/02/21  0722   BLOODCX  No growth at 2 days No growth at 2 days     CMP   Results from last 7 days   Lab Units 03/05/21  0313 03/04/21  0401 03/03/21  0209 03/02/21  0230 03/01/21  1743   SODIUM mmol/L 137 133* 136 138 140   POTASSIUM mmol/L 3.5 3.7 4.1 4.1 3.6   CHLORIDE mmol/L 102 100 103 106 106   CO2 mmol/L 27.0 26.0 23.0 25.0 25.0   BUN mg/dL 7* 11 11 8 8   CREATININE mg/dL 0.58 0.64 0.63 0.62 0.68   GLUCOSE mg/dL 91 102* 99 119* 83   ALBUMIN g/dL  --   --   --   --  3.80   BILIRUBIN mg/dL  --   --   --   --  0.3   ALK PHOS U/L  --   --   --   --  64   AST (SGOT) U/L  --   --   --   --  20   ALT (SGPT) U/L  --   --   --   --  11     UA      Radiology(recent) No radiology results for the last day   Assessment:    Acute abdominal pain secondary to acute bowel perforation related to below  Status post elective exploratory laparotomy with right colon resection 3/2/2021  Acute pneumoperitoneum  Status post elective colonoscopy with polypectomy of a 3.5 cm polyp 3/1/2021  Postoperative anemia secondary to acute losses and distributive phenomenon  Primary essential hypertension  Gastroesophageal reflux disease without esophagitis  Perennial allergic rhinitis  Degenerative disc disease cervical spine  Raynaud's disease  History of mixed migraine headaches    Plan:  Postoperative pathway        Ke Talbot MD  03/05/21  06:47 EST

## 2021-03-06 ENCOUNTER — APPOINTMENT (OUTPATIENT)
Dept: GENERAL RADIOLOGY | Facility: HOSPITAL | Age: 71
End: 2021-03-06

## 2021-03-06 LAB
ANION GAP SERPL CALCULATED.3IONS-SCNC: 10 MMOL/L (ref 5–15)
BASOPHILS # BLD AUTO: 0 10*3/MM3 (ref 0–0.2)
BASOPHILS NFR BLD AUTO: 0.4 % (ref 0–1.5)
BUN SERPL-MCNC: 5 MG/DL (ref 8–23)
BUN/CREAT SERPL: 8.3 (ref 7–25)
CALCIUM SPEC-SCNC: 8.5 MG/DL (ref 8.6–10.5)
CHLORIDE SERPL-SCNC: 101 MMOL/L (ref 98–107)
CO2 SERPL-SCNC: 26 MMOL/L (ref 22–29)
CREAT SERPL-MCNC: 0.6 MG/DL (ref 0.57–1)
DEPRECATED RDW RBC AUTO: 46.4 FL (ref 37–54)
EOSINOPHIL # BLD AUTO: 0.1 10*3/MM3 (ref 0–0.4)
EOSINOPHIL NFR BLD AUTO: 2.3 % (ref 0.3–6.2)
ERYTHROCYTE [DISTWIDTH] IN BLOOD BY AUTOMATED COUNT: 14.5 % (ref 12.3–15.4)
GFR SERPL CREATININE-BSD FRML MDRD: 99 ML/MIN/1.73
GLUCOSE SERPL-MCNC: 93 MG/DL (ref 65–99)
HCT VFR BLD AUTO: 32.3 % (ref 34–46.6)
HGB BLD-MCNC: 11.2 G/DL (ref 12–15.9)
LYMPHOCYTES # BLD AUTO: 0.5 10*3/MM3 (ref 0.7–3.1)
LYMPHOCYTES NFR BLD AUTO: 8 % (ref 19.6–45.3)
MCH RBC QN AUTO: 31.7 PG (ref 26.6–33)
MCHC RBC AUTO-ENTMCNC: 34.6 G/DL (ref 31.5–35.7)
MCV RBC AUTO: 91.5 FL (ref 79–97)
MONOCYTES # BLD AUTO: 0.6 10*3/MM3 (ref 0.1–0.9)
MONOCYTES NFR BLD AUTO: 9.9 % (ref 5–12)
NEUTROPHILS NFR BLD AUTO: 5 10*3/MM3 (ref 1.7–7)
NEUTROPHILS NFR BLD AUTO: 79.4 % (ref 42.7–76)
NRBC BLD AUTO-RTO: 0 /100 WBC (ref 0–0.2)
PLATELET # BLD AUTO: 320 10*3/MM3 (ref 140–450)
PMV BLD AUTO: 7 FL (ref 6–12)
POTASSIUM SERPL-SCNC: 3.5 MMOL/L (ref 3.5–5.2)
RBC # BLD AUTO: 3.53 10*6/MM3 (ref 3.77–5.28)
SODIUM SERPL-SCNC: 137 MMOL/L (ref 136–145)
WBC # BLD AUTO: 6.3 10*3/MM3 (ref 3.4–10.8)

## 2021-03-06 PROCEDURE — 25010000002 PIPERACILLIN SOD-TAZOBACTAM PER 1 G: Performed by: FAMILY MEDICINE

## 2021-03-06 PROCEDURE — 74018 RADEX ABDOMEN 1 VIEW: CPT

## 2021-03-06 PROCEDURE — 85025 COMPLETE CBC W/AUTO DIFF WBC: CPT | Performed by: SURGERY

## 2021-03-06 PROCEDURE — 25010000002 ONDANSETRON PER 1 MG: Performed by: SURGERY

## 2021-03-06 PROCEDURE — 63710000001 ONDANSETRON PER 8 MG: Performed by: SURGERY

## 2021-03-06 PROCEDURE — 25010000002 METOCLOPRAMIDE PER 10 MG: Performed by: SURGERY

## 2021-03-06 PROCEDURE — 25010000002 MORPHINE PER 10 MG: Performed by: SURGERY

## 2021-03-06 PROCEDURE — 99024 POSTOP FOLLOW-UP VISIT: CPT | Performed by: SURGERY

## 2021-03-06 PROCEDURE — 80048 BASIC METABOLIC PNL TOTAL CA: CPT | Performed by: SURGERY

## 2021-03-06 PROCEDURE — 25010000002 PIPERACILLIN SOD-TAZOBACTAM PER 1 G: Performed by: SURGERY

## 2021-03-06 RX ORDER — PROMETHAZINE HYDROCHLORIDE 25 MG/1
25 SUPPOSITORY RECTAL EVERY 6 HOURS PRN
Status: DISCONTINUED | OUTPATIENT
Start: 2021-03-06 | End: 2021-03-06 | Stop reason: SDUPTHER

## 2021-03-06 RX ORDER — PROMETHAZINE HYDROCHLORIDE 25 MG/1
25 TABLET ORAL EVERY 6 HOURS PRN
Status: DISCONTINUED | OUTPATIENT
Start: 2021-03-06 | End: 2021-03-06 | Stop reason: SDUPTHER

## 2021-03-06 RX ADMIN — SODIUM CHLORIDE 100 ML/HR: 9 INJECTION, SOLUTION INTRAVENOUS at 10:28

## 2021-03-06 RX ADMIN — MORPHINE SULFATE 4 MG: 4 INJECTION INTRAVENOUS at 18:24

## 2021-03-06 RX ADMIN — SODIUM CHLORIDE, PRESERVATIVE FREE 3 ML: 5 INJECTION INTRAVENOUS at 08:30

## 2021-03-06 RX ADMIN — METOCLOPRAMIDE HYDROCHLORIDE 10 MG: 5 INJECTION INTRAMUSCULAR; INTRAVENOUS at 12:19

## 2021-03-06 RX ADMIN — PIPERACILLIN AND TAZOBACTAM 3.38 G: 3; .375 INJECTION, POWDER, LYOPHILIZED, FOR SOLUTION INTRAVENOUS at 21:54

## 2021-03-06 RX ADMIN — ONDANSETRON 4 MG: 2 INJECTION INTRAMUSCULAR; INTRAVENOUS at 14:49

## 2021-03-06 RX ADMIN — PANTOPRAZOLE SODIUM 40 MG: 40 TABLET, DELAYED RELEASE ORAL at 06:02

## 2021-03-06 RX ADMIN — METOCLOPRAMIDE HYDROCHLORIDE 10 MG: 5 INJECTION INTRAMUSCULAR; INTRAVENOUS at 21:55

## 2021-03-06 RX ADMIN — PIPERACILLIN AND TAZOBACTAM 3.38 G: 3; .375 INJECTION, POWDER, LYOPHILIZED, FOR SOLUTION INTRAVENOUS at 12:54

## 2021-03-06 RX ADMIN — METOCLOPRAMIDE HYDROCHLORIDE 10 MG: 5 INJECTION INTRAMUSCULAR; INTRAVENOUS at 08:30

## 2021-03-06 RX ADMIN — METOCLOPRAMIDE HYDROCHLORIDE 10 MG: 5 INJECTION INTRAMUSCULAR; INTRAVENOUS at 00:36

## 2021-03-06 RX ADMIN — ONDANSETRON HYDROCHLORIDE 4 MG: 4 TABLET, FILM COATED ORAL at 06:02

## 2021-03-06 RX ADMIN — SODIUM CHLORIDE, PRESERVATIVE FREE 3 ML: 5 INJECTION INTRAVENOUS at 21:55

## 2021-03-06 RX ADMIN — PROMETHAZINE HYDROCHLORIDE 12.5 MG: 12.5 SUPPOSITORY RECTAL at 18:23

## 2021-03-06 RX ADMIN — PIPERACILLIN AND TAZOBACTAM 3.38 G: 3; .375 INJECTION, POWDER, LYOPHILIZED, FOR SOLUTION INTRAVENOUS at 03:15

## 2021-03-06 RX ADMIN — PROMETHAZINE HYDROCHLORIDE 12.5 MG: 12.5 SUPPOSITORY RECTAL at 10:13

## 2021-03-06 RX ADMIN — SCOPALAMINE 1 PATCH: 1 PATCH, EXTENDED RELEASE TRANSDERMAL at 12:21

## 2021-03-06 NOTE — PROGRESS NOTES
LOS: 3 days   Patient Care Team:  Didi Talbot MD as PCP - General  Didi Talbot MD as PCP - Family Medicine    Reason for follow-up: Postop    Subjective   Only had a little bit of potato soup yesterday and started to have nausea but no vomiting.  Has had a small amount of loose stool.    Objective   Abdomen is soft with mild distention incisions healing without erythema or drainage TIKA drain is appropriate in quantity and character     Vital Signs  Vitals:    03/05/21 1248 03/05/21 1620 03/05/21 2100 03/06/21 0500   BP: 135/76 138/69 146/79 152/75   BP Location: Left arm Left arm     Patient Position: Lying Sitting     Pulse: 84 80 78 82   Resp: 16 18 22    Temp: 97.8 °F (36.6 °C) 97.3 °F (36.3 °C) 98.8 °F (37.1 °C) 98.4 °F (36.9 °C)   TempSrc: Oral Oral     SpO2: 95% 98% 97% 96%   Weight:    69.7 kg (153 lb 10.6 oz)   Height:             Results Review:       Lab Results (last 24 hours)     Procedure Component Value Units Date/Time    CBC & Differential [[id number]]  (Abnormal) Collected: 03/06/21 0452    Specimen: Blood Updated: 03/06/21 0857    Narrative:      The following orders were created for panel order CBC & Differential.  Procedure                               Abnormality         Status                     ---------                               -----------         ------                     CBC Auto Differential[[id number]]        Abnormal            Final result                 Please view results for these tests on the individual orders.    CBC Auto Differential [[id number]]  (Abnormal) Collected: 03/06/21 0452    Specimen: Blood Updated: 03/06/21 0857     WBC 6.30 10*3/mm3      RBC 3.53 10*6/mm3      Hemoglobin 11.2 g/dL      Hematocrit 32.3 %      MCV 91.5 fL      MCH 31.7 pg      MCHC 34.6 g/dL      RDW 14.5 %      RDW-SD 46.4 fl      MPV 7.0 fL      Platelets 320 10*3/mm3      Neutrophil % 79.4 %      Lymphocyte % 8.0 %      Monocyte % 9.9 %      Eosinophil % 2.3 %      Basophil % 0.4 %       Neutrophils, Absolute 5.00 10*3/mm3      Lymphocytes, Absolute 0.50 10*3/mm3      Monocytes, Absolute 0.60 10*3/mm3      Eosinophils, Absolute 0.10 10*3/mm3      Basophils, Absolute 0.00 10*3/mm3      nRBC 0.0 /100 WBC     Blood Culture - Blood, Hand, Right [[id number]] Collected: 03/02/21 0723    Specimen: Blood from Hand, Right Updated: 03/06/21 0730     Blood Culture No growth at 4 days    Blood Culture - Blood, Arm, Right [[id number]] Collected: 03/02/21 0722    Specimen: Blood from Arm, Right Updated: 03/06/21 0730     Blood Culture No growth at 4 days    Basic Metabolic Panel [[id number]]  (Abnormal) Collected: 03/06/21 0452    Specimen: Blood Updated: 03/06/21 0606     Glucose 93 mg/dL      BUN 5 mg/dL      Creatinine 0.60 mg/dL      Sodium 137 mmol/L      Potassium 3.5 mmol/L      Chloride 101 mmol/L      CO2 26.0 mmol/L      Calcium 8.5 mg/dL      eGFR Non African Amer 99 mL/min/1.73      BUN/Creatinine Ratio 8.3     Anion Gap 10.0 mmol/L     Narrative:      GFR Normal >60  Chronic Kidney Disease <60  Kidney Failure <15             Imaging Results (Last 24 Hours)     ** No results found for the last 24 hours. **          Medication Review:     Assessment/Plan         Generalized abdominal pain    Impression: Postop day #4 exploratory laparotomy with right colon resection with ileocolic anastomosis for perforated ascending colon from polypectomy.  Postop ileus  Plan:     KUB today  Nasogastric tube for decompression if gastric distention small bowel distention or significant persistent nausea  Ice chips some clear liquid sips for comfort          Anthony Gauthier MD  03/06/21  12:34 EST

## 2021-03-06 NOTE — PLAN OF CARE
Goal Outcome Evaluation:  Plan of Care Reviewed With: patient  Progress: declining     Nausea through the night since full liquid dinner. Diet backed down to clears per Dr. Talbot.

## 2021-03-06 NOTE — PROGRESS NOTES
LOS: 3 days   Patient Care Team:  Didi Talbot MD as PCP - General  Didi Talbot MD as PCP - Family Medicine    Subjective:  Pain better overall//quite nauseated    Objective:   No bowel movement//flatus noted//nausea//afebrile      Review of Systems:   Review of Systems   Constitutional: Positive for activity change and appetite change.   Respiratory: Negative.    Cardiovascular: Negative.    Gastrointestinal: Positive for abdominal pain, constipation and nausea. Negative for diarrhea.   Neurological: Positive for weakness.           Vital Signs  Temp:  [97.3 °F (36.3 °C)-98.8 °F (37.1 °C)] 98.4 °F (36.9 °C)  Heart Rate:  [78-84] 82  Resp:  [16-22] 22  BP: (135-152)/(69-79) 152/75    Physical Exam:  Physical Exam  Vitals and nursing note reviewed.   Constitutional:       Appearance: Normal appearance.   HENT:      Head: Normocephalic and atraumatic.   Cardiovascular:      Rate and Rhythm: Normal rate.      Heart sounds: Normal heart sounds.   Pulmonary:      Effort: Pulmonary effort is normal.   Abdominal:      Palpations: Abdomen is soft.      Tenderness: There is abdominal tenderness.   Skin:     General: Skin is warm.   Neurological:      General: No focal deficit present.      Mental Status: She is alert.          Radiology:  CT Abdomen Pelvis With Contrast    Result Date: 3/1/2021  1. There is a small amount of free air, compatible with bowel perforation (unless there has been recent surgery.) There is also a small/moderate amount of free fluid. This was discussed with Sarah Goodrich. 2. There is sigmoid diverticulosis. This makes it difficult to exclude a small amount of extraluminal gas around the sigmoid colon in the pelvis, although there is no one focal area of suspicion. 3. The thickened appearance of the wall of the gastric antrum may be due to gastritis or contraction and clinical correlation is recommended..  Electronically Signed By-Caridad Dover MD On:3/1/2021 7:43 PM This report was  finalized on [id number] by  Caridad Dover MD.         Results Review:     I reviewed the patient's new clinical results.  I reviewed the patient's new imaging results and agree with the interpretation.    Medication Review:   Scheduled Meds:Methylnaltrexone Bromide, 150 mg, Oral, Daily  metoclopramide, 10 mg, Intravenous, Q6H  pantoprazole, 40 mg, Oral, Q AM  piperacillin-tazobactam, 3.375 g, Intravenous, Q8H  Scopolamine, 1 patch, Transdermal, Q72H  sodium chloride, 3 mL, Intravenous, Q12H      Continuous Infusions:sodium chloride, 100 mL/hr, Last Rate: 100 mL/hr (03/05/21 0211)      PRN Meds:.•  HYDROcodone-acetaminophen  •  HYDROmorphone  •  HYDROmorphone **AND** naloxone  •  magnesium sulfate **OR** magnesium sulfate **OR** magnesium sulfate  •  Morphine **AND** naloxone  •  nitroglycerin  •  ondansetron **OR** ondansetron  •  oxyCODONE  •  phenol  •  potassium chloride  •  promethazine **OR** promethazine  •  promethazine **OR** promethazine  •  [COMPLETED] Insert peripheral IV **AND** sodium chloride  •  sodium chloride    Labs:    CBC    Results from last 7 days   Lab Units 03/05/21  0313 03/04/21  0401 03/03/21  0209 03/02/21  0230 03/01/21  1743   WBC 10*3/mm3 6.00 6.70 10.90* 13.60* 7.80   HEMOGLOBIN g/dL 9.9* 10.1* 11.3* 11.1* 12.7   PLATELETS 10*3/mm3 265 257 302 291 300     BMP   Results from last 7 days   Lab Units 03/06/21  0452 03/05/21  0313 03/04/21  0401 03/03/21  0209 03/02/21  0230 03/01/21  1743   SODIUM mmol/L 137 137 133* 136 138 140   POTASSIUM mmol/L 3.5 3.5 3.7 4.1 4.1 3.6   CHLORIDE mmol/L 101 102 100 103 106 106   CO2 mmol/L 26.0 27.0 26.0 23.0 25.0 25.0   BUN mg/dL 5* 7* 11 11 8 8   CREATININE mg/dL 0.60 0.58 0.64 0.63 0.62 0.68   GLUCOSE mg/dL 93 91 102* 99 119* 83   MAGNESIUM mg/dL  --   --   --   --  1.8  --      Cr Clearance Estimated Creatinine Clearance: 63 mL/min (by C-G formula based on SCr of 0.6 mg/dL).  Coag     HbA1C No results found for: HGBA1C  Blood Glucose No results  found for: POCGLU  Infection   Results from last 7 days   Lab Units 03/02/21  0723 03/02/21  0722   BLOODCX  No growth at 3 days No growth at 3 days     CMP   Results from last 7 days   Lab Units 03/06/21  0452 03/05/21  0313 03/04/21  0401 03/03/21  0209 03/02/21  0230 03/01/21  1743   SODIUM mmol/L 137 137 133* 136 138 140   POTASSIUM mmol/L 3.5 3.5 3.7 4.1 4.1 3.6   CHLORIDE mmol/L 101 102 100 103 106 106   CO2 mmol/L 26.0 27.0 26.0 23.0 25.0 25.0   BUN mg/dL 5* 7* 11 11 8 8   CREATININE mg/dL 0.60 0.58 0.64 0.63 0.62 0.68   GLUCOSE mg/dL 93 91 102* 99 119* 83   ALBUMIN g/dL  --   --   --   --   --  3.80   BILIRUBIN mg/dL  --   --   --   --   --  0.3   ALK PHOS U/L  --   --   --   --   --  64   AST (SGOT) U/L  --   --   --   --   --  20   ALT (SGPT) U/L  --   --   --   --   --  11     UA      Radiology(recent) No radiology results for the last day   Assessment:    Acute abdominal pain secondary to acute bowel perforation related to below  Status post elective exploratory laparotomy with right colon resection 3/2/2021  Acute pneumoperitoneum  Status post elective colonoscopy with polypectomy of a 3.5 cm polyp 3/1/2021  Postoperative anemia secondary to acute losses and distributive phenomenon  Primary essential hypertension  Gastroesophageal reflux disease without esophagitis  Perennial allergic rhinitis  Degenerative disc disease cervical spine  Raynaud's disease  History of mixed migraine headaches       Plan:  Postoperative pathway//ambulation//slow advance of diet        Ke Talbot MD  03/06/21  07:16 EST

## 2021-03-06 NOTE — PLAN OF CARE
Goal Outcome Evaluation:  Plan of Care Reviewed With: patient, spouse    Patient still severely nauseated and uncomfortable. Tried 2x to drop NG tube without success. Night RN will attempt. Patient no issue voiding. Will continue to monitor.

## 2021-03-07 LAB
ANION GAP SERPL CALCULATED.3IONS-SCNC: 12 MMOL/L (ref 5–15)
BACTERIA SPEC AEROBE CULT: NORMAL
BACTERIA SPEC AEROBE CULT: NORMAL
BASOPHILS # BLD AUTO: 0 10*3/MM3 (ref 0–0.2)
BASOPHILS NFR BLD AUTO: 0.4 % (ref 0–1.5)
BUN SERPL-MCNC: 11 MG/DL (ref 8–23)
BUN/CREAT SERPL: 16.9 (ref 7–25)
CALCIUM SPEC-SCNC: 7.9 MG/DL (ref 8.6–10.5)
CHLORIDE SERPL-SCNC: 104 MMOL/L (ref 98–107)
CO2 SERPL-SCNC: 22 MMOL/L (ref 22–29)
CREAT SERPL-MCNC: 0.65 MG/DL (ref 0.57–1)
DEPRECATED RDW RBC AUTO: 46.4 FL (ref 37–54)
EOSINOPHIL # BLD AUTO: 0.1 10*3/MM3 (ref 0–0.4)
EOSINOPHIL NFR BLD AUTO: 1.2 % (ref 0.3–6.2)
ERYTHROCYTE [DISTWIDTH] IN BLOOD BY AUTOMATED COUNT: 14.5 % (ref 12.3–15.4)
GFR SERPL CREATININE-BSD FRML MDRD: 91 ML/MIN/1.73
GLUCOSE SERPL-MCNC: 82 MG/DL (ref 65–99)
HCT VFR BLD AUTO: 30.8 % (ref 34–46.6)
HGB BLD-MCNC: 10.6 G/DL (ref 12–15.9)
LYMPHOCYTES # BLD AUTO: 0.7 10*3/MM3 (ref 0.7–3.1)
LYMPHOCYTES NFR BLD AUTO: 9.3 % (ref 19.6–45.3)
MCH RBC QN AUTO: 31.2 PG (ref 26.6–33)
MCHC RBC AUTO-ENTMCNC: 34.5 G/DL (ref 31.5–35.7)
MCV RBC AUTO: 90.4 FL (ref 79–97)
MONOCYTES # BLD AUTO: 0.9 10*3/MM3 (ref 0.1–0.9)
MONOCYTES NFR BLD AUTO: 11.4 % (ref 5–12)
NEUTROPHILS NFR BLD AUTO: 5.8 10*3/MM3 (ref 1.7–7)
NEUTROPHILS NFR BLD AUTO: 77.7 % (ref 42.7–76)
NRBC BLD AUTO-RTO: 0.1 /100 WBC (ref 0–0.2)
PLATELET # BLD AUTO: 348 10*3/MM3 (ref 140–450)
PMV BLD AUTO: 6.5 FL (ref 6–12)
POTASSIUM SERPL-SCNC: 3.4 MMOL/L (ref 3.5–5.2)
RBC # BLD AUTO: 3.4 10*6/MM3 (ref 3.77–5.28)
SODIUM SERPL-SCNC: 138 MMOL/L (ref 136–145)
WBC # BLD AUTO: 7.5 10*3/MM3 (ref 3.4–10.8)

## 2021-03-07 PROCEDURE — 25010000002 METOCLOPRAMIDE PER 10 MG: Performed by: SURGERY

## 2021-03-07 PROCEDURE — 25010000002 PIPERACILLIN SOD-TAZOBACTAM PER 1 G: Performed by: FAMILY MEDICINE

## 2021-03-07 PROCEDURE — 25010000003 POTASSIUM CHLORIDE 10 MEQ/100ML SOLUTION: Performed by: SURGERY

## 2021-03-07 PROCEDURE — 99024 POSTOP FOLLOW-UP VISIT: CPT | Performed by: SURGERY

## 2021-03-07 PROCEDURE — 25010000002 MORPHINE PER 10 MG: Performed by: SURGERY

## 2021-03-07 PROCEDURE — 85025 COMPLETE CBC W/AUTO DIFF WBC: CPT | Performed by: SURGERY

## 2021-03-07 PROCEDURE — 80048 BASIC METABOLIC PNL TOTAL CA: CPT | Performed by: FAMILY MEDICINE

## 2021-03-07 PROCEDURE — 25010000002 ONDANSETRON PER 1 MG: Performed by: SURGERY

## 2021-03-07 RX ADMIN — MORPHINE SULFATE 4 MG: 4 INJECTION INTRAVENOUS at 02:01

## 2021-03-07 RX ADMIN — PIPERACILLIN AND TAZOBACTAM 3.38 G: 3; .375 INJECTION, POWDER, LYOPHILIZED, FOR SOLUTION INTRAVENOUS at 15:48

## 2021-03-07 RX ADMIN — PIPERACILLIN AND TAZOBACTAM 3.38 G: 3; .375 INJECTION, POWDER, LYOPHILIZED, FOR SOLUTION INTRAVENOUS at 21:12

## 2021-03-07 RX ADMIN — POTASSIUM CHLORIDE 10 MEQ: 7.46 INJECTION, SOLUTION INTRAVENOUS at 13:15

## 2021-03-07 RX ADMIN — METOCLOPRAMIDE HYDROCHLORIDE 10 MG: 5 INJECTION INTRAMUSCULAR; INTRAVENOUS at 02:01

## 2021-03-07 RX ADMIN — SODIUM CHLORIDE, PRESERVATIVE FREE 3 ML: 5 INJECTION INTRAVENOUS at 09:00

## 2021-03-07 RX ADMIN — SODIUM CHLORIDE, PRESERVATIVE FREE 3 ML: 5 INJECTION INTRAVENOUS at 21:17

## 2021-03-07 RX ADMIN — METOCLOPRAMIDE HYDROCHLORIDE 10 MG: 5 INJECTION INTRAMUSCULAR; INTRAVENOUS at 21:11

## 2021-03-07 RX ADMIN — MORPHINE SULFATE 4 MG: 4 INJECTION INTRAVENOUS at 08:11

## 2021-03-07 RX ADMIN — MORPHINE SULFATE 4 MG: 4 INJECTION INTRAVENOUS at 15:47

## 2021-03-07 RX ADMIN — POTASSIUM CHLORIDE 10 MEQ: 7.46 INJECTION, SOLUTION INTRAVENOUS at 11:38

## 2021-03-07 RX ADMIN — METOCLOPRAMIDE HYDROCHLORIDE 10 MG: 5 INJECTION INTRAMUSCULAR; INTRAVENOUS at 15:47

## 2021-03-07 RX ADMIN — ONDANSETRON 4 MG: 2 INJECTION INTRAMUSCULAR; INTRAVENOUS at 21:35

## 2021-03-07 RX ADMIN — POTASSIUM CHLORIDE 10 MEQ: 7.46 INJECTION, SOLUTION INTRAVENOUS at 09:17

## 2021-03-07 RX ADMIN — PIPERACILLIN AND TAZOBACTAM 3.38 G: 3; .375 INJECTION, POWDER, LYOPHILIZED, FOR SOLUTION INTRAVENOUS at 04:37

## 2021-03-07 RX ADMIN — METOCLOPRAMIDE HYDROCHLORIDE 10 MG: 5 INJECTION INTRAMUSCULAR; INTRAVENOUS at 08:11

## 2021-03-07 RX ADMIN — MORPHINE SULFATE 4 MG: 4 INJECTION INTRAVENOUS at 21:11

## 2021-03-07 NOTE — PROGRESS NOTES
LOS: 4 days   Patient Care Team:  Didi Talbot MD as PCP - General  Didi Talbot MD as PCP - Family Medicine    Reason for follow-up: Postop    Subjective   Couple of bowel movements yesterday but none since the nasogastric tube was placed.  About 1000 mL of effluent from nasogastric tube with immediate resolution of her nausea.    Objective   Abdomen is soft minimally tender to palpation TIKA drain is serosanguineous    Vital Signs  Vitals:    03/06/21 1248 03/06/21 2100 03/06/21 2330 03/07/21 0400   BP: 143/80 145/85 148/69 149/78   BP Location: Left arm Left arm Left arm    Patient Position: Lying Lying Lying    Pulse: 89 85  84   Resp: 14 15 14 15   Temp: 98.2 °F (36.8 °C) 97.1 °F (36.2 °C) 98.8 °F (37.1 °C) 98.8 °F (37.1 °C)   TempSrc: Oral Oral Oral Oral   SpO2: 97% 97% 96% 94%   Weight:       Height:             Results Review:       Lab Results (last 24 hours)     Procedure Component Value Units Date/Time    Blood Culture - Blood, Hand, Right [[id number]] Collected: 03/02/21 0723    Specimen: Blood from Hand, Right Updated: 03/07/21 0730     Blood Culture No growth at 5 days    Blood Culture - Blood, Arm, Right [[id number]] Collected: 03/02/21 0722    Specimen: Blood from Arm, Right Updated: 03/07/21 0730     Blood Culture No growth at 5 days    Basic Metabolic Panel [[id number]]  (Abnormal) Collected: 03/07/21 0340    Specimen: Blood Updated: 03/07/21 0416     Glucose 82 mg/dL      BUN 11 mg/dL      Creatinine 0.65 mg/dL      Sodium 138 mmol/L      Potassium 3.4 mmol/L      Chloride 104 mmol/L      CO2 22.0 mmol/L      Calcium 7.9 mg/dL      eGFR Non African Amer 91 mL/min/1.73      BUN/Creatinine Ratio 16.9     Anion Gap 12.0 mmol/L     Narrative:      GFR Normal >60  Chronic Kidney Disease <60  Kidney Failure <15      CBC & Differential [[id number]]  (Abnormal) Collected: 03/07/21 0340    Specimen: Blood Updated: 03/07/21 0356    Narrative:      The following orders were created for panel order CBC &  Differential.  Procedure                               Abnormality         Status                     ---------                               -----------         ------                     CBC Auto Differential[[id number]]        Abnormal            Final result                 Please view results for these tests on the individual orders.    CBC Auto Differential [[id number]]  (Abnormal) Collected: 03/07/21 0340    Specimen: Blood Updated: 03/07/21 0356     WBC 7.50 10*3/mm3      RBC 3.40 10*6/mm3      Hemoglobin 10.6 g/dL      Hematocrit 30.8 %      MCV 90.4 fL      MCH 31.2 pg      MCHC 34.5 g/dL      RDW 14.5 %      RDW-SD 46.4 fl      MPV 6.5 fL      Platelets 348 10*3/mm3      Neutrophil % 77.7 %      Lymphocyte % 9.3 %      Monocyte % 11.4 %      Eosinophil % 1.2 %      Basophil % 0.4 %      Neutrophils, Absolute 5.80 10*3/mm3      Lymphocytes, Absolute 0.70 10*3/mm3      Monocytes, Absolute 0.90 10*3/mm3      Eosinophils, Absolute 0.10 10*3/mm3      Basophils, Absolute 0.00 10*3/mm3      nRBC 0.1 /100 WBC            Imaging Results (Last 24 Hours)     ** No results found for the last 24 hours. **          Medication Review:     Assessment/Plan         Generalized abdominal pain    Impression: Postop day #5 exploratory laparotomy with right colon resection with ileocolic anastomosis for perforated ascending colon from polypectomy.  Postop ileus  Plan:     KUB today  If she has additional flatus and/or bowel movement in the nasogastric tube output is decreasing may try to get rid of tonight versus tomorrow.          Anthony Gauthier MD  03/07/21  10:38 EST

## 2021-03-07 NOTE — PLAN OF CARE
Goal Outcome Evaluation:  Plan of Care Reviewed With: patient, spouse  Progress: improving     Patient's nausea better since NG tube placed. Throat sore from tube and moderate pain in abdomen but overall, patient improved. Patient ambulated 3x around unit with spouse. Some watery stool. Pain managed. Will continue to monitor.

## 2021-03-07 NOTE — PLAN OF CARE
Goal Outcome Evaluation:  Plan of Care Reviewed With: patient  Progress: declining       NG tube to LIWS place at beginning of shift. Patient NPO with sips and ice chips. Procedure well tolerated. Patient reports nausea has subsided.

## 2021-03-07 NOTE — PROGRESS NOTES
LOS: 4 days   Patient Care Team:  Didi Talbot MD as PCP - General  Ddii Talbot MD as PCP - Family Medicine    Subjective:  Much improved after placement of nasogastric tube.  As expected, she has a sore throat.    Objective:   Afebrile      Review of Systems:   Review of Systems   Constitutional: Positive for activity change.   Respiratory: Negative.    Cardiovascular: Negative.    Gastrointestinal: Positive for abdominal pain and constipation. Negative for anal bleeding and diarrhea.   Genitourinary: Negative.            Vital Signs  Temp:  [97.1 °F (36.2 °C)-98.8 °F (37.1 °C)] 98.8 °F (37.1 °C)  Heart Rate:  [84-89] 84  Resp:  [14-15] 15  BP: (143-149)/(69-85) 149/78    Physical Exam:  Physical Exam  Vitals and nursing note reviewed.   Constitutional:       Appearance: Normal appearance.   HENT:      Head: Normocephalic and atraumatic.   Cardiovascular:      Rate and Rhythm: Normal rate.      Pulses: Normal pulses.   Pulmonary:      Effort: Pulmonary effort is normal. No respiratory distress.      Breath sounds: Normal breath sounds.   Abdominal:      General: There is no distension.      Palpations: Abdomen is soft.      Tenderness: There is abdominal tenderness. There is no guarding or rebound.   Skin:     General: Skin is warm.   Neurological:      Mental Status: She is alert.          Radiology:  CT Abdomen Pelvis With Contrast    Result Date: 3/1/2021  1. There is a small amount of free air, compatible with bowel perforation (unless there has been recent surgery.) There is also a small/moderate amount of free fluid. This was discussed with Sarah Goodrich. 2. There is sigmoid diverticulosis. This makes it difficult to exclude a small amount of extraluminal gas around the sigmoid colon in the pelvis, although there is no one focal area of suspicion. 3. The thickened appearance of the wall of the gastric antrum may be due to gastritis or contraction and clinical correlation is recommended..   Electronically Signed By-Caridad Dover MD On:3/1/2021 7:43 PM This report was finalized on [id number] by  Caridad Dover MD.    XR Abdomen KUB    Result Date: 3/6/2021  Evidence of recent surgery with mild gaseous distention of the small bowel, which could reflect ileus, less likely obstruction.  Electronically Signed By-Gadiel Cruz MD On:3/6/2021 2:19 PM This report was finalized on [id number] by  Gadiel Cruz MD.         Results Review:     I reviewed the patient's new clinical results.  I reviewed the patient's new imaging results and agree with the interpretation.    Medication Review:   Scheduled Meds:Methylnaltrexone Bromide, 150 mg, Oral, Daily  metoclopramide, 10 mg, Intravenous, Q6H  pantoprazole, 40 mg, Oral, Q AM  piperacillin-tazobactam, 3.375 g, Intravenous, Q8H  Scopolamine, 1 patch, Transdermal, Q72H  sodium chloride, 3 mL, Intravenous, Q12H      Continuous Infusions:sodium chloride, 100 mL/hr, Last Rate: 100 mL/hr (03/06/21 1028)      PRN Meds:.•  HYDROcodone-acetaminophen  •  HYDROmorphone  •  HYDROmorphone **AND** naloxone  •  magnesium sulfate **OR** magnesium sulfate **OR** magnesium sulfate  •  Morphine **AND** naloxone  •  nitroglycerin  •  ondansetron **OR** ondansetron  •  oxyCODONE  •  phenol  •  potassium chloride  •  promethazine **OR** promethazine  •  [COMPLETED] Insert peripheral IV **AND** sodium chloride  •  sodium chloride    Labs:    CBC    Results from last 7 days   Lab Units 03/07/21  0340 03/06/21  0452 03/05/21  0313 03/04/21  0401 03/03/21  0209 03/02/21  0230 03/01/21  1743   WBC 10*3/mm3 7.50 6.30 6.00 6.70 10.90* 13.60* 7.80   HEMOGLOBIN g/dL 10.6* 11.2* 9.9* 10.1* 11.3* 11.1* 12.7   PLATELETS 10*3/mm3 348 320 265 257 302 291 300     BMP   Results from last 7 days   Lab Units 03/07/21  0340 03/06/21  0452 03/05/21  0313 03/04/21  0401 03/03/21  0209 03/02/21  0230 03/01/21  1743   SODIUM mmol/L 138 137 137 133* 136 138 140   POTASSIUM mmol/L 3.4* 3.5 3.5 3.7 4.1  4.1 3.6   CHLORIDE mmol/L 104 101 102 100 103 106 106   CO2 mmol/L 22.0 26.0 27.0 26.0 23.0 25.0 25.0   BUN mg/dL 11 5* 7* 11 11 8 8   CREATININE mg/dL 0.65 0.60 0.58 0.64 0.63 0.62 0.68   GLUCOSE mg/dL 82 93 91 102* 99 119* 83   MAGNESIUM mg/dL  --   --   --   --   --  1.8  --      Cr Clearance Estimated Creatinine Clearance: 63 mL/min (by C-G formula based on SCr of 0.65 mg/dL).  Coag     HbA1C No results found for: HGBA1C  Blood Glucose No results found for: POCGLU  Infection   Results from last 7 days   Lab Units 03/02/21  0723 03/02/21  0722   BLOODCX  No growth at 4 days No growth at 4 days     CMP   Results from last 7 days   Lab Units 03/07/21  0340 03/06/21  0452 03/05/21  0313 03/04/21  0401 03/03/21  0209 03/02/21  0230 03/01/21  1743   SODIUM mmol/L 138 137 137 133* 136 138 140   POTASSIUM mmol/L 3.4* 3.5 3.5 3.7 4.1 4.1 3.6   CHLORIDE mmol/L 104 101 102 100 103 106 106   CO2 mmol/L 22.0 26.0 27.0 26.0 23.0 25.0 25.0   BUN mg/dL 11 5* 7* 11 11 8 8   CREATININE mg/dL 0.65 0.60 0.58 0.64 0.63 0.62 0.68   GLUCOSE mg/dL 82 93 91 102* 99 119* 83   ALBUMIN g/dL  --   --   --   --   --   --  3.80   BILIRUBIN mg/dL  --   --   --   --   --   --  0.3   ALK PHOS U/L  --   --   --   --   --   --  64   AST (SGOT) U/L  --   --   --   --   --   --  20   ALT (SGPT) U/L  --   --   --   --   --   --  11     UA      Radiology(recent) XR Abdomen KUB    Result Date: 3/6/2021  Evidence of recent surgery with mild gaseous distention of the small bowel, which could reflect ileus, less likely obstruction.  Electronically Signed By-Gadiel Cruz MD On:3/6/2021 2:19 PM This report was finalized on [id number] by  Gadiel Cruz MD.     Assessment:  Acute abdominal pain secondary to acute bowel perforation related to polypectomy as below  Status post elective exploratory laparotomy with right colon resection 3/2/2021  Acute pneumoperitoneum  Status post elective colonoscopy with polypectomy of a 3.5 cm polyp  3/1/2021  Postoperative anemia secondary to acute losses and distributive phenomenon  Primary essential hypertension  Gastroesophageal reflux disease without esophagitis  Perennial allergic rhinitis  Degenerative disc disease cervical spine  Raynaud's disease  History of mixed migraine headaches    Plan:  Continue bowel rest//continue nasogastric tube when able        Ke Talbot MD  03/07/21  06:18 EST

## 2021-03-08 LAB
ANION GAP SERPL CALCULATED.3IONS-SCNC: 15 MMOL/L (ref 5–15)
BASOPHILS # BLD AUTO: 0.1 10*3/MM3 (ref 0–0.2)
BASOPHILS NFR BLD AUTO: 0.9 % (ref 0–1.5)
BUN SERPL-MCNC: 14 MG/DL (ref 8–23)
BUN/CREAT SERPL: 20.3 (ref 7–25)
CALCIUM SPEC-SCNC: 7.8 MG/DL (ref 8.6–10.5)
CHLORIDE SERPL-SCNC: 102 MMOL/L (ref 98–107)
CO2 SERPL-SCNC: 20 MMOL/L (ref 22–29)
CREAT SERPL-MCNC: 0.69 MG/DL (ref 0.57–1)
DEPRECATED RDW RBC AUTO: 47.7 FL (ref 37–54)
EOSINOPHIL # BLD AUTO: 0.2 10*3/MM3 (ref 0–0.4)
EOSINOPHIL NFR BLD AUTO: 3.1 % (ref 0.3–6.2)
ERYTHROCYTE [DISTWIDTH] IN BLOOD BY AUTOMATED COUNT: 14.7 % (ref 12.3–15.4)
GFR SERPL CREATININE-BSD FRML MDRD: 85 ML/MIN/1.73
GLUCOSE BLDC GLUCOMTR-MCNC: 178 MG/DL (ref 70–105)
GLUCOSE BLDC GLUCOMTR-MCNC: 40 MG/DL (ref 70–105)
GLUCOSE BLDC GLUCOMTR-MCNC: 47 MG/DL (ref 70–105)
GLUCOSE SERPL-MCNC: 53 MG/DL (ref 65–99)
HCT VFR BLD AUTO: 31.5 % (ref 34–46.6)
HGB BLD-MCNC: 10.5 G/DL (ref 12–15.9)
LYMPHOCYTES # BLD AUTO: 0.8 10*3/MM3 (ref 0.7–3.1)
LYMPHOCYTES NFR BLD AUTO: 11.2 % (ref 19.6–45.3)
MCH RBC QN AUTO: 31.2 PG (ref 26.6–33)
MCHC RBC AUTO-ENTMCNC: 33.5 G/DL (ref 31.5–35.7)
MCV RBC AUTO: 93.3 FL (ref 79–97)
MONOCYTES # BLD AUTO: 0.9 10*3/MM3 (ref 0.1–0.9)
MONOCYTES NFR BLD AUTO: 12 % (ref 5–12)
NEUTROPHILS NFR BLD AUTO: 5.3 10*3/MM3 (ref 1.7–7)
NEUTROPHILS NFR BLD AUTO: 72.8 % (ref 42.7–76)
NRBC BLD AUTO-RTO: 0 /100 WBC (ref 0–0.2)
PLATELET # BLD AUTO: 355 10*3/MM3 (ref 140–450)
PMV BLD AUTO: 6.9 FL (ref 6–12)
POTASSIUM SERPL-SCNC: 3.6 MMOL/L (ref 3.5–5.2)
POTASSIUM SERPL-SCNC: 3.7 MMOL/L (ref 3.5–5.2)
RBC # BLD AUTO: 3.37 10*6/MM3 (ref 3.77–5.28)
SODIUM SERPL-SCNC: 137 MMOL/L (ref 136–145)
WBC # BLD AUTO: 7.2 10*3/MM3 (ref 3.4–10.8)

## 2021-03-08 PROCEDURE — 84132 ASSAY OF SERUM POTASSIUM: CPT | Performed by: FAMILY MEDICINE

## 2021-03-08 PROCEDURE — 25010000002 PIPERACILLIN SOD-TAZOBACTAM PER 1 G: Performed by: FAMILY MEDICINE

## 2021-03-08 PROCEDURE — 25010000003 POTASSIUM CHLORIDE 10 MEQ/100ML SOLUTION: Performed by: SURGERY

## 2021-03-08 PROCEDURE — 85025 COMPLETE CBC W/AUTO DIFF WBC: CPT | Performed by: SURGERY

## 2021-03-08 PROCEDURE — 82962 GLUCOSE BLOOD TEST: CPT

## 2021-03-08 PROCEDURE — 25010000002 METOCLOPRAMIDE PER 10 MG: Performed by: SURGERY

## 2021-03-08 PROCEDURE — 80048 BASIC METABOLIC PNL TOTAL CA: CPT | Performed by: SURGERY

## 2021-03-08 RX ORDER — NICOTINE POLACRILEX 4 MG
15 LOZENGE BUCCAL
Status: DISCONTINUED | OUTPATIENT
Start: 2021-03-08 | End: 2021-03-10 | Stop reason: HOSPADM

## 2021-03-08 RX ORDER — POTASSIUM CHLORIDE 7.45 MG/ML
10 INJECTION INTRAVENOUS
Status: DISCONTINUED | OUTPATIENT
Start: 2021-03-08 | End: 2021-03-10 | Stop reason: HOSPADM

## 2021-03-08 RX ORDER — POTASSIUM CHLORIDE 20 MEQ/1
40 TABLET, EXTENDED RELEASE ORAL AS NEEDED
Status: DISCONTINUED | OUTPATIENT
Start: 2021-03-08 | End: 2021-03-10 | Stop reason: HOSPADM

## 2021-03-08 RX ORDER — POTASSIUM CHLORIDE 1.5 G/1.77G
40 POWDER, FOR SOLUTION ORAL AS NEEDED
Status: DISCONTINUED | OUTPATIENT
Start: 2021-03-08 | End: 2021-03-10 | Stop reason: HOSPADM

## 2021-03-08 RX ORDER — DEXTROSE MONOHYDRATE 25 G/50ML
25 INJECTION, SOLUTION INTRAVENOUS
Status: DISCONTINUED | OUTPATIENT
Start: 2021-03-08 | End: 2021-03-10 | Stop reason: HOSPADM

## 2021-03-08 RX ADMIN — PIPERACILLIN AND TAZOBACTAM 3.38 G: 3; .375 INJECTION, POWDER, LYOPHILIZED, FOR SOLUTION INTRAVENOUS at 13:04

## 2021-03-08 RX ADMIN — POTASSIUM CHLORIDE 40 MEQ: 1500 TABLET, EXTENDED RELEASE ORAL at 13:05

## 2021-03-08 RX ADMIN — METOCLOPRAMIDE HYDROCHLORIDE 10 MG: 5 INJECTION INTRAMUSCULAR; INTRAVENOUS at 13:05

## 2021-03-08 RX ADMIN — DEXTROSE MONOHYDRATE 25 G: 25 INJECTION, SOLUTION INTRAVENOUS at 04:43

## 2021-03-08 RX ADMIN — METOCLOPRAMIDE HYDROCHLORIDE 10 MG: 5 INJECTION INTRAMUSCULAR; INTRAVENOUS at 01:56

## 2021-03-08 RX ADMIN — SODIUM CHLORIDE, PRESERVATIVE FREE 3 ML: 5 INJECTION INTRAVENOUS at 21:03

## 2021-03-08 RX ADMIN — METOCLOPRAMIDE HYDROCHLORIDE 10 MG: 5 INJECTION INTRAMUSCULAR; INTRAVENOUS at 19:30

## 2021-03-08 RX ADMIN — Medication 10 ML: at 04:44

## 2021-03-08 RX ADMIN — HYDROCODONE BITARTRATE AND ACETAMINOPHEN 1 TABLET: 5; 325 TABLET ORAL at 21:02

## 2021-03-08 RX ADMIN — METOCLOPRAMIDE HYDROCHLORIDE 10 MG: 5 INJECTION INTRAMUSCULAR; INTRAVENOUS at 08:58

## 2021-03-08 RX ADMIN — PANTOPRAZOLE SODIUM 40 MG: 40 TABLET, DELAYED RELEASE ORAL at 05:37

## 2021-03-08 RX ADMIN — POTASSIUM CHLORIDE 40 MEQ: 1500 TABLET, EXTENDED RELEASE ORAL at 17:13

## 2021-03-08 RX ADMIN — SODIUM CHLORIDE, PRESERVATIVE FREE 3 ML: 5 INJECTION INTRAVENOUS at 09:01

## 2021-03-08 RX ADMIN — PIPERACILLIN AND TAZOBACTAM 3.38 G: 3; .375 INJECTION, POWDER, LYOPHILIZED, FOR SOLUTION INTRAVENOUS at 04:18

## 2021-03-08 NOTE — PLAN OF CARE
Goal Outcome Evaluation:  Plan of Care Reviewed With: patient  Progress: improving     Patients pain being controlled with IV medication. NG in place with little output noted. Patient ambulates stand by assist. No nausea noted during shift. Low blood sugar noted in AM and treated per MAR. Two small bowel movements noted on shift. VSS.

## 2021-03-08 NOTE — PROGRESS NOTES
LOS: 5 days   Patient Care Team:  Didi Talbot MD as PCP - General  Didi Talbot MD as PCP - Family Medicine    Subjective:  Bowel movement last night//sore throat//looks and feels better    Objective:   Diarrhea//afebrile//had an episode of hypoglycemia with good response to glucose load      Review of Systems:   Review of Systems   Constitutional: Positive for activity change and appetite change.   Respiratory: Negative.    Cardiovascular: Negative.    Gastrointestinal: Positive for abdominal pain and diarrhea. Negative for blood in stool, constipation, nausea and rectal pain.   Neurological: Positive for weakness.           Vital Signs  Temp:  [98.1 °F (36.7 °C)-98.7 °F (37.1 °C)] 98.4 °F (36.9 °C)  Heart Rate:  [74-88] 88  Resp:  [14-16] 16  BP: (146-169)/(68-70) 159/70    Physical Exam:  Physical Exam  Vitals and nursing note reviewed.   Constitutional:       Appearance: Normal appearance.   HENT:      Head: Normocephalic and atraumatic.   Cardiovascular:      Rate and Rhythm: Normal rate.   Pulmonary:      Breath sounds: Normal breath sounds.   Abdominal:      General: There is no distension.      Palpations: Abdomen is soft.      Tenderness: There is abdominal tenderness. There is no guarding or rebound.   Skin:     General: Skin is warm and dry.   Neurological:      General: No focal deficit present.      Mental Status: She is alert and oriented to person, place, and time. Mental status is at baseline.          Radiology:  CT Abdomen Pelvis With Contrast    Result Date: 3/1/2021  1. There is a small amount of free air, compatible with bowel perforation (unless there has been recent surgery.) There is also a small/moderate amount of free fluid. This was discussed with Sarah Goodrich. 2. There is sigmoid diverticulosis. This makes it difficult to exclude a small amount of extraluminal gas around the sigmoid colon in the pelvis, although there is no one focal area of suspicion. 3. The thickened  appearance of the wall of the gastric antrum may be due to gastritis or contraction and clinical correlation is recommended..  Electronically Signed By-Caridad Dover MD On:3/1/2021 7:43 PM This report was finalized on [id number] by  Caridad Dover MD.    XR Abdomen KUB    Result Date: 3/6/2021  Evidence of recent surgery with mild gaseous distention of the small bowel, which could reflect ileus, less likely obstruction.  Electronically Signed By-Gadiel Cruz MD On:3/6/2021 2:19 PM This report was finalized on [id number] by  Gadiel Cruz MD.         Results Review:     I reviewed the patient's new clinical results.  I reviewed the patient's new imaging results and agree with the interpretation.    Medication Review:   Scheduled Meds:metoclopramide, 10 mg, Intravenous, Q6H  pantoprazole, 40 mg, Oral, Q AM  piperacillin-tazobactam, 3.375 g, Intravenous, Q8H  Scopolamine, 1 patch, Transdermal, Q72H  sodium chloride, 3 mL, Intravenous, Q12H      Continuous Infusions:sodium chloride, 100 mL/hr, Last Rate: 100 mL/hr (03/06/21 1028)      PRN Meds:.•  dextrose  •  dextrose  •  glucagon (human recombinant)  •  HYDROcodone-acetaminophen  •  HYDROmorphone  •  HYDROmorphone **AND** naloxone  •  magnesium sulfate **OR** magnesium sulfate **OR** magnesium sulfate  •  Morphine **AND** naloxone  •  nitroglycerin  •  ondansetron **OR** ondansetron  •  oxyCODONE  •  phenol  •  potassium chloride  •  promethazine **OR** promethazine  •  [COMPLETED] Insert peripheral IV **AND** sodium chloride  •  sodium chloride    Labs:    CBC    Results from last 7 days   Lab Units 03/08/21  0310 03/07/21  0340 03/06/21  0452 03/05/21  0313 03/04/21  0401 03/03/21  0209 03/02/21  0230   WBC 10*3/mm3 7.20 7.50 6.30 6.00 6.70 10.90* 13.60*   HEMOGLOBIN g/dL 10.5* 10.6* 11.2* 9.9* 10.1* 11.3* 11.1*   PLATELETS 10*3/mm3 355 348 320 265 257 302 291     BMP   Results from last 7 days   Lab Units 03/08/21  0310 03/07/21  0340 03/06/21  0452  03/05/21  0313 03/04/21  0401 03/03/21  0209 03/02/21  0230   SODIUM mmol/L 137 138 137 137 133* 136 138   POTASSIUM mmol/L 3.6 3.4* 3.5 3.5 3.7 4.1 4.1   CHLORIDE mmol/L 102 104 101 102 100 103 106   CO2 mmol/L 20.0* 22.0 26.0 27.0 26.0 23.0 25.0   BUN mg/dL 14 11 5* 7* 11 11 8   CREATININE mg/dL 0.69 0.65 0.60 0.58 0.64 0.63 0.62   GLUCOSE mg/dL 53* 82 93 91 102* 99 119*   MAGNESIUM mg/dL  --   --   --   --   --   --  1.8     Cr Clearance Estimated Creatinine Clearance: 63 mL/min (by C-G formula based on SCr of 0.69 mg/dL).  Coag     HbA1C No results found for: HGBA1C  Blood Glucose   Glucose   Date/Time Value Ref Range Status   03/08/2021 0500 178 (H) 70 - 105 mg/dL Final     Comment:     Serial Number: [id number]:  [id number]   03/08/2021 0440 40 (L) 70 - 105 mg/dL Final     Comment:     Serial Number: [id number]:  [id number]   03/08/2021 0426 47 (L) 70 - 105 mg/dL Final     Comment:     Serial Number: [id number]:  [id number]     Infection   Results from last 7 days   Lab Units 03/02/21  0723 03/02/21  0722   BLOODCX  No growth at 5 days No growth at 5 days     CMP   Results from last 7 days   Lab Units 03/08/21  0310 03/07/21  0340 03/06/21  0452 03/05/21  0313 03/04/21  0401 03/03/21  0209 03/02/21  0230 03/01/21  1743   SODIUM mmol/L 137 138 137 137 133* 136 138 140   POTASSIUM mmol/L 3.6 3.4* 3.5 3.5 3.7 4.1 4.1 3.6   CHLORIDE mmol/L 102 104 101 102 100 103 106 106   CO2 mmol/L 20.0* 22.0 26.0 27.0 26.0 23.0 25.0 25.0   BUN mg/dL 14 11 5* 7* 11 11 8 8   CREATININE mg/dL 0.69 0.65 0.60 0.58 0.64 0.63 0.62 0.68   GLUCOSE mg/dL 53* 82 93 91 102* 99 119* 83   ALBUMIN g/dL  --   --   --   --   --   --   --  3.80   BILIRUBIN mg/dL  --   --   --   --   --   --   --  0.3   ALK PHOS U/L  --   --   --   --   --   --   --  64   AST (SGOT) U/L  --   --   --   --   --   --   --  20   ALT (SGPT) U/L  --   --   --   --   --   --   --  11     UA      Radiology(recent) XR Abdomen KUB    Result Date:  3/6/2021  Evidence of recent surgery with mild gaseous distention of the small bowel, which could reflect ileus, less likely obstruction.  Electronically Signed By-Gadiel Cruz MD On:3/6/2021 2:19 PM This report was finalized on [id number] by  Gadiel Cruz MD.     Assessment:      Acute abdominal pain secondary to acute bowel perforation related to polypectomy as below  Status post elective exploratory laparotomy with right colon resection 3/2/2021  Acute reactive hypoglycemia  Acute pneumoperitoneum  Status post elective colonoscopy with polypectomy of a 3.5 cm polyp 3/1/2021  Postoperative anemia secondary to acute losses and distributive phenomenon  Primary essential hypertension  Gastroesophageal reflux disease without esophagitis  Perennial allergic rhinitis  Degenerative disc disease cervical spine  Raynaud's disease  History of mixed migraine headaches    Plan:  Discontinue nasogastric tube/ambulate today/postoperative pathway/begin diet        Ke Talbot MD  03/08/21  06:48 EST

## 2021-03-08 NOTE — PROGRESS NOTES
Continued Stay Note  [facility]     Patient Name: Mariajose Tabor  MRN: [id number]  Today's Date: 3/8/2021    Admit Date: 3/1/2021    Discharge Plan     Row Name 03/08/21 1314       Plan    Plan  Routine discharge home with spouse    Patient/Family in Agreement with Plan  yes    Plan Comments  Barriers to discharge: POD#6 resection. NGT removed. Patient with a postop ileus over the weekend.        Expected Discharge Date and Time     Expected Discharge Date Expected Discharge Time    Mar 9, 2021               Anna Naegele RN Case Manager  [address]  [address]   [phone]  office  [phone]  fax  [email or web address]

## 2021-03-08 NOTE — CONSULTS
"Nutrition Services    Patient Name: Mariajose Tabor  YOB: 1950  MRN: [id number]  Admission date: 3/1/2021    Pt has now been either NPO or on liquids x 8 days - if unable to advance diet within 1-2 days, may need to consider alternative route for nutrition.     Advance diet as appropriate per surgeon's orders; suggest eventual GOAL of Low Residue diet.    PPE Documentation        PPE Worn By Provider Mask and Eye Protection    PPE Worn By Patient  None      CLINICAL NUTRITION ASSESSMENT      Reason for Assessment 3/8: Follow up protocol   3/4: NPO/CLD x 3 days      H&P:  Per H&P \"The patient was in her usual state of health until the day of presentation when the patient had a scheduled high risk elective colonoscopy with polypectomy.  The patient immediately began to experience some discomfort and was observed.  She was deemed stable for discharge home but began to experience some progressive abdominal discomfort which was refractory to over-the-counter medications.  She presented to the [facility] emergency room for evaluation where she was found to have acute bowel perforation with pneumoperitoneum\"     Past Medical History:   Diagnosis Date   • GERD (gastroesophageal reflux disease)    • Hypertension    • Migraine        Past Surgical History:   Procedure Laterality Date   • CATARACT EXTRACTION     • COLONOSCOPY     • DILATATION AND CURETTAGE     • EXPLORATORY LAPAROTOMY N/A 3/2/2021    Procedure: LAPAROTOMY EXPLORATORY with RIGHT COLON RESECTION;  Surgeon: Pedro Wren DO;  Location: [facility];  Service: General;  Laterality: N/A;   • HERNIA REPAIR          Current Problems:   Acute abdominal pain secondary to acute bowel perforation  - 3/2 S/p elective exploratory laparotomy with right colon resection  - Gen Sx following   - Post-op ileus    Acute pneumoperitoneum    Status post elective colonoscopy with polypectomy of a 3.5 cm polyp 3/1/2021    Primary essential " "hypertension    Gastroesophageal reflux disease without esophagitis    Perennial allergic rhinitis    Degenerative disc disease cervical spine    Raynaud's disease    History of mixed migraine headaches       Nutrition/Diet History         Narrative     3/8: Pt assessed for follow up - remains on liquids only due to post-op ileus; needed NG placed over the weekend for decompression. Today, however, pt without N/V and actually has had small BM. Surgeon has seen pt for follow up today as well, and writes that pt may begin diet advancement today as long as she continues to tolerate well.     3/4: Patient assessed today r/t NPO/CLD x 3 days. Visited patient today with patient's daughter present. Patient states last solid foods about 3-4 days ago. Prior to acute medical event, eating very well & not losing weight. She is interested in oral nutritional supplements to promote calorie/protein intakes until able to tolerate solid foods & sustain sufficient PO intakes.      Functional Status Uncertain of functional baseline, currently requiring assistance for ambulation. Is able to feed herself.      Food Allergies NKFA      Factors Affecting   Nutritional Intake Acute bowel perforation requiring sx intervention      Anthropometrics        Current Height, Weight Height: 160 cm (63\")  Weight: 69.7 kg (153 lb 10.6 oz) (03/06/21 0500)        Admit Height, Weight -    Flowsheet Rows      First Filed Value   Admission Height  160 cm (63\") Documented at 03/01/2021 1647   Admission Weight  67 kg (147 lb 11.3 oz) Documented at 03/01/2021 1647             Ideal Body Weight (IBW) 115 lbs    % Ideal Body Weight 133%        Usual Body Weight 145 lbs per pt    % Usual Body Weight 106%    Wt Change Observation 3/8: Weight remains stable since prior assessment   3/4: Gradual increase in weight since 2016    Weight Hx    Wt Readings from Last 30 Encounters:   03/06/21 0500 69.7 kg (153 lb 10.6 oz)   03/04/21 0413 69.5 kg (153 lb 3.5 oz) "   03/04/21 0105 68.5 kg (151 lb 0.2 oz)   03/01/21 2213 66.6 kg (146 lb 13.2 oz)   03/01/21 1647 67 kg (147 lb 11.3 oz)   04/24/19 1913 63.5 kg (140 lb)   03/04/17 1111 59 kg (130 lb)   11/19/16 1111 60.8 kg (134 lb)   05/31/16 1054 58.5 kg (129 lb)        BMI kg/m2 Body mass index is 27.22 kg/m².       Labs/Medications         Pertinent Labs Some episodes of hypoglycemia, likely related to ongoing poor intake; MD lundberg and pt has PRN orders available for D50 if needed    Results from last 7 days   Lab Units 03/08/21  0310 03/07/21  0340 03/06/21  0452 03/01/21  1743   SODIUM mmol/L 137 138 137 140   POTASSIUM mmol/L 3.6 3.4* 3.5 3.6   CHLORIDE mmol/L 102 104 101 106   CO2 mmol/L 20.0* 22.0 26.0 25.0   BUN mg/dL 14 11 5* 8   CREATININE mg/dL 0.69 0.65 0.60 0.68   CALCIUM mg/dL 7.8* 7.9* 8.5* 8.1*   BILIRUBIN mg/dL  --   --   --  0.3   ALK PHOS U/L  --   --   --  64   ALT (SGPT) U/L  --   --   --  11   AST (SGOT) U/L  --   --   --  20   GLUCOSE mg/dL 53* 82 93 83     Results from last 7 days   Lab Units 03/08/21  0310 03/02/21  0230   MAGNESIUM mg/dL  --  1.8   HEMOGLOBIN g/dL 10.5* 11.1*   HEMATOCRIT % 31.5* 33.5*     COVID19   Date Value Ref Range Status   03/01/2021 Not Detected Not Detected - Ref. Range Final     No results found for: HGBA1C      Pertinent Medications Reglan, protonix, zosyn, scopalamine    NS @ 100mL/hour      Physical Findings        Overall Physical   Appearance, MSA 3/8: Still appears well-nourished on observation  3/4: Patient visually appears well nourished.    --  Edema  None documented      Gastrointestinal BM today 3/8 (small)     Tubes No feeding tubes      Oral/Mouth Cavity Patient denies issues chewing or swallowing      Skin Sx abdominal incision      --  Current Nutrition Orders & Evaluation of Intake       Oral Nutrition     Current PO Diet Diet Liquids; Clear Liquid   Supplement None    PO Evaluation     % PO Intake 3/4: Intakes reflect CLD and not significant   3/8: Still with  "poor intake overall - has felt lots of \"fullness\" preventing good PO intake of liquids; doing better today and may be able to advance; will continue to monitor    --  Clinical Course    Nutrition Course Details 3/1 NPO; then 3/2 started clear liquids; 3/5 briefly on full liquids, but did not tolerate; returned to clear liquids 3/6 - continues on clear liquids today 3/8     Nutritional Risk Screening        NRS-2002 Score   -       Nutrition Diagnosis         Nutrition Dx Problem 1 Inadequate oral intakes r/t bowel perforation requiring right colon resection AEB NPO/CLD x 3 days.       Nutrition Dx Problem 2 -       Intervention Goal         Intervention Goal(s) Diet to advance with diet tolerance       Nutrition Intervention        RD/Tech Action Will continue to follow closely, to determine if nutrition support is indicated      Nutrition Prescription          Diet Prescription CLD    Supplement Prescription None ordered    --  Monitor/Evaluation        Monitor Weights, intakes, labs, BM and skin      Electronically signed by:  Jaimee Yun RD  03/08/21 15:21 EST  "

## 2021-03-08 NOTE — NURSING NOTE
Patients blood sugar was 53 with lab draw. Rechecked on unit and it was 47. Gave patient apple juice and sprite. Patient unable to tolerate full cup. Rechecked on unit and it was 40. D50 ordered per standing order and given. Blood sugar 178 on recheck after D50. Will continue to monitor.

## 2021-03-08 NOTE — PROGRESS NOTES
LOS: 5 days   Patient Care Team:  Didi Talbot MD as PCP - General  Didi Talbot MD as PCP - Family Medicine    Reason for follow-up: Postop    Subjective   Patient seen and examined.  Events of the weekend reviewed.  NG has been removed.  Had 5 bowel movements today.    Objective   Abdomen is soft.  Drain tube with appropriate color and output.  Incision clean dry and intact without infection.    Vital Signs  Vitals:    03/07/21 1232 03/07/21 2133 03/08/21 0502 03/08/21 1132   BP: 169/70 146/68 159/70 168/74   BP Location: Left arm Left arm Left arm Left arm   Patient Position: Lying Lying Lying Lying   Pulse: 74 74 88 103   Resp: 14 16 16 16   Temp: 98.1 °F (36.7 °C) 98.7 °F (37.1 °C) 98.4 °F (36.9 °C) 98.5 °F (36.9 °C)   TempSrc: Oral Oral Oral Oral   SpO2: 96% 93% 97% 99%   Weight:       Height:             Results Review:       Lab Results (last 24 hours)     Procedure Component Value Units Date/Time    POC Glucose Once [[id number]]  (Abnormal) Collected: 03/08/21 0500    Specimen: Blood Updated: 03/08/21 0501     Glucose 178 mg/dL      Comment: Serial Number: [id number]:  [id number]       POC Glucose Once [[id number]]  (Abnormal) Collected: 03/08/21 0440    Specimen: Blood Updated: 03/08/21 0441     Glucose 40 mg/dL      Comment: Serial Number: [id number]:  [id number]       POC Glucose Once [[id number]]  (Abnormal) Collected: 03/08/21 0426    Specimen: Blood Updated: 03/08/21 0427     Glucose 47 mg/dL      Comment: Serial Number: [id number]:  [id number]       Basic Metabolic Panel [[id number]]  (Abnormal) Collected: 03/08/21 0310    Specimen: Blood Updated: 03/08/21 0424     Glucose 53 mg/dL      BUN 14 mg/dL      Creatinine 0.69 mg/dL      Sodium 137 mmol/L      Potassium 3.6 mmol/L      Comment: Slight hemolysis detected by analyzer. Results may be affected.        Chloride 102 mmol/L      CO2 20.0 mmol/L      Calcium 7.8 mg/dL      eGFR Non African Amer 85 mL/min/1.73       BUN/Creatinine Ratio 20.3     Anion Gap 15.0 mmol/L     Narrative:      GFR Normal >60  Chronic Kidney Disease <60  Kidney Failure <15      CBC & Differential [[id number]]  (Abnormal) Collected: 03/08/21 0310    Specimen: Blood Updated: 03/08/21 0353    Narrative:      The following orders were created for panel order CBC & Differential.  Procedure                               Abnormality         Status                     ---------                               -----------         ------                     CBC Auto Differential[[id number]]        Abnormal            Final result                 Please view results for these tests on the individual orders.    CBC Auto Differential [[id number]]  (Abnormal) Collected: 03/08/21 0310    Specimen: Blood Updated: 03/08/21 0353     WBC 7.20 10*3/mm3      RBC 3.37 10*6/mm3      Hemoglobin 10.5 g/dL      Hematocrit 31.5 %      MCV 93.3 fL      MCH 31.2 pg      MCHC 33.5 g/dL      RDW 14.7 %      RDW-SD 47.7 fl      MPV 6.9 fL      Platelets 355 10*3/mm3      Neutrophil % 72.8 %      Lymphocyte % 11.2 %      Monocyte % 12.0 %      Eosinophil % 3.1 %      Basophil % 0.9 %      Neutrophils, Absolute 5.30 10*3/mm3      Lymphocytes, Absolute 0.80 10*3/mm3      Monocytes, Absolute 0.90 10*3/mm3      Eosinophils, Absolute 0.20 10*3/mm3      Basophils, Absolute 0.10 10*3/mm3      nRBC 0.0 /100 WBC            Imaging Results (Last 24 Hours)     ** No results found for the last 24 hours. **          Medication Review:     Assessment/Plan         Generalized abdominal pain    Impression: Postop day 6 open right hemicolectomy for perforated ascending colon from polypectomy with right colon resection and anastomosis.  Postop ileus resolved    Plan: Clear liquids today, increase as she tolerates        Pedro Wren DO  03/08/21  12:32 EST

## 2021-03-09 LAB
ANION GAP SERPL CALCULATED.3IONS-SCNC: 10 MMOL/L (ref 5–15)
BASOPHILS # BLD AUTO: 0 10*3/MM3 (ref 0–0.2)
BASOPHILS NFR BLD AUTO: 0.3 % (ref 0–1.5)
BUN SERPL-MCNC: 5 MG/DL (ref 8–23)
BUN/CREAT SERPL: 8.2 (ref 7–25)
CALCIUM SPEC-SCNC: 8.4 MG/DL (ref 8.6–10.5)
CHLORIDE SERPL-SCNC: 105 MMOL/L (ref 98–107)
CO2 SERPL-SCNC: 23 MMOL/L (ref 22–29)
CREAT SERPL-MCNC: 0.61 MG/DL (ref 0.57–1)
DEPRECATED RDW RBC AUTO: 50.8 FL (ref 37–54)
EOSINOPHIL # BLD AUTO: 0.2 10*3/MM3 (ref 0–0.4)
EOSINOPHIL NFR BLD AUTO: 4 % (ref 0.3–6.2)
ERYTHROCYTE [DISTWIDTH] IN BLOOD BY AUTOMATED COUNT: 15.1 % (ref 12.3–15.4)
GFR SERPL CREATININE-BSD FRML MDRD: 98 ML/MIN/1.73
GLUCOSE BLDC GLUCOMTR-MCNC: 91 MG/DL (ref 70–105)
GLUCOSE SERPL-MCNC: 85 MG/DL (ref 65–99)
HCT VFR BLD AUTO: 28.8 % (ref 34–46.6)
HGB BLD-MCNC: 9.8 G/DL (ref 12–15.9)
LYMPHOCYTES # BLD AUTO: 1 10*3/MM3 (ref 0.7–3.1)
LYMPHOCYTES NFR BLD AUTO: 16.3 % (ref 19.6–45.3)
MCH RBC QN AUTO: 32 PG (ref 26.6–33)
MCHC RBC AUTO-ENTMCNC: 34.2 G/DL (ref 31.5–35.7)
MCV RBC AUTO: 93.5 FL (ref 79–97)
MONOCYTES # BLD AUTO: 0.8 10*3/MM3 (ref 0.1–0.9)
MONOCYTES NFR BLD AUTO: 13.1 % (ref 5–12)
NEUTROPHILS NFR BLD AUTO: 4.1 10*3/MM3 (ref 1.7–7)
NEUTROPHILS NFR BLD AUTO: 66.3 % (ref 42.7–76)
NRBC BLD AUTO-RTO: 0.1 /100 WBC (ref 0–0.2)
PLATELET # BLD AUTO: 405 10*3/MM3 (ref 140–450)
PMV BLD AUTO: 6.4 FL (ref 6–12)
POTASSIUM SERPL-SCNC: 3.7 MMOL/L (ref 3.5–5.2)
RBC # BLD AUTO: 3.08 10*6/MM3 (ref 3.77–5.28)
SODIUM SERPL-SCNC: 138 MMOL/L (ref 136–145)
WBC # BLD AUTO: 6.2 10*3/MM3 (ref 3.4–10.8)

## 2021-03-09 PROCEDURE — 82962 GLUCOSE BLOOD TEST: CPT

## 2021-03-09 PROCEDURE — 85025 COMPLETE CBC W/AUTO DIFF WBC: CPT | Performed by: SURGERY

## 2021-03-09 PROCEDURE — 25010000002 PIPERACILLIN SOD-TAZOBACTAM PER 1 G: Performed by: FAMILY MEDICINE

## 2021-03-09 PROCEDURE — 25010000002 METOCLOPRAMIDE PER 10 MG: Performed by: SURGERY

## 2021-03-09 PROCEDURE — 80048 BASIC METABOLIC PNL TOTAL CA: CPT | Performed by: FAMILY MEDICINE

## 2021-03-09 RX ORDER — METOCLOPRAMIDE 10 MG/1
10 TABLET ORAL EVERY 6 HOURS SCHEDULED
Status: DISCONTINUED | OUTPATIENT
Start: 2021-03-09 | End: 2021-03-10 | Stop reason: HOSPADM

## 2021-03-09 RX ORDER — IBUPROFEN 400 MG/1
200 TABLET ORAL ONCE
Status: COMPLETED | OUTPATIENT
Start: 2021-03-09 | End: 2021-03-09

## 2021-03-09 RX ORDER — IBUPROFEN 400 MG/1
400 TABLET ORAL EVERY 6 HOURS PRN
Status: DISCONTINUED | OUTPATIENT
Start: 2021-03-09 | End: 2021-03-10 | Stop reason: HOSPADM

## 2021-03-09 RX ORDER — HYDROCODONE BITARTRATE AND ACETAMINOPHEN 5; 325 MG/1; MG/1
1 TABLET ORAL EVERY 4 HOURS PRN
Status: DISCONTINUED | OUTPATIENT
Start: 2021-03-09 | End: 2021-03-10 | Stop reason: HOSPADM

## 2021-03-09 RX ORDER — ACETAMINOPHEN 325 MG/1
650 TABLET ORAL EVERY 6 HOURS PRN
Status: DISCONTINUED | OUTPATIENT
Start: 2021-03-09 | End: 2021-03-10 | Stop reason: HOSPADM

## 2021-03-09 RX ADMIN — HYDROCODONE BITARTRATE AND ACETAMINOPHEN 1 TABLET: 5; 325 TABLET ORAL at 20:53

## 2021-03-09 RX ADMIN — METOCLOPRAMIDE 10 MG: 10 TABLET ORAL at 19:26

## 2021-03-09 RX ADMIN — METOCLOPRAMIDE HYDROCHLORIDE 10 MG: 5 INJECTION INTRAMUSCULAR; INTRAVENOUS at 02:29

## 2021-03-09 RX ADMIN — METOCLOPRAMIDE HYDROCHLORIDE 10 MG: 5 INJECTION INTRAMUSCULAR; INTRAVENOUS at 08:24

## 2021-03-09 RX ADMIN — IBUPROFEN 200 MG: 400 TABLET, FILM COATED ORAL at 08:24

## 2021-03-09 RX ADMIN — PHENOL 2 SPRAY: 1.4 SPRAY ORAL at 20:53

## 2021-03-09 RX ADMIN — PIPERACILLIN AND TAZOBACTAM 3.38 G: 3; .375 INJECTION, POWDER, LYOPHILIZED, FOR SOLUTION INTRAVENOUS at 18:01

## 2021-03-09 RX ADMIN — PIPERACILLIN AND TAZOBACTAM 3.38 G: 3; .375 INJECTION, POWDER, LYOPHILIZED, FOR SOLUTION INTRAVENOUS at 10:01

## 2021-03-09 RX ADMIN — PANTOPRAZOLE SODIUM 40 MG: 40 TABLET, DELAYED RELEASE ORAL at 06:03

## 2021-03-09 RX ADMIN — SCOPALAMINE 1 PATCH: 1 PATCH, EXTENDED RELEASE TRANSDERMAL at 14:14

## 2021-03-09 RX ADMIN — METOCLOPRAMIDE 10 MG: 10 TABLET ORAL at 14:14

## 2021-03-09 RX ADMIN — PIPERACILLIN AND TAZOBACTAM 3.38 G: 3; .375 INJECTION, POWDER, LYOPHILIZED, FOR SOLUTION INTRAVENOUS at 02:28

## 2021-03-09 NOTE — PROGRESS NOTES
Continued Stay Note  [facility]     Patient Name: Mariajose Tabor  MRN: [id number]  Today's Date: 3/9/2021    Admit Date: 3/1/2021    Discharge Plan     Row Name 03/09/21 0844       Plan    Plan  Routine discharge home with spouse    Patient/Family in Agreement with Plan  yes    Plan Comments  Barriers to discharge: POD#7 Bowel resection from perforation. Patient with a postop ileus transitioning to oral antibiotics. Anticipate discharge tomorrow.          Expected Discharge Date and Time     Expected Discharge Date Expected Discharge Time    Mar 10, 2021               Anna Naegele RN Case Manager  [address]  [address]   [phone]  office  [phone]  fax  [email or web address]

## 2021-03-09 NOTE — PROGRESS NOTES
LOS: 6 days   Patient Care Team:  Didi Talbot MD as PCP - General  Didi Talbot MD as PCP - Family Medicine    Subjective:  Looks and feels much better    Objective:   Tolerating advancement of p.o.//afebrile      Review of Systems:   Review of Systems   Constitutional: Positive for activity change and appetite change.   Respiratory: Negative.    Cardiovascular: Negative.    Gastrointestinal: Positive for abdominal pain.   Genitourinary: Negative.    Musculoskeletal: Negative.        Vital Signs  Temp:  [98.1 °F (36.7 °C)-98.6 °F (37 °C)] 98.6 °F (37 °C)  Heart Rate:  [] 72  Resp:  [14-16] 14  BP: (144-168)/(74-79) 144/79    Physical Exam:  Physical Exam  Vitals reviewed.   Constitutional:       Appearance: Normal appearance.   Cardiovascular:      Rate and Rhythm: Normal rate.   Pulmonary:      Effort: Pulmonary effort is normal.   Abdominal:      Palpations: Abdomen is soft.      Tenderness: There is abdominal tenderness. There is no guarding or rebound.   Neurological:      General: No focal deficit present.      Mental Status: She is alert and oriented to person, place, and time.          Radiology:  CT Abdomen Pelvis With Contrast    Result Date: 3/1/2021  1. There is a small amount of free air, compatible with bowel perforation (unless there has been recent surgery.) There is also a small/moderate amount of free fluid. This was discussed with Sarah Goodrich. 2. There is sigmoid diverticulosis. This makes it difficult to exclude a small amount of extraluminal gas around the sigmoid colon in the pelvis, although there is no one focal area of suspicion. 3. The thickened appearance of the wall of the gastric antrum may be due to gastritis or contraction and clinical correlation is recommended..  Electronically Signed By-Caridad Dover MD On:3/1/2021 7:43 PM This report was finalized on [id number] by  Caridad Dover MD.    XR Abdomen KUB    Result Date: 3/6/2021  Evidence of recent surgery with  mild gaseous distention of the small bowel, which could reflect ileus, less likely obstruction.  Electronically Signed By-Gadiel Cruz MD On:3/6/2021 2:19 PM This report was finalized on [id number] by  Gadiel Cruz MD.         Results Review:     I reviewed the patient's new clinical results.  I reviewed the patient's new imaging results and agree with the interpretation.    Medication Review:   Scheduled Meds:ibuprofen, 200 mg, Oral, Once  metoclopramide, 10 mg, Intravenous, Q6H  pantoprazole, 40 mg, Oral, Q AM  piperacillin-tazobactam, 3.375 g, Intravenous, Q8H  Scopolamine, 1 patch, Transdermal, Q72H  sodium chloride, 3 mL, Intravenous, Q12H      Continuous Infusions:   PRN Meds:.•  acetaminophen  •  dextrose  •  dextrose  •  glucagon (human recombinant)  •  HYDROcodone-acetaminophen  •  HYDROmorphone  •  HYDROmorphone **AND** naloxone  •  magnesium sulfate **OR** magnesium sulfate **OR** magnesium sulfate  •  Morphine **AND** naloxone  •  nitroglycerin  •  ondansetron **OR** ondansetron  •  oxyCODONE  •  phenol  •  potassium chloride **OR** potassium chloride **OR** potassium chloride  •  potassium chloride  •  promethazine **OR** promethazine  •  [COMPLETED] Insert peripheral IV **AND** sodium chloride  •  sodium chloride    Labs:    CBC    Results from last 7 days   Lab Units 03/09/21  0250 03/08/21  0310 03/07/21  0340 03/06/21  0452 03/05/21  0313 03/04/21  0401 03/03/21  0209   WBC 10*3/mm3 6.20 7.20 7.50 6.30 6.00 6.70 10.90*   HEMOGLOBIN g/dL 9.8* 10.5* 10.6* 11.2* 9.9* 10.1* 11.3*   PLATELETS 10*3/mm3 405 355 348 320 265 257 302     BMP   Results from last 7 days   Lab Units 03/09/21  0250 03/08/21  2146 03/08/21  0310 03/07/21  0340 03/06/21  0452 03/05/21  0313 03/04/21  0401 03/03/21  0209   SODIUM mmol/L 138  --  137 138 137 137 133* 136   POTASSIUM mmol/L 3.7 3.7 3.6 3.4* 3.5 3.5 3.7 4.1   CHLORIDE mmol/L 105  --  102 104 101 102 100 103   CO2 mmol/L 23.0  --  20.0* 22.0 26.0 27.0 26.0 23.0    BUN mg/dL 5*  --  14 11 5* 7* 11 11   CREATININE mg/dL 0.61  --  0.69 0.65 0.60 0.58 0.64 0.63   GLUCOSE mg/dL 85  --  53* 82 93 91 102* 99     Cr Clearance Estimated Creatinine Clearance: 63 mL/min (by C-G formula based on SCr of 0.61 mg/dL).  Coag     HbA1C No results found for: HGBA1C  Blood Glucose   Glucose   Date/Time Value Ref Range Status   03/09/2021 0227 91 70 - 105 mg/dL Final     Comment:     Serial Number: [id number]:  [id number]   03/08/2021 0500 178 (H) 70 - 105 mg/dL Final     Comment:     Serial Number: [id number]:  [id number]   03/08/2021 0440 40 (L) 70 - 105 mg/dL Final     Comment:     Serial Number: [id number]:  [id number]   03/08/2021 0426 47 (L) 70 - 105 mg/dL Final     Comment:     Serial Number: [id number]:  [id number]     Infection   Results from last 7 days   Lab Units 03/02/21  0723 03/02/21  0722   BLOODCX  No growth at 5 days No growth at 5 days     CMP   Results from last 7 days   Lab Units 03/09/21  0250 03/08/21  2146 03/08/21  0310 03/07/21  0340 03/06/21  0452 03/05/21  0313 03/04/21  0401 03/03/21  0209   SODIUM mmol/L 138  --  137 138 137 137 133* 136   POTASSIUM mmol/L 3.7 3.7 3.6 3.4* 3.5 3.5 3.7 4.1   CHLORIDE mmol/L 105  --  102 104 101 102 100 103   CO2 mmol/L 23.0  --  20.0* 22.0 26.0 27.0 26.0 23.0   BUN mg/dL 5*  --  14 11 5* 7* 11 11   CREATININE mg/dL 0.61  --  0.69 0.65 0.60 0.58 0.64 0.63   GLUCOSE mg/dL 85  --  53* 82 93 91 102* 99     UA      Radiology(recent) No radiology results for the last day   Assessment:    Acute abdominal pain secondary to acute bowel perforation related to polypectomy as below  Status post elective exploratory laparotomy with right colon resection 3/2/2021  Acute reactive hypoglycemia  Acute pneumoperitoneum  Status post elective colonoscopy with polypectomy of a 3.5 cm polyp 3/1/2021  Postoperative anemia secondary to acute losses and distributive phenomenon  Primary essential hypertension  Gastroesophageal reflux  disease without esophagitis  Perennial allergic rhinitis  Degenerative disc disease cervical spine  Raynaud's disease  History of mixed migraine headaches    Plan:  Postoperative pathway//will transition to enteral antimicrobial therapy in the morning anticipate discharge home likely in the morning        Ke Talbot MD  03/09/21  06:33 EST

## 2021-03-09 NOTE — PLAN OF CARE
Goal Outcome Evaluation:  Plan of Care Reviewed With: patient  Progress: improving     Patient complaining of pain, treated per MAR. Patient ambulating independently. Patient not complaining of nausea during shift. Patient report several bowel movements yesterday. Patient hesitant to fully drink or eat. Staff education on nutritional needs. VSS.

## 2021-03-09 NOTE — PLAN OF CARE
Goal Outcome Evaluation:        Outcome Summary: Pt tolerating full liquids, pt denies n/v, ambulating independently in room and around unit. VSS. Plan ongoing.

## 2021-03-09 NOTE — PROGRESS NOTES
LOS: 6 days   Patient Care Team:  Didi Talbot MD as PCP - General  Didi Talbot MD as PCP - Family Medicine    Reason for follow-up: Postop    Subjective   Patient seen and examined.  Feeling much better.  Tolerating full liquids.  Bowels are moving.    Objective   Abdomen is soft.  Incisions clean dry and intact.  Drain tube with appropriate color and output.    Vital Signs  Vitals:    03/08/21 1132 03/08/21 1900 03/09/21 0300 03/09/21 1100   BP: 168/74 158/79 144/79 153/78   BP Location: Left arm Right arm Right arm Right arm   Patient Position: Lying Lying Lying Sitting   Pulse: 103 78 72 80   Resp: 16 14 14 15   Temp: 98.5 °F (36.9 °C) 98.1 °F (36.7 °C) 98.6 °F (37 °C) 98.2 °F (36.8 °C)   TempSrc: Oral Oral Oral Oral   SpO2: 99% 97% 96% 99%   Weight:       Height:             Results Review:       Lab Results (last 24 hours)     Procedure Component Value Units Date/Time    Basic Metabolic Panel [[id number]]  (Abnormal) Collected: 03/09/21 0250    Specimen: Blood Updated: 03/09/21 0356     Glucose 85 mg/dL      BUN 5 mg/dL      Creatinine 0.61 mg/dL      Sodium 138 mmol/L      Potassium 3.7 mmol/L      Chloride 105 mmol/L      CO2 23.0 mmol/L      Calcium 8.4 mg/dL      eGFR Non African Amer 98 mL/min/1.73      BUN/Creatinine Ratio 8.2     Anion Gap 10.0 mmol/L     Narrative:      GFR Normal >60  Chronic Kidney Disease <60  Kidney Failure <15      CBC & Differential [[id number]]  (Abnormal) Collected: 03/09/21 0250    Specimen: Blood Updated: 03/09/21 0326    Narrative:      The following orders were created for panel order CBC & Differential.  Procedure                               Abnormality         Status                     ---------                               -----------         ------                     CBC Auto Differential[[id number]]        Abnormal            Final result                 Please view results for these tests on the individual orders.    CBC Auto Differential [[id number]]   (Abnormal) Collected: 03/09/21 0250    Specimen: Blood Updated: 03/09/21 0326     WBC 6.20 10*3/mm3      RBC 3.08 10*6/mm3      Hemoglobin 9.8 g/dL      Hematocrit 28.8 %      MCV 93.5 fL      MCH 32.0 pg      MCHC 34.2 g/dL      RDW 15.1 %      RDW-SD 50.8 fl      MPV 6.4 fL      Platelets 405 10*3/mm3      Neutrophil % 66.3 %      Lymphocyte % 16.3 %      Monocyte % 13.1 %      Eosinophil % 4.0 %      Basophil % 0.3 %      Neutrophils, Absolute 4.10 10*3/mm3      Lymphocytes, Absolute 1.00 10*3/mm3      Monocytes, Absolute 0.80 10*3/mm3      Eosinophils, Absolute 0.20 10*3/mm3      Basophils, Absolute 0.00 10*3/mm3      nRBC 0.1 /100 WBC     POC Glucose Once [[id number]]  (Normal) Collected: 03/09/21 0227    Specimen: Blood Updated: 03/09/21 0228     Glucose 91 mg/dL      Comment: Serial Number: [id number]:  [id number]       Potassium [[id number]]  (Normal) Collected: 03/08/21 2146    Specimen: Blood Updated: 03/08/21 2226     Potassium 3.7 mmol/L            Imaging Results (Last 24 Hours)     ** No results found for the last 24 hours. **          Medication Review:     Assessment/Plan         Generalized abdominal pain      Impression: Postop day 7 right colon resection for perforated ascending colon    Plan: Drain tube can come out.  Low residue diet.  May shower.  Anticipate discharge in a.m.  Recheck in office in 2 weeks        Pedro Wren DO  03/09/21  16:13 EST

## 2021-03-10 VITALS
RESPIRATION RATE: 15 BRPM | TEMPERATURE: 98.2 F | HEART RATE: 77 BPM | BODY MASS INDEX: 27.23 KG/M2 | OXYGEN SATURATION: 95 % | HEIGHT: 63 IN | SYSTOLIC BLOOD PRESSURE: 139 MMHG | WEIGHT: 153.66 LBS | DIASTOLIC BLOOD PRESSURE: 73 MMHG

## 2021-03-10 LAB
ANION GAP SERPL CALCULATED.3IONS-SCNC: 9 MMOL/L (ref 5–15)
BASOPHILS # BLD AUTO: 0 10*3/MM3 (ref 0–0.2)
BASOPHILS NFR BLD AUTO: 0.7 % (ref 0–1.5)
BUN SERPL-MCNC: 4 MG/DL (ref 8–23)
BUN/CREAT SERPL: 6.7 (ref 7–25)
CALCIUM SPEC-SCNC: 8.4 MG/DL (ref 8.6–10.5)
CHLORIDE SERPL-SCNC: 105 MMOL/L (ref 98–107)
CO2 SERPL-SCNC: 25 MMOL/L (ref 22–29)
CREAT SERPL-MCNC: 0.6 MG/DL (ref 0.57–1)
DEPRECATED RDW RBC AUTO: 45.9 FL (ref 37–54)
EOSINOPHIL # BLD AUTO: 0.2 10*3/MM3 (ref 0–0.4)
EOSINOPHIL NFR BLD AUTO: 4.3 % (ref 0.3–6.2)
ERYTHROCYTE [DISTWIDTH] IN BLOOD BY AUTOMATED COUNT: 14.6 % (ref 12.3–15.4)
GFR SERPL CREATININE-BSD FRML MDRD: 99 ML/MIN/1.73
GLUCOSE SERPL-MCNC: 98 MG/DL (ref 65–99)
HCT VFR BLD AUTO: 29.3 % (ref 34–46.6)
HGB BLD-MCNC: 9.9 G/DL (ref 12–15.9)
LYMPHOCYTES # BLD AUTO: 1.3 10*3/MM3 (ref 0.7–3.1)
LYMPHOCYTES NFR BLD AUTO: 24.6 % (ref 19.6–45.3)
MCH RBC QN AUTO: 30.6 PG (ref 26.6–33)
MCHC RBC AUTO-ENTMCNC: 33.7 G/DL (ref 31.5–35.7)
MCV RBC AUTO: 90.9 FL (ref 79–97)
MONOCYTES # BLD AUTO: 0.7 10*3/MM3 (ref 0.1–0.9)
MONOCYTES NFR BLD AUTO: 12 % (ref 5–12)
NEUTROPHILS NFR BLD AUTO: 3.2 10*3/MM3 (ref 1.7–7)
NEUTROPHILS NFR BLD AUTO: 58.4 % (ref 42.7–76)
NRBC BLD AUTO-RTO: 0.1 /100 WBC (ref 0–0.2)
PLATELET # BLD AUTO: 441 10*3/MM3 (ref 140–450)
PMV BLD AUTO: 6.5 FL (ref 6–12)
POTASSIUM SERPL-SCNC: 3.5 MMOL/L (ref 3.5–5.2)
RBC # BLD AUTO: 3.22 10*6/MM3 (ref 3.77–5.28)
SODIUM SERPL-SCNC: 139 MMOL/L (ref 136–145)
WBC # BLD AUTO: 5.4 10*3/MM3 (ref 3.4–10.8)

## 2021-03-10 PROCEDURE — 80048 BASIC METABOLIC PNL TOTAL CA: CPT | Performed by: SURGERY

## 2021-03-10 PROCEDURE — 85025 COMPLETE CBC W/AUTO DIFF WBC: CPT | Performed by: SURGERY

## 2021-03-10 RX ORDER — HYDROCODONE BITARTRATE AND ACETAMINOPHEN 5; 325 MG/1; MG/1
1 TABLET ORAL EVERY 4 HOURS PRN
Qty: 15 TABLET | Refills: 0 | Status: SHIPPED | OUTPATIENT
Start: 2021-03-10 | End: 2021-03-16

## 2021-03-10 RX ADMIN — METOCLOPRAMIDE 10 MG: 10 TABLET ORAL at 07:56

## 2021-03-10 RX ADMIN — METOCLOPRAMIDE 10 MG: 10 TABLET ORAL at 01:00

## 2021-03-10 RX ADMIN — POTASSIUM CHLORIDE 40 MEQ: 1500 TABLET, EXTENDED RELEASE ORAL at 05:52

## 2021-03-10 RX ADMIN — PANTOPRAZOLE SODIUM 40 MG: 40 TABLET, DELAYED RELEASE ORAL at 05:52

## 2021-03-10 NOTE — DISCHARGE SUMMARY
Date of Discharge:  3/10/2021    Discharge Diagnosis:     Acute abdominal pain secondary to acute bowel perforation related to polypectomy as below  Status post elective exploratory laparotomy with right colon resection 3/2/2021  Acute reactive hypoglycemia  Acute pneumoperitoneum  Status post elective colonoscopy with polypectomy of a 3.5 cm polyp 3/1/2021  Postoperative anemia secondary to acute losses and distributive phenomenon  Primary essential hypertension  Gastroesophageal reflux disease without esophagitis  Perennial allergic rhinitis  Degenerative disc disease cervical spine  Raynaud's disease  History of mixed migraine headaches         Presenting Problem/History of Present Illness  Active Hospital Problems    Diagnosis  POA   • Generalized abdominal pain [R10.84]  Yes      Resolved Hospital Problems   No resolved problems to display.          Hospital Course  Patient is a 70 y.o. female presented with acute abdominal pain with an acute perforation after elective colonoscopy with polypectomy.  The patient was admitted and was evaluated by general surgery and went to the operating room where he had an exploratory laparotomy with right colon resection was performed.  The patient had a typical postoperative course and did well overall.  She was deemed stable for discharge home today with medications per the discharge reconciliation.  She will hold her meloxicam and aspirin at the time of discharge and begin oral hydrocodone for pain.  She will be encouraged to begin normal activities with the exception of squatting bending and lifting.  She will follow-up with us in the office next week either in person or via virtual visit and will follow-up with general surgery in the office within 2 to 4 weeks time.  She will call with any questions or concerns and be sure to call if she has any return of abdominal pain nausea fevers or other constitutional complaint..      Procedures  Performed    Procedure(s):  LAPAROTOMY EXPLORATORY with RIGHT COLON RESECTION  -------------------       Consults:   Consults     Date and Time Order Name Status Description    3/1/2021 10:17 PM Inpatient General Surgery Consult      3/1/2021  7:51 PM Family Medicine Consult Completed     3/1/2021  7:43 PM Surgery (on-call MD unless specified) Completed           Pertinent Test Results:XR Abdomen KUB    Result Date: 3/6/2021  Evidence of recent surgery with mild gaseous distention of the small bowel, which could reflect ileus, less likely obstruction.  Electronically Signed By-Gadiel Cruz MD On:3/6/2021 2:19 PM This report was finalized on [id number] by  Gadiel Cruz MD.      Imaging Results (Last 7 Days)     Procedure Component Value Units Date/Time    XR Abdomen KUB [[id number]] Collected: 03/06/21 1419     Updated: 03/06/21 1439    Narrative:      Examination: XR ABDOMEN KUB-     Date of Exam: 3/6/2021 2:03 PM     Indication: post operative ileus; R10.84-Generalized abdominal pain;  K63.1-Perforation of intestine (nontraumatic); K66.8-Other specified  disorders of peritoneum.     Comparison: None available.     Technique: Single AP view of the abdomen was obtained radiographically.     Findings:  There are mildly distended loops of small bowel in the central abdomen.  There is evidence of recent laparotomy with skin staples in the midline  lower abdomen. No pneumatosis intestinalis or evidence of  pneumoperitoneum. No pathologic abdominal calcifications. Regional bones  appear intact. No evidence of organomegaly or large volume ascites.       Impression:      Evidence of recent surgery with mild gaseous distention of the small  bowel, which could reflect ileus, less likely obstruction.      Electronically Signed By-Gadiel Cruz MD On:3/6/2021 2:19 PM  This report was finalized on [id number] by  Gadiel Cruz MD.              Condition on Discharge:  Fair    Vital Signs  Temp:  [98.2 °F (36.8  °C)-98.3 °F (36.8 °C)] 98.2 °F (36.8 °C)  Heart Rate:  [77-86] 77  Resp:  [15-17] 15  BP: (129-153)/(71-78) 139/73    Physical Exam:     General Appearance:    Alert, cooperative, in no acute distress   Head:    Normocephalic, without obvious abnormality, atraumatic   Eyes:           Conjunctivae and sclerae normal, no   icterus, no pallor, corneas clear, PERRLA   Throat:   No oral lesions, no thrush, oral mucosa moist   Neck:   No adenopathy, supple, trachea midline, no thyromegaly, no   carotid bruit, no JVD   Lungs:     Clear to auscultation,respirations regular, even and                  unlabored    Heart:    Regular rhythm and normal rate, normal S1 and S2, no            murmur, no gallop, no rub, no click   Chest Wall:    No abnormalities observed   Abdomen:     Normal bowel sounds, no masses, no organomegaly, soft        non-tender, non-distended, no guarding, no rebound                tenderness   Rectal:     Deferred   Extremities:   Moves all extremities well, no edema, no cyanosis, no             redness   Pulses:   Pulses palpable and equal bilaterally   Skin:   No bleeding, bruising or rash   Lymph nodes:   No palpable adenopathy   Neurologic:   Cranial nerves 2 - 12 grossly intact, sensation intact, DTR       present and equal bilaterally         Discharge Disposition  Home or Self Care    Discharge Medications     Discharge Medications      New Medications      Instructions Start Date   HYDROcodone-acetaminophen 5-325 MG per tablet  Commonly known as: NORCO   1 tablet, Oral, Every 4 Hours PRN         Changes to Medications      Instructions Start Date   PRESERVISION AREDS 2 PO  What changed: Another medication with the same name was removed. Continue taking this medication, and follow the directions you see here.   2 tablets, Oral, Daily         Continue These Medications      Instructions Start Date   amitriptyline 25 MG tablet  Commonly known as: ELAVIL   25 mg, Oral, Nightly      aspirin 81 MG EC  tablet   81 mg, Oral, Daily      benazepril 20 MG tablet  Commonly known as: LOTENSIN   20 mg, Oral, Daily      clobetasol 0.05 % cream  Commonly known as: TEMOVATE   Topical, Nightly, Scalp       meloxicam 15 MG tablet  Commonly known as: MOBIC   15 mg, Oral, Daily      NON FORMULARY / PATIENT SUPPLIED MEDICATION   2 mL, Vaginal, 2 Times Weekly, Estriol 0.5% cream-   Insert 2ml twice weekly on Tuesdays and Fridays       omeprazole 40 MG capsule  Commonly known as: priLOSEC   40 mg, Oral, Daily         Stop These Medications    Biotin 39487 MCG tablet     CALCIUM 1200+D3 PO     fish oil 1000 MG capsule capsule     fluticasone 50 MCG/ACT nasal spray  Commonly known as: FLONASE     Vitamin C 500 MG capsule            Discharge Diet:   [place]  Activity at Discharge:   As tolerated  Follow-up Appointments  As per the body of the discharge summary      Test Results Pending at Discharge       Ke Talbot MD  03/10/21  06:45 EST

## 2021-03-10 NOTE — PROGRESS NOTES
Nutrition Services    Patient Name: Mariajose Tabor  YOB: 1950  MRN: [id number]  Admission date: 3/1/2021    PPE Documentation        PPE Worn By Provider mask and eye protection   PPE Worn By Patient  Mask     PROGRESS NOTE      Encounter Information: 3/9: Pt seen for follow up today to check on diet advancement/diet tolerance. Pt now tolerating full liquids and has had several bowel movements. Pt hopeful she will continue to tolerate as she advances toward solids. Provided diet education on Low Fiber/Low Residue diet, and also provided printed recipe list for high kcal/high PRO beverages pt can use to help meet needs if still preferring liquids for some meals. All questions answered and pt appreciative.  Pt still appears well-nourished.        PO Diet: Diet Gastrointestinal; Low Residue   PO Supplements: None currently ordered   PO Intake:  Fair-good while on full liquids; will continue to follow        Nutrition support orders: -    Nutrition support review: -       Labs (reviewed below): Reviewed and C/W clinical course         GI Function:  Multiple BMs yesterday 3/8       Nutrition Intervention: Continue diet as tolerated; nutrition education provided today. As long as pt continues to eat/tolerate, diet will meet estimated needs.       Results from last 7 days   Lab Units 03/09/21  0250 03/08/21  2146 03/08/21  0310 03/07/21  0340   SODIUM mmol/L 138  --  137 138   POTASSIUM mmol/L 3.7 3.7 3.6 3.4*   CHLORIDE mmol/L 105  --  102 104   CO2 mmol/L 23.0  --  20.0* 22.0   BUN mg/dL 5*  --  14 11   CREATININE mg/dL 0.61  --  0.69 0.65   CALCIUM mg/dL 8.4*  --  7.8* 7.9*   GLUCOSE mg/dL 85  --  53* 82     Results from last 7 days   Lab Units 03/09/21  0250   HEMOGLOBIN g/dL 9.8*   HEMATOCRIT % 28.8*     COVID19   Date Value Ref Range Status   03/01/2021 Not Detected Not Detected - Ref. Range Final     No results found for: HGBA1C    RD to follow up per protocol.    Electronically signed by:  Jaimee  JUAN FRANCISCO Yun  03/09/21

## 2021-03-10 NOTE — PLAN OF CARE
Goal Outcome Evaluation:  Plan of Care Reviewed With: patient  Progress: improving  Patients pain controlled with PO pain medication per MAR. Patient ambulates independently. Patient tolerated dinner well. Patient had no reports of nausea so far during shift. Discharge anticipated for today. VSS.

## 2021-03-10 NOTE — NURSING NOTE
Lost iv access. Spoke with MD Abrahan. States iv abx or p.o. abx no longer needed. Discontinue zosyn. No new orders for antibiotics.

## 2021-03-11 ENCOUNTER — TELEPHONE (OUTPATIENT)
Dept: SURGERY | Facility: CLINIC | Age: 71
End: 2021-03-11

## 2021-03-11 ENCOUNTER — READMISSION MANAGEMENT (OUTPATIENT)
Dept: CALL CENTER | Facility: HOSPITAL | Age: 71
End: 2021-03-11

## 2021-03-11 NOTE — TELEPHONE ENCOUNTER
Patient called and left VM, what medications can she restart after leaving hospital. Surgery on LAPAROTOMY EXPLORATORY with RIGHT COLON RESECTION 3-2-21

## 2021-03-11 NOTE — PROGRESS NOTES
Case Management Discharge Note      Final Note: Routine discharge home with spouse         Selected Continued Care - Discharged on 3/10/2021 Admission date: 3/1/2021 - Discharge disposition: Home or Self Care                 Final Discharge Disposition Code: 01 - home or self-care

## 2021-03-11 NOTE — OUTREACH NOTE
Prep Survey      Responses   [facility] facility patient discharged from?  [facility]   Is LACE score < 7 ?  No   Emergency Room discharge w/ pulse ox?  No   Eligibility  Readm Mgmt   Discharge diagnosis  Acute abdominal pain secondary to acute bowel perforation related to polypectomy  [Status post elective exploratory laparotomy with right colon resection 3/2/2021]   Does the patient have one of the following disease processes/diagnoses(primary or secondary)?  General Surgery   Does the patient have Home health ordered?  No   Is there a DME ordered?  No   Comments regarding appointments  Per AVS   Medication alerts for this patient  Continue Aspirin.  Meds per AVS.   Prep survey completed?  Yes          Sabrina Burgos RN

## 2021-03-16 ENCOUNTER — READMISSION MANAGEMENT (OUTPATIENT)
Dept: CALL CENTER | Facility: HOSPITAL | Age: 71
End: 2021-03-16

## 2021-03-16 NOTE — OUTREACH NOTE
General Surgery Week 1 Survey      Responses   [facility] patient discharged from?  [facility]   Does the patient have one of the following disease processes/diagnoses(primary or secondary)?  General Surgery   Week 1 attempt successful?  Yes   Call start time  1739   Call end time  1742   Discharge diagnosis  Acute abdominal pain secondary to acute bowel perforation related to polypectomy    Is patient permission given to speak with other caregiver?  No   Medication alerts for this patient  Continue Aspirin.  Meds per AVS.   Meds reviewed with patient/caregiver?  Yes   Is the patient having any side effects they believe may be caused by any medication additions or changes?  No   Does the patient have all medications related to this admission filled (includes all antibiotics, pain medications, etc.)  Yes   Is the patient taking all medications as directed (includes completed medication regime)?  Yes   Does the patient have a follow up appointment scheduled with their surgeon?  Yes   Has the patient kept scheduled appointments due by today?  N/A   Comments  She has an appt this week.    Has home health visited the patient within 72 hours of discharge?  N/A   Psychosocial issues?  No   Did the patient receive a copy of their discharge instructions?  Yes   Nursing interventions  Reviewed instructions with patient   What is the patient's perception of their health status since discharge?  Improving   Is the patient /caregiver able to teach back basic post-op care?  Continue use of incentive spirometry at least 1 week post discharge, Practice 'cough and deep breath', No tub bath, swimming, or hot tub until instructed by MD, Do not remove steri-strips, Lifting as instructed by MD in discharge instructions   Is the patient/caregiver able to teach back signs and symptoms of incisional infection?  Increased redness, swelling or pain at the incisonal site, Increased drainage or bleeding, Pus or odor from incision, Fever   Is  the patient/caregiver able to teach back steps to recovery at home?  Set small, achievable goals for return to baseline health, Weigh daily   If the patient is a current smoker, are they able to teach back resources for cessation?  Not a smoker   Is the patient/caregiver able to teach back the hierarchy of who to call/visit for symptoms/problems? PCP, Specialist, Home health nurse, Urgent Care, ED, 911  Yes   Additional teach back comments  She states she is doing well.    Week 1 call completed?  Yes          Natali Lam RN

## 2021-03-22 ENCOUNTER — OFFICE VISIT (OUTPATIENT)
Dept: SURGERY | Facility: CLINIC | Age: 71
End: 2021-03-22

## 2021-03-22 VITALS
WEIGHT: 136 LBS | HEIGHT: 63 IN | TEMPERATURE: 98.4 F | DIASTOLIC BLOOD PRESSURE: 80 MMHG | BODY MASS INDEX: 24.1 KG/M2 | HEART RATE: 80 BPM | SYSTOLIC BLOOD PRESSURE: 132 MMHG | OXYGEN SATURATION: 100 %

## 2021-03-22 DIAGNOSIS — R19.8 PERFORATED VISCUS: Primary | ICD-10-CM

## 2021-03-22 PROCEDURE — 99024 POSTOP FOLLOW-UP VISIT: CPT | Performed by: SURGERY

## 2021-03-22 RX ORDER — MULTIVIT WITH MINERALS/LUTEIN
250 TABLET ORAL DAILY
COMMUNITY

## 2021-03-22 RX ORDER — PHENOL 1.4 %
600 AEROSOL, SPRAY (ML) MUCOUS MEMBRANE DAILY
COMMUNITY

## 2021-03-22 RX ORDER — BIOTIN 10000 MCG
CAPSULE ORAL
COMMUNITY

## 2021-03-22 RX ORDER — CHLORAL HYDRATE 500 MG
CAPSULE ORAL
COMMUNITY
End: 2021-07-07 | Stop reason: HOSPADM

## 2021-03-22 NOTE — PROGRESS NOTES
SUBJECTIVE:    Ms. Tabor is seen in the office today follow-up from her exploratory laparotomy with right hemicolectomy for perforated ascending colon.  She is doing well.  No fevers or chills no nausea or vomiting.    OBJECTIVE:    Incisions healing appropriately without infection.  Staples are removed today    ASSESSMENT:    Satisfactory postop progress    PLAN:    Recheck in the office in 1 month

## 2021-04-02 ENCOUNTER — READMISSION MANAGEMENT (OUTPATIENT)
Dept: CALL CENTER | Facility: HOSPITAL | Age: 71
End: 2021-04-02

## 2021-04-02 NOTE — OUTREACH NOTE
General Surgery Week 3 Survey      Responses   [facility] patient discharged from?  [facility]   Does the patient have one of the following disease processes/diagnoses(primary or secondary)?  General Surgery   Week 3 attempt successful?  Yes   Call start time  0945   Call end time  0946   Discharge diagnosis  Acute abdominal pain secondary to acute bowel perforation related to polypectomy    Meds reviewed with patient/caregiver?  Yes   Is the patient taking all medications as directed (includes completed medication regime)?  Yes   Has the patient kept scheduled appointments due by today?  Yes   What is the patient's perception of their health status since discharge?  Improving   Week 3 call completed?  Yes   Wrap up additional comments  Doing well.  No problems or questions at this time.          Mariajose Riggs, RN

## 2021-04-13 ENCOUNTER — READMISSION MANAGEMENT (OUTPATIENT)
Dept: CALL CENTER | Facility: HOSPITAL | Age: 71
End: 2021-04-13

## 2021-04-13 NOTE — OUTREACH NOTE
General Surgery Week 4 Survey      Responses   [facility] patient discharged from?  [facility]   Does the patient have one of the following disease processes/diagnoses(primary or secondary)?  General Surgery   Week 4 attempt successful?  No          Arianne Wilson RN

## 2021-04-27 ENCOUNTER — OFFICE VISIT (OUTPATIENT)
Dept: SURGERY | Facility: CLINIC | Age: 71
End: 2021-04-27

## 2021-04-27 VITALS
SYSTOLIC BLOOD PRESSURE: 139 MMHG | HEART RATE: 82 BPM | WEIGHT: 142 LBS | BODY MASS INDEX: 25.16 KG/M2 | HEIGHT: 63 IN | TEMPERATURE: 99.3 F | DIASTOLIC BLOOD PRESSURE: 69 MMHG | OXYGEN SATURATION: 99 %

## 2021-04-27 DIAGNOSIS — R19.8 PERFORATED VISCUS: Primary | ICD-10-CM

## 2021-04-27 PROCEDURE — 99024 POSTOP FOLLOW-UP VISIT: CPT | Performed by: SURGERY

## 2021-04-27 NOTE — PROGRESS NOTES
SUBJECTIVE:    Mariajose is seen in the office today follow-up from her exploratory laparotomy with right hemicolectomy for perforation of the ascending colon after colonoscopy.  She is doing well.  No fevers or chills.  No nausea or vomiting.  Bowels are moving well.    OBJECTIVE:    Incisions are good and strong.  No hernia.    ASSESSMENT:    Satisfactory postop progress    PLAN:    Recheck in our office as needed.  She is asked for recommendation for different GI groups we have given her a packet  for [facility]

## 2021-07-06 ENCOUNTER — APPOINTMENT (OUTPATIENT)
Dept: CT IMAGING | Facility: HOSPITAL | Age: 71
End: 2021-07-06

## 2021-07-06 ENCOUNTER — HOSPITAL ENCOUNTER (OUTPATIENT)
Facility: HOSPITAL | Age: 71
Discharge: HOME | End: 2021-07-07
Attending: EMERGENCY MEDICINE | Admitting: INTERNAL MEDICINE

## 2021-07-06 DIAGNOSIS — R10.9 ABDOMINAL PAIN, UNSPECIFIED ABDOMINAL LOCATION: Primary | ICD-10-CM

## 2021-07-06 LAB
ALBUMIN SERPL-MCNC: 4 G/DL (ref 3.5–5.2)
ALBUMIN SERPL-MCNC: 4.3 G/DL (ref 3.5–5.2)
ALBUMIN/GLOB SERPL: 1.6 G/DL
ALBUMIN/GLOB SERPL: 1.8 G/DL
ALP SERPL-CCNC: 75 U/L (ref 39–117)
ALP SERPL-CCNC: 82 U/L (ref 39–117)
ALT SERPL W P-5'-P-CCNC: 10 U/L (ref 1–33)
ALT SERPL W P-5'-P-CCNC: 10 U/L (ref 1–33)
ANION GAP SERPL CALCULATED.3IONS-SCNC: 10 MMOL/L (ref 5–15)
ANION GAP SERPL CALCULATED.3IONS-SCNC: 9 MMOL/L (ref 5–15)
AST SERPL-CCNC: 22 U/L (ref 1–32)
AST SERPL-CCNC: 22 U/L (ref 1–32)
BASOPHILS # BLD AUTO: 0.1 10*3/MM3 (ref 0–0.2)
BASOPHILS # BLD AUTO: 0.1 10*3/MM3 (ref 0–0.2)
BASOPHILS NFR BLD AUTO: 0.9 % (ref 0–1.5)
BASOPHILS NFR BLD AUTO: 0.9 % (ref 0–1.5)
BILIRUB SERPL-MCNC: 0.2 MG/DL (ref 0–1.2)
BILIRUB SERPL-MCNC: 0.2 MG/DL (ref 0–1.2)
BILIRUB UR QL STRIP: NEGATIVE
BUN SERPL-MCNC: 11 MG/DL (ref 8–23)
BUN SERPL-MCNC: 13 MG/DL (ref 8–23)
BUN/CREAT SERPL: 11.7 (ref 7–25)
BUN/CREAT SERPL: 12.9 (ref 7–25)
CALCIUM SPEC-SCNC: 8.9 MG/DL (ref 8.6–10.5)
CALCIUM SPEC-SCNC: 9.1 MG/DL (ref 8.6–10.5)
CHLORIDE SERPL-SCNC: 101 MMOL/L (ref 98–107)
CHLORIDE SERPL-SCNC: 102 MMOL/L (ref 98–107)
CLARITY UR: CLEAR
CO2 SERPL-SCNC: 28 MMOL/L (ref 22–29)
CO2 SERPL-SCNC: 28 MMOL/L (ref 22–29)
COLOR UR: YELLOW
CREAT SERPL-MCNC: 0.94 MG/DL (ref 0.57–1)
CREAT SERPL-MCNC: 1.01 MG/DL (ref 0.57–1)
D DIMER PPP FEU-MCNC: 0.32 MG/L (FEU) (ref 0–0.59)
DEPRECATED RDW RBC AUTO: 42 FL (ref 37–54)
DEPRECATED RDW RBC AUTO: 42.9 FL (ref 37–54)
EOSINOPHIL # BLD AUTO: 0 10*3/MM3 (ref 0–0.4)
EOSINOPHIL # BLD AUTO: 0.1 10*3/MM3 (ref 0–0.4)
EOSINOPHIL NFR BLD AUTO: 0.7 % (ref 0.3–6.2)
EOSINOPHIL NFR BLD AUTO: 1.1 % (ref 0.3–6.2)
ERYTHROCYTE [DISTWIDTH] IN BLOOD BY AUTOMATED COUNT: 14.1 % (ref 12.3–15.4)
ERYTHROCYTE [DISTWIDTH] IN BLOOD BY AUTOMATED COUNT: 14.2 % (ref 12.3–15.4)
GFR SERPL CREATININE-BSD FRML MDRD: 54 ML/MIN/1.73
GFR SERPL CREATININE-BSD FRML MDRD: 59 ML/MIN/1.73
GLOBULIN UR ELPH-MCNC: 2.4 GM/DL
GLOBULIN UR ELPH-MCNC: 2.5 GM/DL
GLUCOSE SERPL-MCNC: 104 MG/DL (ref 65–99)
GLUCOSE SERPL-MCNC: 111 MG/DL (ref 65–99)
GLUCOSE UR STRIP-MCNC: NEGATIVE MG/DL
HCT VFR BLD AUTO: 34 % (ref 34–46.6)
HCT VFR BLD AUTO: 34.6 % (ref 34–46.6)
HGB BLD-MCNC: 11.2 G/DL (ref 12–15.9)
HGB BLD-MCNC: 11.4 G/DL (ref 12–15.9)
HGB UR QL STRIP.AUTO: NEGATIVE
HOLD SPECIMEN: NORMAL
KETONES UR QL STRIP: NEGATIVE
LEUKOCYTE ESTERASE UR QL STRIP.AUTO: NEGATIVE
LIPASE SERPL-CCNC: 21 U/L (ref 13–60)
LYMPHOCYTES # BLD AUTO: 0.9 10*3/MM3 (ref 0.7–3.1)
LYMPHOCYTES # BLD AUTO: 1.2 10*3/MM3 (ref 0.7–3.1)
LYMPHOCYTES NFR BLD AUTO: 14.1 % (ref 19.6–45.3)
LYMPHOCYTES NFR BLD AUTO: 19.3 % (ref 19.6–45.3)
MCH RBC QN AUTO: 28.1 PG (ref 26.6–33)
MCH RBC QN AUTO: 28.7 PG (ref 26.6–33)
MCHC RBC AUTO-ENTMCNC: 32.5 G/DL (ref 31.5–35.7)
MCHC RBC AUTO-ENTMCNC: 33.3 G/DL (ref 31.5–35.7)
MCV RBC AUTO: 86.1 FL (ref 79–97)
MCV RBC AUTO: 86.6 FL (ref 79–97)
MONOCYTES # BLD AUTO: 0.5 10*3/MM3 (ref 0.1–0.9)
MONOCYTES # BLD AUTO: 0.6 10*3/MM3 (ref 0.1–0.9)
MONOCYTES NFR BLD AUTO: 7.5 % (ref 5–12)
MONOCYTES NFR BLD AUTO: 9.1 % (ref 5–12)
NEUTROPHILS NFR BLD AUTO: 4.5 10*3/MM3 (ref 1.7–7)
NEUTROPHILS NFR BLD AUTO: 4.8 10*3/MM3 (ref 1.7–7)
NEUTROPHILS NFR BLD AUTO: 70 % (ref 42.7–76)
NEUTROPHILS NFR BLD AUTO: 76.4 % (ref 42.7–76)
NITRITE UR QL STRIP: NEGATIVE
NRBC BLD AUTO-RTO: 0 /100 WBC (ref 0–0.2)
NRBC BLD AUTO-RTO: 0 /100 WBC (ref 0–0.2)
PH UR STRIP.AUTO: 8 [PH] (ref 5–8)
PLATELET # BLD AUTO: 341 10*3/MM3 (ref 140–450)
PLATELET # BLD AUTO: 350 10*3/MM3 (ref 140–450)
PMV BLD AUTO: 6.9 FL (ref 6–12)
PMV BLD AUTO: 7 FL (ref 6–12)
POTASSIUM SERPL-SCNC: 4.4 MMOL/L (ref 3.5–5.2)
POTASSIUM SERPL-SCNC: 4.5 MMOL/L (ref 3.5–5.2)
PROT SERPL-MCNC: 6.5 G/DL (ref 6–8.5)
PROT SERPL-MCNC: 6.7 G/DL (ref 6–8.5)
PROT UR QL STRIP: NEGATIVE
RBC # BLD AUTO: 3.95 10*6/MM3 (ref 3.77–5.28)
RBC # BLD AUTO: 4 10*6/MM3 (ref 3.77–5.28)
SARS-COV-2 RNA PNL SPEC NAA+PROBE: NOT DETECTED
SODIUM SERPL-SCNC: 138 MMOL/L (ref 136–145)
SODIUM SERPL-SCNC: 140 MMOL/L (ref 136–145)
SP GR UR STRIP: 1.01 (ref 1–1.03)
TROPONIN T SERPL-MCNC: <0.01 NG/ML (ref 0–0.03)
UROBILINOGEN UR QL STRIP: NORMAL
WBC # BLD AUTO: 6.3 10*3/MM3 (ref 3.4–10.8)
WBC # BLD AUTO: 6.5 10*3/MM3 (ref 3.4–10.8)

## 2021-07-06 PROCEDURE — 96375 TX/PRO/DX INJ NEW DRUG ADDON: CPT

## 2021-07-06 PROCEDURE — 25010000002 HYDROMORPHONE PER 4 MG: Performed by: FAMILY MEDICINE

## 2021-07-06 PROCEDURE — 25010000002 ONDANSETRON PER 1 MG: Performed by: EMERGENCY MEDICINE

## 2021-07-06 PROCEDURE — 80053 COMPREHEN METABOLIC PANEL: CPT | Performed by: NURSE PRACTITIONER

## 2021-07-06 PROCEDURE — 81003 URINALYSIS AUTO W/O SCOPE: CPT | Performed by: EMERGENCY MEDICINE

## 2021-07-06 PROCEDURE — G0378 HOSPITAL OBSERVATION PER HR: HCPCS

## 2021-07-06 PROCEDURE — 25010000002 ONDANSETRON PER 1 MG: Performed by: FAMILY MEDICINE

## 2021-07-06 PROCEDURE — 74177 CT ABD & PELVIS W/CONTRAST: CPT

## 2021-07-06 PROCEDURE — 85025 COMPLETE CBC W/AUTO DIFF WBC: CPT | Performed by: EMERGENCY MEDICINE

## 2021-07-06 PROCEDURE — 96376 TX/PRO/DX INJ SAME DRUG ADON: CPT

## 2021-07-06 PROCEDURE — 96374 THER/PROPH/DIAG INJ IV PUSH: CPT

## 2021-07-06 PROCEDURE — 25010000002 MORPHINE PER 10 MG: Performed by: EMERGENCY MEDICINE

## 2021-07-06 PROCEDURE — 36415 COLL VENOUS BLD VENIPUNCTURE: CPT | Performed by: NURSE PRACTITIONER

## 2021-07-06 PROCEDURE — 99213 OFFICE O/P EST LOW 20 MIN: CPT | Performed by: SURGERY

## 2021-07-06 PROCEDURE — 85379 FIBRIN DEGRADATION QUANT: CPT | Performed by: EMERGENCY MEDICINE

## 2021-07-06 PROCEDURE — 85025 COMPLETE CBC W/AUTO DIFF WBC: CPT | Performed by: NURSE PRACTITIONER

## 2021-07-06 PROCEDURE — U0005 INFEC AGEN DETEC AMPLI PROBE: HCPCS | Performed by: EMERGENCY MEDICINE

## 2021-07-06 PROCEDURE — U0003 INFECTIOUS AGENT DETECTION BY NUCLEIC ACID (DNA OR RNA); SEVERE ACUTE RESPIRATORY SYNDROME CORONAVIRUS 2 (SARS-COV-2) (CORONAVIRUS DISEASE [COVID-19]), AMPLIFIED PROBE TECHNIQUE, MAKING USE OF HIGH THROUGHPUT TECHNOLOGIES AS DESCRIBED BY CMS-2020-01-R: HCPCS | Performed by: EMERGENCY MEDICINE

## 2021-07-06 PROCEDURE — C9803 HOPD COVID-19 SPEC COLLECT: HCPCS

## 2021-07-06 PROCEDURE — 83690 ASSAY OF LIPASE: CPT | Performed by: EMERGENCY MEDICINE

## 2021-07-06 PROCEDURE — 25010000002 MORPHINE PER 10 MG: Performed by: FAMILY MEDICINE

## 2021-07-06 PROCEDURE — 96361 HYDRATE IV INFUSION ADD-ON: CPT

## 2021-07-06 PROCEDURE — 0 IOPAMIDOL PER 1 ML: Performed by: EMERGENCY MEDICINE

## 2021-07-06 PROCEDURE — 80053 COMPREHEN METABOLIC PANEL: CPT | Performed by: EMERGENCY MEDICINE

## 2021-07-06 PROCEDURE — 99283 EMERGENCY DEPT VISIT LOW MDM: CPT

## 2021-07-06 PROCEDURE — 84484 ASSAY OF TROPONIN QUANT: CPT | Performed by: EMERGENCY MEDICINE

## 2021-07-06 RX ORDER — ONDANSETRON 2 MG/ML
4 INJECTION INTRAMUSCULAR; INTRAVENOUS ONCE
Status: COMPLETED | OUTPATIENT
Start: 2021-07-06 | End: 2021-07-06

## 2021-07-06 RX ORDER — MORPHINE SULFATE 4 MG/ML
4 INJECTION, SOLUTION INTRAMUSCULAR; INTRAVENOUS ONCE
Status: COMPLETED | OUTPATIENT
Start: 2021-07-06 | End: 2021-07-06

## 2021-07-06 RX ORDER — SODIUM CHLORIDE 9 MG/ML
100 INJECTION, SOLUTION INTRAVENOUS CONTINUOUS
Status: DISCONTINUED | OUTPATIENT
Start: 2021-07-06 | End: 2021-07-07 | Stop reason: HOSPADM

## 2021-07-06 RX ORDER — PANTOPRAZOLE SODIUM 40 MG/1
40 TABLET, DELAYED RELEASE ORAL
Status: DISCONTINUED | OUTPATIENT
Start: 2021-07-07 | End: 2021-07-07 | Stop reason: HOSPADM

## 2021-07-06 RX ORDER — MULTIPLE VITAMINS W/ MINERALS TAB 9MG-400MCG
1 TAB ORAL DAILY
Status: DISCONTINUED | OUTPATIENT
Start: 2021-07-06 | End: 2021-07-07

## 2021-07-06 RX ORDER — MORPHINE SULFATE 4 MG/ML
4 INJECTION, SOLUTION INTRAMUSCULAR; INTRAVENOUS
Status: DISCONTINUED | OUTPATIENT
Start: 2021-07-06 | End: 2021-07-06

## 2021-07-06 RX ORDER — CHOLECALCIFEROL (VITAMIN D3) 125 MCG
5 CAPSULE ORAL NIGHTLY PRN
Status: DISCONTINUED | OUTPATIENT
Start: 2021-07-06 | End: 2021-07-07 | Stop reason: HOSPADM

## 2021-07-06 RX ORDER — POTASSIUM CHLORIDE 7.45 MG/ML
10 INJECTION INTRAVENOUS
Status: DISCONTINUED | OUTPATIENT
Start: 2021-07-06 | End: 2021-07-07 | Stop reason: HOSPADM

## 2021-07-06 RX ORDER — LISINOPRIL 20 MG/1
20 TABLET ORAL
Status: DISCONTINUED | OUTPATIENT
Start: 2021-07-06 | End: 2021-07-07 | Stop reason: HOSPADM

## 2021-07-06 RX ORDER — CLOBETASOL PROPIONATE 0.5 MG/G
CREAM TOPICAL NIGHTLY
Status: DISCONTINUED | OUTPATIENT
Start: 2021-07-06 | End: 2021-07-06

## 2021-07-06 RX ORDER — MAGNESIUM SULFATE HEPTAHYDRATE 40 MG/ML
2 INJECTION, SOLUTION INTRAVENOUS AS NEEDED
Status: DISCONTINUED | OUTPATIENT
Start: 2021-07-06 | End: 2021-07-07 | Stop reason: HOSPADM

## 2021-07-06 RX ORDER — PANTOPRAZOLE SODIUM 40 MG/10ML
40 INJECTION, POWDER, LYOPHILIZED, FOR SOLUTION INTRAVENOUS ONCE
Status: COMPLETED | OUTPATIENT
Start: 2021-07-06 | End: 2021-07-06

## 2021-07-06 RX ORDER — HYDROMORPHONE HCL 110MG/55ML
0.5 PATIENT CONTROLLED ANALGESIA SYRINGE INTRAVENOUS EVERY 4 HOURS PRN
Status: DISCONTINUED | OUTPATIENT
Start: 2021-07-06 | End: 2021-07-07 | Stop reason: HOSPADM

## 2021-07-06 RX ORDER — MAGNESIUM SULFATE 1 G/100ML
1 INJECTION INTRAVENOUS AS NEEDED
Status: DISCONTINUED | OUTPATIENT
Start: 2021-07-06 | End: 2021-07-07 | Stop reason: HOSPADM

## 2021-07-06 RX ORDER — ACETAMINOPHEN 325 MG/1
650 TABLET ORAL EVERY 6 HOURS PRN
Status: DISCONTINUED | OUTPATIENT
Start: 2021-07-06 | End: 2021-07-07 | Stop reason: HOSPADM

## 2021-07-06 RX ORDER — HYDROXYCHLOROQUINE SULFATE 200 MG/1
200 TABLET, FILM COATED ORAL 2 TIMES DAILY
Status: DISCONTINUED | OUTPATIENT
Start: 2021-07-06 | End: 2021-07-07 | Stop reason: HOSPADM

## 2021-07-06 RX ORDER — HYDRALAZINE HYDROCHLORIDE 20 MG/ML
20 INJECTION INTRAMUSCULAR; INTRAVENOUS EVERY 6 HOURS PRN
Status: DISCONTINUED | OUTPATIENT
Start: 2021-07-06 | End: 2021-07-07 | Stop reason: HOSPADM

## 2021-07-06 RX ORDER — ONDANSETRON 2 MG/ML
4 INJECTION INTRAMUSCULAR; INTRAVENOUS EVERY 6 HOURS PRN
Status: DISCONTINUED | OUTPATIENT
Start: 2021-07-06 | End: 2021-07-07

## 2021-07-06 RX ORDER — POTASSIUM CHLORIDE 20 MEQ/1
40 TABLET, EXTENDED RELEASE ORAL AS NEEDED
Status: DISCONTINUED | OUTPATIENT
Start: 2021-07-06 | End: 2021-07-07 | Stop reason: HOSPADM

## 2021-07-06 RX ORDER — PANTOPRAZOLE SODIUM 40 MG/10ML
40 INJECTION, POWDER, LYOPHILIZED, FOR SOLUTION INTRAVENOUS
Status: DISCONTINUED | OUTPATIENT
Start: 2021-07-06 | End: 2021-07-06

## 2021-07-06 RX ORDER — AMITRIPTYLINE HYDROCHLORIDE 25 MG/1
25 TABLET, FILM COATED ORAL NIGHTLY
Status: DISCONTINUED | OUTPATIENT
Start: 2021-07-06 | End: 2021-07-06

## 2021-07-06 RX ORDER — ASPIRIN 81 MG/1
81 TABLET ORAL DAILY
Status: DISCONTINUED | OUTPATIENT
Start: 2021-07-06 | End: 2021-07-06

## 2021-07-06 RX ORDER — SODIUM CHLORIDE 0.9 % (FLUSH) 0.9 %
10 SYRINGE (ML) INJECTION AS NEEDED
Status: DISCONTINUED | OUTPATIENT
Start: 2021-07-06 | End: 2021-07-07 | Stop reason: HOSPADM

## 2021-07-06 RX ORDER — POTASSIUM CHLORIDE 1.5 G/1.77G
40 POWDER, FOR SOLUTION ORAL AS NEEDED
Status: DISCONTINUED | OUTPATIENT
Start: 2021-07-06 | End: 2021-07-07 | Stop reason: HOSPADM

## 2021-07-06 RX ORDER — HYDROXYCHLOROQUINE SULFATE 200 MG/1
200 TABLET, FILM COATED ORAL 2 TIMES DAILY
COMMUNITY

## 2021-07-06 RX ADMIN — PANTOPRAZOLE SODIUM 40 MG: 40 INJECTION, POWDER, FOR SOLUTION INTRAVENOUS at 06:15

## 2021-07-06 RX ADMIN — ONDANSETRON 4 MG: 2 INJECTION INTRAMUSCULAR; INTRAVENOUS at 16:16

## 2021-07-06 RX ADMIN — IOPAMIDOL 100 ML: 755 INJECTION, SOLUTION INTRAVENOUS at 02:01

## 2021-07-06 RX ADMIN — PANTOPRAZOLE SODIUM 40 MG: 40 INJECTION, POWDER, FOR SOLUTION INTRAVENOUS at 01:11

## 2021-07-06 RX ADMIN — Medication 10 ML: at 20:00

## 2021-07-06 RX ADMIN — MORPHINE SULFATE 4 MG: 4 INJECTION INTRAVENOUS at 09:46

## 2021-07-06 RX ADMIN — ONDANSETRON 4 MG: 2 INJECTION INTRAMUSCULAR; INTRAVENOUS at 01:11

## 2021-07-06 RX ADMIN — MULTIPLE VITAMINS W/ MINERALS TAB 1 TABLET: TAB at 09:11

## 2021-07-06 RX ADMIN — ONDANSETRON 4 MG: 2 INJECTION INTRAMUSCULAR; INTRAVENOUS at 09:46

## 2021-07-06 RX ADMIN — HYDROMORPHONE HYDROCHLORIDE 0.5 MG: 2 INJECTION, SOLUTION INTRAMUSCULAR; INTRAVENOUS; SUBCUTANEOUS at 16:15

## 2021-07-06 RX ADMIN — HYDROXYCHLOROQUINE SULFATE 200 MG: 200 TABLET ORAL at 20:00

## 2021-07-06 RX ADMIN — HYDROXYCHLOROQUINE SULFATE 200 MG: 200 TABLET ORAL at 09:10

## 2021-07-06 RX ADMIN — HYDROMORPHONE HYDROCHLORIDE 0.5 MG: 2 INJECTION, SOLUTION INTRAMUSCULAR; INTRAVENOUS; SUBCUTANEOUS at 11:51

## 2021-07-06 RX ADMIN — SODIUM CHLORIDE 100 ML/HR: 9 INJECTION, SOLUTION INTRAVENOUS at 06:15

## 2021-07-06 RX ADMIN — SODIUM CHLORIDE 100 ML/HR: 9 INJECTION, SOLUTION INTRAVENOUS at 16:13

## 2021-07-06 RX ADMIN — ACETAMINOPHEN 650 MG: 325 TABLET, FILM COATED ORAL at 11:12

## 2021-07-06 RX ADMIN — MORPHINE SULFATE 4 MG: 4 INJECTION INTRAVENOUS at 01:11

## 2021-07-06 RX ADMIN — Medication 10 ML: at 06:15

## 2021-07-06 RX ADMIN — MORPHINE SULFATE 4 MG: 4 INJECTION INTRAVENOUS at 03:39

## 2021-07-06 NOTE — ED PROVIDER NOTES
Subjective   Patient is here with abdominal pain.  Started 830 pm.  Sudden onset.  Crampy.  Constant and worsening.  Severe.  Radiating down here whole lower abdomen.  Had partial colectomy done in march after a perforation from colonoscopy.  Pain started in the same place so presented here.  Has had a bowel movement since pain started and no improvement.  No vomiting.  No fever.            Review of Systems   Constitutional: Negative for fever.   HENT: Negative.    Eyes: Negative.    Respiratory: Negative.    Cardiovascular: Negative.    Gastrointestinal: Positive for abdominal pain and nausea. Negative for diarrhea and vomiting.   Genitourinary: Negative for dysuria.   Musculoskeletal: Negative.    Skin: Negative.    Psychiatric/Behavioral: Negative.    All other systems reviewed and are negative.      Past Medical History:   Diagnosis Date   • GERD (gastroesophageal reflux disease)    • Hypertension    • Migraine        No Known Allergies    Past Surgical History:   Procedure Laterality Date   • CATARACT EXTRACTION     • COLONOSCOPY     • DILATATION AND CURETTAGE     • EXPLORATORY LAPAROTOMY N/A 3/2/2021    Procedure: LAPAROTOMY EXPLORATORY with RIGHT COLON RESECTION;  Surgeon: Pedro Wren DO;  Location: [facility];  Service: General;  Laterality: N/A;   • HERNIA REPAIR         No family history on file.    Social History     Socioeconomic History   • Marital status:      Spouse name: Not on file   • Number of children: Not on file   • Years of education: Not on file   • Highest education level: Not on file   Tobacco Use   • Smoking status: Former Smoker     Types: Cigarettes     Quit date: 3/22/2011     Years since quitting: 10.2   • Smokeless tobacco: Never Used   Substance and Sexual Activity   • Alcohol use: Yes   • Drug use: Never   • Sexual activity: Defer           Objective   Physical Exam  Vitals and nursing note reviewed.   Constitutional:       Appearance: She is well-developed.   HENT:       Head: Normocephalic and atraumatic.   Eyes:      Extraocular Movements: Extraocular movements intact.      Pupils: Pupils are equal, round, and reactive to light.   Cardiovascular:      Rate and Rhythm: Normal rate and regular rhythm.      Pulses: Normal pulses.      Heart sounds: Normal heart sounds.   Pulmonary:      Effort: Pulmonary effort is normal.      Breath sounds: Normal breath sounds.   Abdominal:      General: Abdomen is flat. Bowel sounds are normal.      Tenderness: There is abdominal tenderness.       Musculoskeletal:         General: Normal range of motion.      Cervical back: Neck supple.   Skin:     General: Skin is warm and dry.   Neurological:      General: No focal deficit present.      Mental Status: She is alert and oriented to person, place, and time.   Psychiatric:         Mood and Affect: Mood normal.         Behavior: Behavior normal.         Thought Content: Thought content normal.         Judgment: Judgment normal.         Procedures           ED Course           Results for orders placed or performed during the hospital encounter of 07/06/21   Comprehensive Metabolic Panel    Specimen: Blood   Result Value Ref Range    Glucose 111 (H) 65 - 99 mg/dL    BUN 13 8 - 23 mg/dL    Creatinine 1.01 (H) 0.57 - 1.00 mg/dL    Sodium 138 136 - 145 mmol/L    Potassium 4.4 3.5 - 5.2 mmol/L    Chloride 101 98 - 107 mmol/L    CO2 28.0 22.0 - 29.0 mmol/L    Calcium 9.1 8.6 - 10.5 mg/dL    Total Protein 6.7 6.0 - 8.5 g/dL    Albumin 4.30 3.50 - 5.20 g/dL    ALT (SGPT) 10 1 - 33 U/L    AST (SGOT) 22 1 - 32 U/L    Alkaline Phosphatase 82 39 - 117 U/L    Total Bilirubin 0.2 0.0 - 1.2 mg/dL    eGFR Non African Amer 54 (L) >60 mL/min/1.73    Globulin 2.4 gm/dL    A/G Ratio 1.8 g/dL    BUN/Creatinine Ratio 12.9 7.0 - 25.0    Anion Gap 9.0 5.0 - 15.0 mmol/L   Lipase    Specimen: Blood   Result Value Ref Range    Lipase 21 13 - 60 U/L   Troponin    Specimen: Blood   Result Value Ref Range    Troponin T  <0.010 0.000 - 0.030 ng/mL   Urinalysis With Culture If Indicated - Urine, Clean Catch    Specimen: Urine, Clean Catch   Result Value Ref Range    Color, UA Yellow Yellow, Straw    Appearance, UA Clear Clear    pH, UA 8.0 5.0 - 8.0    Specific Gravity, UA 1.013 1.005 - 1.030    Glucose, UA Negative Negative    Ketones, UA Negative Negative    Bilirubin, UA Negative Negative    Blood, UA Negative Negative    Protein, UA Negative Negative    Leuk Esterase, UA Negative Negative    Nitrite, UA Negative Negative    Urobilinogen, UA 0.2 E.U./dL 0.2 - 1.0 E.U./dL   CBC Auto Differential    Specimen: Blood   Result Value Ref Range    WBC 6.30 3.40 - 10.80 10*3/mm3    RBC 3.95 3.77 - 5.28 10*6/mm3    Hemoglobin 11.4 (L) 12.0 - 15.9 g/dL    Hematocrit 34.0 34.0 - 46.6 %    MCV 86.1 79.0 - 97.0 fL    MCH 28.7 26.6 - 33.0 pg    MCHC 33.3 31.5 - 35.7 g/dL    RDW 14.1 12.3 - 15.4 %    RDW-SD 42.0 37.0 - 54.0 fl    MPV 7.0 6.0 - 12.0 fL    Platelets 350 140 - 450 10*3/mm3    Neutrophil % 76.4 (H) 42.7 - 76.0 %    Lymphocyte % 14.1 (L) 19.6 - 45.3 %    Monocyte % 7.5 5.0 - 12.0 %    Eosinophil % 1.1 0.3 - 6.2 %    Basophil % 0.9 0.0 - 1.5 %    Neutrophils, Absolute 4.80 1.70 - 7.00 10*3/mm3    Lymphocytes, Absolute 0.90 0.70 - 3.10 10*3/mm3    Monocytes, Absolute 0.50 0.10 - 0.90 10*3/mm3    Eosinophils, Absolute 0.10 0.00 - 0.40 10*3/mm3    Basophils, Absolute 0.10 0.00 - 0.20 10*3/mm3    nRBC 0.0 0.0 - 0.2 /100 WBC   D-dimer, Quantitative    Specimen: Blood   Result Value Ref Range    D-Dimer, Quantitative 0.32 0.00 - 0.59 mg/L (FEU)   Gold Top - SST   Result Value Ref Range    Extra Tube Hold for add-ons.         CT scan no acute process                             MDM  Number of Diagnoses or Management Options  Abdominal pain, unspecified abdominal location  Diagnosis management comments: Patient with persistent abdominal pain.  Medication helps somewhat but not significantly.  On reexamination she does have some upper  abdominal guarding.  No rigidity or peritonitis.  Potentially this could be gallbladder.  It started after eating.  However her anastomosis looks intact from her surgery prior.  At this point time we will bring her as an observation and Dr. Wren can see her in the hospital.  I spoke with Jordana Wells regarding this.  She is okay with admission.       Amount and/or Complexity of Data Reviewed  Clinical lab tests: reviewed  Tests in the radiology section of CPT®: reviewed  Discussion of test results with the performing providers: yes  Discuss the patient with other providers: yes  Independent visualization of images, tracings, or specimens: yes    Patient Progress  Patient progress: stable      Final diagnoses:   None   Abdominal pain    ED Disposition  ED Disposition     None          No follow-up provider specified.       Medication List      No changes were made to your prescriptions during this visit.          Srikanth Hcikman,   07/06/21 0347

## 2021-07-06 NOTE — CONSULTS
Subjective   Mariajose Tabor is a 71 y.o. female.     History of present illness  I was asked to see this pleasant 71-year-old female in consultation for severe abdominal pain that occurred in the upper abdomen and radiated to the back that began last night.  Patient has never had similar pain in the past.  She did have a perforation of her ascending colon from a colonoscopy but that pain was different than what she experienced last night and this morning.  She denied nausea and vomiting with the pain.  She had a normal bowel movement last night about 830.    Past Medical History:   Diagnosis Date   • GERD (gastroesophageal reflux disease)    • Hypertension    • Migraine        Past Surgical History:   Procedure Laterality Date   • CATARACT EXTRACTION     • COLONOSCOPY     • DILATATION AND CURETTAGE     • EXPLORATORY LAPAROTOMY N/A 3/2/2021    Procedure: LAPAROTOMY EXPLORATORY with RIGHT COLON RESECTION;  Surgeon: Pedro Wren DO;  Location: [facility] MAIN OR;  Service: General;  Laterality: N/A;   • HERNIA REPAIR     • THUMB ARTHROSCOPY Bilateral     thumb fusion       [unfilled]    No Known Allergies    History reviewed. No pertinent family history.    Social History     Socioeconomic History   • Marital status:      Spouse name: Not on file   • Number of children: Not on file   • Years of education: Not on file   • Highest education level: Not on file   Tobacco Use   • Smoking status: Former Smoker     Types: Cigarettes     Quit date: 3/22/2011     Years since quitting: 10.2   • Smokeless tobacco: Never Used   Substance and Sexual Activity   • Alcohol use: Yes     Alcohol/week: 7.0 standard drinks     Types: 7 Shots of liquor per week   • Drug use: Never   • Sexual activity: Defer       The following portions of the patient's history were reviewed and updated as appropriate: allergies, current medications, past family history, past medical history, past social history, past surgical history and problem  list.    Objective   Complete review of systems is done and unremarkable with exception of the chief complaint and the above-noted specific exceptions    Physical exam shows a pleasant 71-year-old female.  She HEENT is negative.  Heart regular.  Lungs are clear.  Abdomen is soft.  There is no guarding there is no rebound there is no palpable mass there is no hernia.  She has a well-formed midline incision from her previous right colon resection.    I personally reviewed her laboratory data and her CT.  There is no evidence for pancreatitis for bowel thickening obstruction/inflammation    Impression: 71-year-old with abdominal pain.  When her pain medicine wears off her pain seems to be located primarily in the epigastrium.  No history of ulcer disease    Recommendation: She certainly does not have a surgical abdomen.  We will ask GI to see for their input.  Will reevaluate in the morning    Assessment/Plan                  Pedro Wren DO  7/6/2021  17:29 EDT

## 2021-07-06 NOTE — PLAN OF CARE
Problem: Adult Inpatient Plan of Care  Goal: Plan of Care Review  Outcome: Ongoing, Progressing  Goal: Patient-Specific Goal (Individualized)  Outcome: Ongoing, Progressing  Goal: Absence of Hospital-Acquired Illness or Injury  Outcome: Ongoing, Progressing  Intervention: Identify and Manage Fall Risk  Description: Perform standard risk assessment with a validated tool or comprehensive approach appropriate to the patient on admission; reassess fall risk frequently, with change in status or transfer to another level of care.  Communicate fall injury risk to interprofessional healthcare team.  Determine need for increased observation, equipment and environmental modification, such as low bed and signage, as well as supportive, nonskid footwear.  Adjust safety measures to individual developmental age, stage and identified risk factors.  Reinforce the importance of safety and physical activity with patient and family.  Perform regular intentional rounding to assess need for position change, pain assessment, personal needs, including assistance with toileting.  Recent Flowsheet Documentation  Taken 7/6/2021 1525 by Felicia Barrios LPN  Safety Promotion/Fall Prevention:   safety round/check completed   nonskid shoes/slippers when out of bed   fall prevention program maintained  Taken 7/6/2021 1300 by Felicia Barrios LPN  Safety Promotion/Fall Prevention:   safety round/check completed   nonskid shoes/slippers when out of bed   fall prevention program maintained  Taken 7/6/2021 1123 by Felicia Barrios LPN  Safety Promotion/Fall Prevention:   safety round/check completed   nonskid shoes/slippers when out of bed   fall prevention program maintained  Taken 7/6/2021 0946 by Felicia Barrios LPN  Safety Promotion/Fall Prevention:   safety round/check completed   nonskid shoes/slippers when out of bed   fall prevention program maintained  Goal: Optimal Comfort and Wellbeing  Outcome: Ongoing, Progressing  Goal: Readiness  for Transition of Care  Outcome: Ongoing, Progressing   Goal Outcome Evaluation:         Hourly rounding completed this shift, no complaints or needs voiced.  Patient observed with eyes closed in supine position. Rise and fall of chest observed. Dressings clean dry and intact. Precautions in place ,will continue to monitor purposefully.

## 2021-07-06 NOTE — PLAN OF CARE
Goal Outcome Evaluation:               Pt arrived to floor w/. No c/o pain @ this moment. PRN morphine given in ED: effective. Will call Dr. Talbot to report pt being admitted. Ind w/ADL's. VSS.

## 2021-07-06 NOTE — ED NOTES
Pt reports at 8:30 pm her abd started hurting and it radiates to her back. Pt says she has abd cramping and nausea with pain. In March she had Colon resection. Pt reports intermittent constipation but has modified diet to help with that. Pt denies blood in stool, denies vomiting. Pt denies chest pain, admits SOA. Pain better when standing, worse when laying/ sitting.     Gabino Barrios, SAI  07/06/21 0050       Gabino Barrios RN  07/06/21 0053

## 2021-07-06 NOTE — H&P
Patient Care Team:  Ke Talbot MD as PCP - General (Family Medicine)    Chief Conplaint  Subjective     The patient is a 71 y.o. female presents with acute abdominal pain    HPI  Patient was in usual state of health approximately 2 days prior to presentation when she began to experience the insidious onset of some progressive diffuse abdominal pain.  She presented to the [facility] emergency room for evaluation and was evaluated and was found to have some unremarkable imaging.  She began to feel somewhat better but was placed in observation.  At the time of my evaluation she still has some diffuse abdominal pain but is unable to localize the discomfort.  She does have a history of recent intra-abdominal surgery with right colon resection performed.  Denies other constitutional complaints     review of Systems  Review of Systems   Constitutional: Positive for activity change.   Respiratory: Negative.    Cardiovascular: Negative.    Gastrointestinal: Positive for abdominal pain. Negative for abdominal distention and blood in stool.   Genitourinary: Negative.    Neurological: Negative.        History  Past Medical History:   Diagnosis Date   • GERD (gastroesophageal reflux disease)    • Hypertension    • Migraine      Past Surgical History:   Procedure Laterality Date   • CATARACT EXTRACTION     • COLONOSCOPY     • DILATATION AND CURETTAGE     • EXPLORATORY LAPAROTOMY N/A 3/2/2021    Procedure: LAPAROTOMY EXPLORATORY with RIGHT COLON RESECTION;  Surgeon: Pedro Wren DO;  Location: [facility] MAIN OR;  Service: General;  Laterality: N/A;   • HERNIA REPAIR     • THUMB ARTHROSCOPY Bilateral     thumb fusion     History reviewed. No pertinent family history.  Social History     Tobacco Use   • Smoking status: Former Smoker     Types: Cigarettes     Quit date: 3/22/2011     Years since quitting: 10.2   • Smokeless tobacco: Never Used   Substance Use Topics   • Alcohol use: Yes     Alcohol/week: 7.0 standard  drinks     Types: 7 Shots of liquor per week   • Drug use: Never     Allergies:  Patient has no known allergies.    Objective     Vital Signs  Temp:  [97.9 °F (36.6 °C)-98.9 °F (37.2 °C)] 98.1 °F (36.7 °C)  Heart Rate:  [75-89] 79  Resp:  [14-18] 16  BP: (107-151)/(44-80) 128/55      Physical Exam:   Physical Exam  Vitals and nursing note reviewed.   Constitutional:       Appearance: Normal appearance.   HENT:      Head: Normocephalic and atraumatic.   Cardiovascular:      Rate and Rhythm: Normal rate.      Pulses: Normal pulses.   Pulmonary:      Breath sounds: Normal breath sounds.   Abdominal:      General: There is distension.      Palpations: Abdomen is soft. There is no mass.      Tenderness: There is abdominal tenderness. There is no guarding or rebound.      Hernia: No hernia is present.   Neurological:      Mental Status: She is alert.              Results Review:   CBC    Results from last 7 days   Lab Units 07/06/21  0515 07/06/21  0109   WBC 10*3/mm3 6.50 6.30   HEMOGLOBIN g/dL 11.2* 11.4*   PLATELETS 10*3/mm3 341 350     BMP   Results from last 7 days   Lab Units 07/06/21  0515 07/06/21  0109   SODIUM mmol/L 140 138   POTASSIUM mmol/L 4.5 4.4   CHLORIDE mmol/L 102 101   CO2 mmol/L 28.0 28.0   BUN mg/dL 11 13   CREATININE mg/dL 0.94 1.01*   GLUCOSE mg/dL 104* 111*     Cr Clearance Estimated Creatinine Clearance: 50.6 mL/min (by C-G formula based on SCr of 0.94 mg/dL).  Coag     HbA1C No results found for: HGBA1C  Blood Glucose No results found for: POCGLU  Infection     CMP   Results from last 7 days   Lab Units 07/06/21  0515 07/06/21  0109   SODIUM mmol/L 140 138   POTASSIUM mmol/L 4.5 4.4   CHLORIDE mmol/L 102 101   CO2 mmol/L 28.0 28.0   BUN mg/dL 11 13   CREATININE mg/dL 0.94 1.01*   GLUCOSE mg/dL 104* 111*   ALBUMIN g/dL 4.00 4.30   BILIRUBIN mg/dL 0.2 0.2   ALK PHOS U/L 75 82   AST (SGOT) U/L 22 22   ALT (SGPT) U/L 10 10   LIPASE U/L  --  21     UA    Results from last 7 days   Lab Units  07/06/21  0109   NITRITE UA  Negative     Radiology(recent) CT Abdomen Pelvis With Contrast    Result Date: 7/6/2021  1.  No acute abnormalities are identified. 2.  The patient has had interval surgery with a new right-sided colonic anastomosis. 3.  No bowel wall thickening, obstruction or inflammation. 4.  Colonic diverticulosis without diverticulitis. 5.  Renal cysts. Electronically signed by:  Yaya Conley M.D.  7/6/2021 12:30 AM       Assessment:      Abdominal pain    Acute abdominal pain  Status post elective exploratory laparotomy with right colon resection 3/2/2021  Acute pneumoperitoneum  Status post elective colonoscopy with polypectomy of a 3.5 cm polyp 3/1/2021  Postoperative anemia secondary to acute losses and distributive phenomenon  Primary essential hypertension  Gastroesophageal reflux disease without esophagitis  Perennial allergic rhinitis  Degenerative disc disease cervical spine  Raynaud's disease  History of mixed migraine headaches      Plan:  Observe//bowel rest//surgical evaluation  Expected Length of Stay 2 days    I discussed the patients findings and my recommendations with patient and nursing staff.     Ke Talbot MD  07/06/21  07:51 EDT

## 2021-07-06 NOTE — CASE MANAGEMENT/SOCIAL WORK
Discharge Planning Assessment  [facility]     Patient Name: Mariajose Tabor  MRN: [id number]  Today's Date: 7/6/2021    Admit Date: 7/6/2021    Discharge Needs Assessment     Row Name 07/06/21 1154       Living Environment    Lives With  spouse    Current Living Arrangements  home/apartment/condo    Primary Care Provided by  self    Provides Primary Care For  no one    Family Caregiver if Needed  spouse    Quality of Family Relationships  helpful    Able to Return to Prior Arrangements  yes       Resource/Environmental Concerns    Resource/Environmental Concerns  none    Transportation Concerns  car, none       Transition Planning    Patient/Family Anticipates Transition to  home    Patient/Family Anticipated Services at Transition  none    Transportation Anticipated  family or friend will provide       Discharge Needs Assessment    Readmission Within the Last 30 Days  no previous admission in last 30 days    Equipment Currently Used at Home  none    Concerns to be Addressed  denies needs/concerns at this time    Anticipated Changes Related to Illness  none    Equipment Needed After Discharge  none        Discharge Plan     Row Name 07/06/21 1155       Plan    Plan  D/C Plan: Home with family.    Patient/Family in Agreement with Plan  yes    Plan Comments   spoke to patient at bedside wearing mask and goggles and keeping distance greater than 6 feet and spent less than 15 minutes in room. Patient lives with spouse, is IADLs and drives. PCP and pharmacy verified-denies any difficulty affording meds. Patient denies any d/c needs at this time and spouse can provide transport at d/c. Barrier to D/C: IVFs, general surgery consult.          Expected Discharge Date and Time     Expected Discharge Date Expected Discharge Time    Jul 7, 2021         Demographic Summary     Row Name 07/06/21 1154       General Information    Admission Type  observation    Arrived From  emergency department    Referral Source   admission list    Reason for Consult  discharge planning    Preferred Language  English     Used During This Interaction  no        Functional Status     Row Name 07/06/21 1154       Functional Status    Usual Activity Tolerance  good    Current Activity Tolerance  good       Functional Status, IADL    Medications  independent    Meal Preparation  independent    Housekeeping  independent    Laundry  independent    Shopping  independent       Mental Status    General Appearance WDL  WDL       Mental Status Summary    Recent Changes in Mental Status/Cognitive Functioning  no changes          Patient Forms     Row Name 07/06/21 1156       Patient Forms    Important Message from Medicare ([facility])  -- HARTMANN 7/6/21 by registration            Lis Nelson

## 2021-07-07 ENCOUNTER — ON CAMPUS - OUTPATIENT (OUTPATIENT)
Dept: URBAN - METROPOLITAN AREA HOSPITAL 85 | Facility: HOSPITAL | Age: 71
End: 2021-07-07

## 2021-07-07 ENCOUNTER — ANESTHESIA EVENT (OUTPATIENT)
Dept: GASTROENTEROLOGY | Facility: HOSPITAL | Age: 71
End: 2021-07-07

## 2021-07-07 ENCOUNTER — ANESTHESIA (OUTPATIENT)
Dept: GASTROENTEROLOGY | Facility: HOSPITAL | Age: 71
End: 2021-07-07

## 2021-07-07 VITALS
BODY MASS INDEX: 24.63 KG/M2 | WEIGHT: 139 LBS | TEMPERATURE: 98.1 F | RESPIRATION RATE: 15 BRPM | HEIGHT: 63 IN | SYSTOLIC BLOOD PRESSURE: 136 MMHG | DIASTOLIC BLOOD PRESSURE: 56 MMHG | HEART RATE: 76 BPM | OXYGEN SATURATION: 100 %

## 2021-07-07 VITALS — SYSTOLIC BLOOD PRESSURE: 155 MMHG | OXYGEN SATURATION: 100 % | DIASTOLIC BLOOD PRESSURE: 60 MMHG | HEART RATE: 93 BPM

## 2021-07-07 DIAGNOSIS — K31.7 POLYP OF STOMACH AND DUODENUM: ICD-10-CM

## 2021-07-07 DIAGNOSIS — R10.13 EPIGASTRIC PAIN: ICD-10-CM

## 2021-07-07 DIAGNOSIS — K44.9 DIAPHRAGMATIC HERNIA WITHOUT OBSTRUCTION OR GANGRENE: ICD-10-CM

## 2021-07-07 LAB
ALBUMIN SERPL-MCNC: 3.4 G/DL (ref 3.5–5.2)
ALP SERPL-CCNC: 73 U/L (ref 39–117)
ALT SERPL W P-5'-P-CCNC: 9 U/L (ref 1–33)
AMYLASE SERPL-CCNC: 77 U/L (ref 28–100)
ANION GAP SERPL CALCULATED.3IONS-SCNC: 9 MMOL/L (ref 5–15)
AST SERPL-CCNC: 18 U/L (ref 1–32)
BASOPHILS # BLD AUTO: 0.1 10*3/MM3 (ref 0–0.2)
BASOPHILS NFR BLD AUTO: 0.8 % (ref 0–1.5)
BILIRUB CONJ SERPL-MCNC: <0.2 MG/DL (ref 0–0.3)
BILIRUB INDIRECT SERPL-MCNC: ABNORMAL MG/DL
BILIRUB SERPL-MCNC: 0.2 MG/DL (ref 0–1.2)
BUN SERPL-MCNC: 11 MG/DL (ref 8–23)
BUN/CREAT SERPL: 14.7 (ref 7–25)
CALCIUM SPEC-SCNC: 8.2 MG/DL (ref 8.6–10.5)
CHLORIDE SERPL-SCNC: 106 MMOL/L (ref 98–107)
CO2 SERPL-SCNC: 24 MMOL/L (ref 22–29)
CREAT SERPL-MCNC: 0.75 MG/DL (ref 0.57–1)
DEPRECATED RDW RBC AUTO: 44.2 FL (ref 37–54)
EOSINOPHIL # BLD AUTO: 0.2 10*3/MM3 (ref 0–0.4)
EOSINOPHIL NFR BLD AUTO: 2.6 % (ref 0.3–6.2)
ERYTHROCYTE [DISTWIDTH] IN BLOOD BY AUTOMATED COUNT: 14.5 % (ref 12.3–15.4)
GFR SERPL CREATININE-BSD FRML MDRD: 77 ML/MIN/1.73
GLUCOSE SERPL-MCNC: 100 MG/DL (ref 65–99)
HCT VFR BLD AUTO: 31.5 % (ref 34–46.6)
HGB BLD-MCNC: 10.2 G/DL (ref 12–15.9)
LIPASE SERPL-CCNC: 29 U/L (ref 13–60)
LYMPHOCYTES # BLD AUTO: 1 10*3/MM3 (ref 0.7–3.1)
LYMPHOCYTES NFR BLD AUTO: 15.6 % (ref 19.6–45.3)
MCH RBC QN AUTO: 27.9 PG (ref 26.6–33)
MCHC RBC AUTO-ENTMCNC: 32.3 G/DL (ref 31.5–35.7)
MCV RBC AUTO: 86.4 FL (ref 79–97)
MONOCYTES # BLD AUTO: 0.7 10*3/MM3 (ref 0.1–0.9)
MONOCYTES NFR BLD AUTO: 9.9 % (ref 5–12)
NEUTROPHILS NFR BLD AUTO: 4.8 10*3/MM3 (ref 1.7–7)
NEUTROPHILS NFR BLD AUTO: 71.1 % (ref 42.7–76)
NRBC BLD AUTO-RTO: 0 /100 WBC (ref 0–0.2)
PLATELET # BLD AUTO: 314 10*3/MM3 (ref 140–450)
PMV BLD AUTO: 6.8 FL (ref 6–12)
POTASSIUM SERPL-SCNC: 4.3 MMOL/L (ref 3.5–5.2)
PROT SERPL-MCNC: 5.7 G/DL (ref 6–8.5)
RBC # BLD AUTO: 3.65 10*6/MM3 (ref 3.77–5.28)
SODIUM SERPL-SCNC: 139 MMOL/L (ref 136–145)
WBC # BLD AUTO: 6.7 10*3/MM3 (ref 3.4–10.8)

## 2021-07-07 PROCEDURE — 82150 ASSAY OF AMYLASE: CPT | Performed by: INTERNAL MEDICINE

## 2021-07-07 PROCEDURE — 83690 ASSAY OF LIPASE: CPT | Performed by: INTERNAL MEDICINE

## 2021-07-07 PROCEDURE — 43239 EGD BIOPSY SINGLE/MULTIPLE: CPT | Performed by: NURSE PRACTITIONER

## 2021-07-07 PROCEDURE — 80048 BASIC METABOLIC PNL TOTAL CA: CPT | Performed by: FAMILY MEDICINE

## 2021-07-07 PROCEDURE — 80076 HEPATIC FUNCTION PANEL: CPT | Performed by: INTERNAL MEDICINE

## 2021-07-07 PROCEDURE — 25010000002 PROPOFOL 10 MG/ML EMULSION: Performed by: NURSE ANESTHETIST, CERTIFIED REGISTERED

## 2021-07-07 PROCEDURE — 85025 COMPLETE CBC W/AUTO DIFF WBC: CPT | Performed by: FAMILY MEDICINE

## 2021-07-07 PROCEDURE — 88305 TISSUE EXAM BY PATHOLOGIST: CPT | Performed by: INTERNAL MEDICINE

## 2021-07-07 PROCEDURE — G0378 HOSPITAL OBSERVATION PER HR: HCPCS

## 2021-07-07 RX ORDER — ONDANSETRON 2 MG/ML
4 INJECTION INTRAMUSCULAR; INTRAVENOUS ONCE AS NEEDED
Status: DISCONTINUED | OUTPATIENT
Start: 2021-07-07 | End: 2021-07-07 | Stop reason: HOSPADM

## 2021-07-07 RX ORDER — SODIUM CHLORIDE 0.9 % (FLUSH) 0.9 %
3 SYRINGE (ML) INJECTION EVERY 12 HOURS SCHEDULED
Status: CANCELLED | OUTPATIENT
Start: 2021-07-07

## 2021-07-07 RX ORDER — ONDANSETRON 2 MG/ML
4 INJECTION INTRAMUSCULAR; INTRAVENOUS EVERY 6 HOURS PRN
Status: DISCONTINUED | OUTPATIENT
Start: 2021-07-07 | End: 2021-07-07 | Stop reason: HOSPADM

## 2021-07-07 RX ORDER — ONDANSETRON 4 MG/1
4 TABLET, FILM COATED ORAL EVERY 6 HOURS PRN
Status: DISCONTINUED | OUTPATIENT
Start: 2021-07-07 | End: 2021-07-07 | Stop reason: HOSPADM

## 2021-07-07 RX ORDER — SODIUM CHLORIDE 0.9 % (FLUSH) 0.9 %
3-10 SYRINGE (ML) INJECTION AS NEEDED
Status: CANCELLED | OUTPATIENT
Start: 2021-07-07

## 2021-07-07 RX ORDER — PANTOPRAZOLE SODIUM 40 MG/1
40 TABLET, DELAYED RELEASE ORAL DAILY
Qty: 30 TABLET | Refills: 3 | Status: SHIPPED | OUTPATIENT
Start: 2021-07-07

## 2021-07-07 RX ORDER — SODIUM CHLORIDE 9 MG/ML
INJECTION, SOLUTION INTRAVENOUS CONTINUOUS PRN
Status: DISCONTINUED | OUTPATIENT
Start: 2021-07-07 | End: 2021-07-07 | Stop reason: SURG

## 2021-07-07 RX ORDER — PROPOFOL 10 MG/ML
VIAL (ML) INTRAVENOUS AS NEEDED
Status: DISCONTINUED | OUTPATIENT
Start: 2021-07-07 | End: 2021-07-07 | Stop reason: SURG

## 2021-07-07 RX ORDER — LIDOCAINE HYDROCHLORIDE 10 MG/ML
INJECTION, SOLUTION EPIDURAL; INFILTRATION; INTRACAUDAL; PERINEURAL AS NEEDED
Status: DISCONTINUED | OUTPATIENT
Start: 2021-07-07 | End: 2021-07-07 | Stop reason: SURG

## 2021-07-07 RX ADMIN — PANTOPRAZOLE SODIUM 40 MG: 40 TABLET, DELAYED RELEASE ORAL at 06:00

## 2021-07-07 RX ADMIN — ACETAMINOPHEN 650 MG: 325 TABLET, FILM COATED ORAL at 08:30

## 2021-07-07 RX ADMIN — PROPOFOL 200 MG: 10 INJECTION, EMULSION INTRAVENOUS at 14:02

## 2021-07-07 RX ADMIN — HYDROXYCHLOROQUINE SULFATE 200 MG: 200 TABLET ORAL at 08:30

## 2021-07-07 RX ADMIN — SODIUM CHLORIDE: 0.9 INJECTION, SOLUTION INTRAVENOUS at 13:56

## 2021-07-07 RX ADMIN — LIDOCAINE HYDROCHLORIDE 50 MG: 10 INJECTION, SOLUTION EPIDURAL; INFILTRATION; INTRACAUDAL; PERINEURAL at 14:01

## 2021-07-07 RX ADMIN — MULTIPLE VITAMINS W/ MINERALS TAB 1 TABLET: TAB at 08:30

## 2021-07-07 NOTE — ANESTHESIA POSTPROCEDURE EVALUATION
Patient: Mariajose Tabor    Procedure Summary     Date: 07/07/21 Room / Location: [facility] ENDOSCOPY 1 / [facility] ENDOSCOPY    Anesthesia Start: 1356 Anesthesia Stop: 1413    Procedure: ESOPHAGOGASTRODUODENOSCOPY with gastric biopsy x 2 (N/A ) Diagnosis:       Abdominal pain, unspecified abdominal location      (Abdominal pain, unspecified abdominal location [R10.9])    Surgeons: Ana Muñiz MD Provider: Maximiliano Berman DO    Anesthesia Type: MAC ASA Status: 3          Anesthesia Type: MAC    Vitals  Vitals Value Taken Time   /59 07/07/21 1446   Temp     Pulse 80 07/07/21 1448   Resp 19 07/07/21 1429   SpO2 95 % 07/07/21 1448   Vitals shown include unvalidated device data.        Post Anesthesia Care and Evaluation    Patient location during evaluation: PACU  Patient participation: complete - patient participated  Level of consciousness: awake  Pain scale: See nurse's notes for pain score.  Pain management: adequate  Airway patency: patent  Anesthetic complications: No anesthetic complications  PONV Status: none  Cardiovascular status: acceptable  Respiratory status: acceptable  Hydration status: acceptable    Comments: Patient seen and examined postoperatively; vital signs stable; SpO2 greater than or equal to 90%; cardiopulmonary status stable; nausea/vomiting adequately controlled; pain adequately controlled; no apparent anesthesia complications; patient discharged from anesthesia care when discharge criteria were met

## 2021-07-07 NOTE — PROGRESS NOTES
LOS: 0 days   Patient Care Team:  Ke Talbot MD as PCP - General (Family Medicine)    Subjective:  She feels better overall    Objective:   Afebrile//bowel movement last evening      Review of Systems:   Review of Systems   Constitutional: Positive for activity change and appetite change.   Respiratory: Negative.    Cardiovascular: Negative.    Gastrointestinal: Positive for abdominal pain. Negative for abdominal distention.   Musculoskeletal: Negative.            Vital Signs  Temp:  [98 °F (36.7 °C)-99 °F (37.2 °C)] 98 °F (36.7 °C)  Heart Rate:  [71-75] 72  Resp:  [14-16] 14  BP: (113-151)/(48-69) 125/69    Physical Exam:  Physical Exam  Vitals and nursing note reviewed.   Constitutional:       Appearance: Normal appearance.   Pulmonary:      Effort: Pulmonary effort is normal.   Abdominal:      General: There is no distension.      Palpations: There is no mass.      Tenderness: There is abdominal tenderness. There is no guarding or rebound.      Hernia: No hernia is present.   Skin:     General: Skin is warm.   Neurological:      General: No focal deficit present.      Mental Status: She is alert and oriented to person, place, and time.          Radiology:  CT Abdomen Pelvis With Contrast    Result Date: 7/6/2021  1.  No acute abnormalities are identified. 2.  The patient has had interval surgery with a new right-sided colonic anastomosis. 3.  No bowel wall thickening, obstruction or inflammation. 4.  Colonic diverticulosis without diverticulitis. 5.  Renal cysts. Electronically signed by:  Yaya Conley M.D.  7/6/2021 12:30 AM         Results Review:     I reviewed the patient's new clinical results.  I reviewed the patient's new imaging results and agree with the interpretation.    Medication Review:   Scheduled Meds:hydroxychloroquine, 200 mg, Oral, BID  lisinopril, 20 mg, Oral, Q24H  multivitamin with minerals, 1 tablet, Oral, Daily  pantoprazole, 40 mg, Oral, Q AM      Continuous  Infusions:sodium chloride, 100 mL/hr, Last Rate: 100 mL/hr (07/06/21 2000)      PRN Meds:.•  acetaminophen  •  hydrALAZINE  •  HYDROmorphone  •  magnesium sulfate **OR** magnesium sulfate in D5W 1g/100mL (PREMIX)  •  melatonin  •  ondansetron  •  potassium chloride **OR** potassium chloride **OR** potassium chloride  •  [COMPLETED] Insert peripheral IV **AND** sodium chloride    Labs:    CBC    Results from last 7 days   Lab Units 07/07/21  0037 07/06/21 0515 07/06/21  0109   WBC 10*3/mm3 6.70 6.50 6.30   HEMOGLOBIN g/dL 10.2* 11.2* 11.4*   PLATELETS 10*3/mm3 314 341 350     BMP   Results from last 7 days   Lab Units 07/07/21 0037 07/06/21 0515 07/06/21  0109   SODIUM mmol/L 139 140 138   POTASSIUM mmol/L 4.3 4.5 4.4   CHLORIDE mmol/L 106 102 101   CO2 mmol/L 24.0 28.0 28.0   BUN mg/dL 11 11 13   CREATININE mg/dL 0.75 0.94 1.01*   GLUCOSE mg/dL 100* 104* 111*     Cr Clearance Estimated Creatinine Clearance: 59.4 mL/min (by C-G formula based on SCr of 0.75 mg/dL).  Coag     HbA1C No results found for: HGBA1C  Blood Glucose No results found for: POCGLU  Infection     CMP   Results from last 7 days   Lab Units 07/07/21 0037 07/06/21 0515 07/06/21  0109   SODIUM mmol/L 139 140 138   POTASSIUM mmol/L 4.3 4.5 4.4   CHLORIDE mmol/L 106 102 101   CO2 mmol/L 24.0 28.0 28.0   BUN mg/dL 11 11 13   CREATININE mg/dL 0.75 0.94 1.01*   GLUCOSE mg/dL 100* 104* 111*   ALBUMIN g/dL 3.40* 4.00 4.30   BILIRUBIN mg/dL 0.2 0.2 0.2   ALK PHOS U/L 73 75 82   AST (SGOT) U/L 18 22 22   ALT (SGPT) U/L 9 10 10   AMYLASE U/L 77  --   --    LIPASE U/L 29  --  21     UA    Results from last 7 days   Lab Units 07/06/21  0109   NITRITE UA  Negative     Radiology(recent) CT Abdomen Pelvis With Contrast    Result Date: 7/6/2021  1.  No acute abnormalities are identified. 2.  The patient has had interval surgery with a new right-sided colonic anastomosis. 3.  No bowel wall thickening, obstruction or inflammation. 4.  Colonic diverticulosis  without diverticulitis. 5.  Renal cysts. Electronically signed by:  Yaya Conley M.D.  7/6/2021 12:30 AM     Assessment:    Acute abdominal pain  Status post elective exploratory laparotomy with right colon resection 3/2/2021  Acute pneumoperitoneum  Status post elective colonoscopy with polypectomy of a 3.5 cm polyp 3/1/2021  Postoperative anemia secondary to acute losses and distributive phenomenon  Primary essential hypertension  Gastroesophageal reflux disease without esophagitis  Perennial allergic rhinitis  Degenerative disc disease cervical spine  Raynaud's disease  History of mixed migraine headaches    Plan:  Elective EGD today with anticipated initiation of clear liquid diet        Ke Talbot MD  07/07/21  08:41 EDT

## 2021-07-07 NOTE — ANESTHESIA PREPROCEDURE EVALUATION
Anesthesia Evaluation     Patient summary reviewed and Nursing notes reviewed   NPO Solid Status: > 6 hours  NPO Liquid Status: > 6 hours           Airway   Mallampati: I  TM distance: >3 FB  Neck ROM: full  No difficulty expected  Dental - normal exam     Pulmonary - normal exam   (+) a smoker Former,   Cardiovascular - normal exam    (+) hypertension well controlled 2 medications or greater,       Neuro/Psych- negative ROS  GI/Hepatic/Renal/Endo - negative ROS     Musculoskeletal (-) negative ROS    Abdominal  - normal exam    Bowel sounds: normal.   Substance History - negative use     OB/GYN negative ob/gyn ROS         Other                      Anesthesia Plan    ASA 3     MAC   total IV anesthesia  intravenous induction     Anesthetic plan, all risks, benefits, and alternatives have been provided, discussed and informed consent has been obtained with: patient.    Plan discussed with CAA and CRNA.

## 2021-07-07 NOTE — CONSULTS
"GI CONSULT  NOTE:    Referring Provider:  Dr. Talbot    Chief complaint: Abdominal pain    Subjective . \"  Started having pain 2 days ago that was constant\"    History of present illness: Mariajose Tabor is a 71 y.o. female who has a history of right colon resection s/p perforation and umbilical hernia repair.  She presents with acute onset abdominal pain that started Monday evening that was epigastric and radiated diffusely with cramping.  It was constant for several hours which brought her to the hospital.  She denies history of similar issues.  Mild nausea but no vomiting.  History of chronic constipation but somewhat more regular since her colon resection earlier this year.  She was going daily but over the last 2 weeks has had hard stool every other day.  No heartburn on daily omeprazole and NSAID use is rare.  Difficulty swallowing rare.  No unintentional weight loss.  No recent travel.  No fevers or chills.  Pain significantly better today.      Endo History:  She reports EGD years ago with reflux  3/2021 colonoscopy (Dr. Gamez) -adenomatous polyp    Past Medical History:  Past Medical History:   Diagnosis Date   • GERD (gastroesophageal reflux disease)    • Hypertension    • Migraine        Past Surgical History:  Past Surgical History:   Procedure Laterality Date   • CATARACT EXTRACTION     • COLONOSCOPY     • DILATATION AND CURETTAGE     • EXPLORATORY LAPAROTOMY N/A 3/2/2021    Procedure: LAPAROTOMY EXPLORATORY with RIGHT COLON RESECTION;  Surgeon: Pedro Wren DO;  Location: [facility] MAIN OR;  Service: General;  Laterality: N/A;   • HERNIA REPAIR     • THUMB ARTHROSCOPY Bilateral     thumb fusion       Social History:  Social History     Tobacco Use   • Smoking status: Former Smoker     Types: Cigarettes     Quit date: 3/22/2011     Years since quitting: 10.3   • Smokeless tobacco: Never Used   Substance Use Topics   • Alcohol use: Yes     Alcohol/week: 7.0 standard drinks     Types: 7 Shots of liquor " per week   • Drug use: Never   Some alcohol use, history of tobacco use    Family History:  Uncle with possible colon cancer    Medications:  Medications Prior to Admission   Medication Sig Dispense Refill Last Dose   • amitriptyline (ELAVIL) 25 MG tablet Take 25 mg by mouth Every Night.      • aspirin 81 MG EC tablet Take 81 mg by mouth Daily.      • benazepril (LOTENSIN) 20 MG tablet Take 20 mg by mouth Daily.      • Biotin 10 MG capsule Take  by mouth.      • calcium carbonate (OS-LILIANA) 600 MG tablet Take 600 mg by mouth Daily.      • clobetasol (TEMOVATE) 0.05 % cream Apply  topically to the appropriate area as directed Every Night. Scalp      • hydroxychloroquine (PLAQUENIL) 200 MG tablet Take 200 mg by mouth 2 (Two) Times a Day.      • meloxicam (MOBIC) 15 MG tablet Take 15 mg by mouth Daily.      • Multiple Vitamins-Minerals (PRESERVISION AREDS 2 PO) Take 2 tablets by mouth Daily.      • NON FORMULARY / PATIENT SUPPLIED MEDICATION Insert 2 mL into the vagina 2 (Two) Times a Week. Estriol 0.5% cream-   Insert 2ml twice weekly on Tuesdays and Fridays      • Omega-3 Fatty Acids (fish oil) 1000 MG capsule capsule Take  by mouth Daily With Breakfast.      • omeprazole (priLOSEC) 40 MG capsule Take 40 mg by mouth Daily.      • vitamin C (ASCORBIC ACID) 250 MG tablet Take 250 mg by mouth Daily.          Scheduled Meds:hydroxychloroquine, 200 mg, Oral, BID  lisinopril, 20 mg, Oral, Q24H  pantoprazole, 40 mg, Oral, Q AM      Continuous Infusions:sodium chloride, 100 mL/hr, Last Rate: 100 mL/hr (07/06/21 2000)      PRN Meds:.•  acetaminophen  •  hydrALAZINE  •  HYDROmorphone  •  magnesium sulfate **OR** magnesium sulfate in D5W 1g/100mL (PREMIX)  •  melatonin  •  ondansetron  •  potassium chloride **OR** potassium chloride **OR** potassium chloride  •  [COMPLETED] Insert peripheral IV **AND** sodium chloride    ALLERGIES:  Patient has no known allergies.    ROS:  The following systems were reviewed  Constitution:  No  fevers, chills, no unintentional weight loss  Skin: no rash, no jaundice  Eyes:  No blurry vision, no eye pain  HENT:  No change in hearing or smell  Resp:  No dyspnea or cough  CV:  No chest pain or palpitations  :  No dysuria, hematuria  Musculoskeletal:  No leg cramps or arthralgias  Neuro:  No tremor, no numbness  Psych:  No depression or confusion    Objective Resting in bed, no acute distress, no family present    Vital Signs:   Vitals:    07/06/21 2001 07/07/21 0003 07/07/21 0431 07/07/21 0812   BP: 137/65 113/62 127/67 125/69   BP Location: Right arm Right arm Right arm Left arm   Patient Position: Lying Lying Lying Lying   Pulse: 72 72 71 72   Resp: 16 16 16 14   Temp: 99 °F (37.2 °C) 98.1 °F (36.7 °C) 98.3 °F (36.8 °C) 98 °F (36.7 °C)   TempSrc: Oral Oral Oral Oral   SpO2: 95% 95% 97% 99%   Weight:       Height:           Physical Exam:       General Appearance:    Awake and alert, in no acute distress   Head:    Normocephalic, without obvious abnormality, atraumatic   Throat:   No oral lesions, no thrush, oral mucosa moist   Lungs:     Respirations regular, even and unlabored   Chest Wall:    No abnormalities observed   Abdomen:     Soft, mild epigastric tenderness without rebound or guarding, nondistended   Rectal:     Deferred   Extremities:   Moves all extremities, no edema, no cyanosis   Pulses:   Pulses palpable and equal bilaterally   Skin:   No rash, no jaundice, normal palpation       Neurologic:   Cranial nerves 2 - 12 grossly intact       Results Review:   I reviewed the patient's labs and imaging.  CBC    Results from last 7 days   Lab Units 07/07/21  0037 07/06/21  0515 07/06/21  0109   WBC 10*3/mm3 6.70 6.50 6.30   HEMOGLOBIN g/dL 10.2* 11.2* 11.4*   PLATELETS 10*3/mm3 314 341 350     CMP   Results from last 7 days   Lab Units 07/07/21  0037 07/06/21  0515 07/06/21  0109   SODIUM mmol/L 139 140 138   POTASSIUM mmol/L 4.3 4.5 4.4   CHLORIDE mmol/L 106 102 101   CO2 mmol/L 24.0 28.0 28.0    BUN mg/dL 11 11 13   CREATININE mg/dL 0.75 0.94 1.01*   GLUCOSE mg/dL 100* 104* 111*   ALBUMIN g/dL 3.40* 4.00 4.30   BILIRUBIN mg/dL 0.2 0.2 0.2   ALK PHOS U/L 73 75 82   AST (SGOT) U/L 18 22 22   ALT (SGPT) U/L 9 10 10   AMYLASE U/L 77  --   --    LIPASE U/L 29  --  21     Cr Clearance Estimated Creatinine Clearance: 59.4 mL/min (by C-G formula based on SCr of 0.75 mg/dL).  Coag     HbA1C No results found for: HGBA1C  Blood Glucose No results found for: POCGLU  Infection     UA    Results from last 7 days   Lab Units 07/06/21  0109   NITRITE UA  Negative     Radiology(recent) CT Abdomen Pelvis With Contrast    Result Date: 7/6/2021  1.  No acute abnormalities are identified. 2.  The patient has had interval surgery with a new right-sided colonic anastomosis. 3.  No bowel wall thickening, obstruction or inflammation. 4.  Colonic diverticulosis without diverticulitis. 5.  Renal cysts. Electronically signed by:  Yaya Conley M.D.  7/6/2021 12:30 AM         ASSESSMENT:  Acute epigastric pain  Chronic constipation  Normocytic anemia  History of right colon resection  History of umbilical hernia repair    PLAN:  Patient presents with acute onset epigastric pain that started 2 days ago without obvious trigger.  Mild nausea.  LFTs and lipase normal.  CT of the abdomen without acute changes.  Continue PPI we will proceed with EGD evaluation today.  She does report chronic constipation which could be contributing to her pain.  Would recommend increased water intake and Benefiber daily.  If constipation persist, consider adding MiraLAX as well.  Continue supportive care and further recommendations to follow EGD findings.    I discussed the patients findings and my recommendations with the patient.    We appreciate the referral    Carisa Miranda, YAS  07/07/21  10:30 EDT

## 2021-07-07 NOTE — PLAN OF CARE
Goal Outcome Evaluation:  Plan of Care Reviewed With: patient           Outcome Summary: Pt's pain has been under control for the most part tonight. VSS. Pt resting well. Will continue to monitor.

## 2021-07-07 NOTE — PLAN OF CARE
Problem: Adult Inpatient Plan of Care  Goal: Plan of Care Review  Outcome: Ongoing, Progressing  Goal: Patient-Specific Goal (Individualized)  Outcome: Ongoing, Progressing  Goal: Absence of Hospital-Acquired Illness or Injury  Outcome: Ongoing, Progressing  Intervention: Identify and Manage Fall Risk  Description: Perform standard risk assessment with a validated tool or comprehensive approach appropriate to the patient on admission; reassess fall risk frequently, with change in status or transfer to another level of care.  Communicate fall injury risk to interprofessional healthcare team.  Determine need for increased observation, equipment and environmental modification, such as low bed and signage, as well as supportive, nonskid footwear.  Adjust safety measures to individual developmental age, stage and identified risk factors.  Reinforce the importance of safety and physical activity with patient and family.  Perform regular intentional rounding to assess need for position change, pain assessment, personal needs, including assistance with toileting.  Recent Flowsheet Documentation  Taken 7/7/2021 1039 by Felicia Barrios LPN  Safety Promotion/Fall Prevention:   safety round/check completed   nonskid shoes/slippers when out of bed   fall prevention program maintained  Taken 7/7/2021 0900 by Felicia Barrios LPN  Safety Promotion/Fall Prevention:   safety round/check completed   nonskid shoes/slippers when out of bed   fall prevention program maintained  Taken 7/7/2021 0812 by Felicia Barrios LPN  Safety Promotion/Fall Prevention:   safety round/check completed   nonskid shoes/slippers when out of bed   fall prevention program maintained  Taken 7/7/2021 0722 by Felicia Barrios LPN  Safety Promotion/Fall Prevention:   safety round/check completed   nonskid shoes/slippers when out of bed   fall prevention program maintained  Intervention: Prevent Skin Injury  Description: Assess skin risk on admission and at  regular intervals throughout hospital stay.  Keep all areas of skin (especially folds) clean and dry.  Maintain adequate skin hydration.  Relieve and redistribute pressure and protect bony prominences; implement measures based on patient-specific risk factors.  Match turning and repositioning schedule to clinical condition.  Encourage weight shift frequently; assist with reposition if unable to complete independently.  Float heels off bed. Avoid pressure on the Achilles tendon.  Keep skin free from extended contact with medical devices.  Use aids (e.g., slide boards, mechanical lift) during transfer.  Recent Flowsheet Documentation  Taken 7/7/2021 0722 by Felicia Barrios LPN  Body Position: position changed independently  Intervention: Prevent and Manage VTE (venous thromboembolism) Risk  Description: Assess for VTE risk.  Encourage/assist with early ambulation.  Initiate and maintain compression or other therapy, as indicated based on identified risk in accordance with organizational protocol/provider order.  Encourage both active and passive leg exercises while in bed, if unable to ambulate.  Recent Flowsheet Documentation  Taken 7/7/2021 0722 by Felicia Barrios LPN  VTE Prevention/Management:   bilateral   sequential compression devices off  Intervention: Prevent Infection  Description: Maintain skin and mucous membrane integrity; promote hand, oral and pulmonary hygiene.  Optimize fluid balance, nutrition, sleep and glycemic control to maximize infection resistance.  Identify potential sources of infection early to prevent or mitigate progression of infection (e.g., wound, lines, devices).  Evaluate ongoing need for invasive devices; remove promptly when no longer indicated.  Recent Flowsheet Documentation  Taken 7/7/2021 0722 by Felicia Barrios LPN  Infection Prevention:   rest/sleep promoted   single patient room provided  Goal: Optimal Comfort and Wellbeing  Outcome: Ongoing, Progressing  Goal: Readiness  for Transition of Care  Outcome: Ongoing, Progressing   Goal Outcome Evaluation:         Patient observed with eyes open in sitting position in chair. Rise and fall of chest observed. Dressings clean dry and intact. Hourly rounding completed this shift, no complaints or needs voiced.

## 2021-07-07 NOTE — OP NOTE
ESOPHAGOGASTRODUODENOSCOPY Procedure Report    Patient Name:  Mariajose Tabor  YOB: 1950    Date of Surgery:  7/7/2021     Pre-Op Diagnosis:  Abdominal pain, unspecified abdominal location [R10.9]       Post-Op Diagnosis Codes:     * Abdominal pain, unspecified abdominal location [R10.9]   Gastric nodule      Procedure/CPT® Codes:      Procedure(s):  ESOPHAGOGASTRODUODENOSCOPY with gastric biopsy x 2    Staff:  Surgeon(s):  Ana Muñiz MD         Anesthesia: Monitored Anesthesia Care    Implants:    Nothing was implanted during the procedure    Specimen:        See Below    No blood loss    Complications:  None     Description of Procedure:  Informed consent was obtained for the procedure, including sedation.  Risks of perforation, hemorrhage, adverse drug reaction and aspiration were discussed.  The patient was brought into the endoscopy suite. Continuous cardiopulmonary monitoring was performed. The patient was placed in the left lateral decubitus position.  The bite block was inserted into the patient's mouth. After adequate sedation was attained, the Olympus gastroscope was inserted into the patient's mouth and advanced to the second portion of the duodenum without difficulty.  Circumferential examination was performed. A retroflex exam was performed in the patient's stomach.  On completion of the exam, the bowel was decompressed, the scope was removed from the patient, the patient tolerated the procedure well, there were no immediate post-operative complications.     Examination of the esophagus showed normal mucosa.  There was a small hiatal hernia.  Examination of the stomach showed single inflammatory appearing gastric nodule, 6 to 7 mm in size, located in the gastric body.  Biopsies were obtained and sent for pathology.  Additional biopsies were obtained from gastric antrum and body and sent for his pathology by for H. pylori given the patient's history of epigastric  pain.  Retroflex examination of the stomach was normal   Examination of the duodenum showed normal mucosa      Impression:  Gastric nodule  Small hiatal hernia    Recommendations:  Follow-up histopathology  Treat for H. pylori if positive  Continue outpatient PPI for history of GERD  Follow-up in the office  Okay for diet discharge, GI will see as needed      Ana Muñiz MD     Date: 7/7/2021  Time: 14:09 EDT

## 2021-07-07 NOTE — CASE MANAGEMENT/SOCIAL WORK
Continued Stay Note  ЕЛЕНА Molina     Patient Name: Mariajose Tabor  MRN: [id number]  Today's Date: 7/7/2021    Admit Date: 7/6/2021    Discharge Plan     Row Name 07/07/21 1152       Plan    Plan  D/C Plan: Home with family.    Plan Comments  Barrier to D/C: EGD today.          Expected Discharge Date and Time     Expected Discharge Date Expected Discharge Time    Jul 7, 2021             Lis Nelson

## 2021-07-08 NOTE — DISCHARGE SUMMARY
Date of Discharge:  7/8/2021    Discharge Diagnosis:    Acute abdominal pain likely secondary to small bowel dysmotility secondary to intra-abdominal adhesions  Status post elective EGD 7/7/2021  Hiatal hernia  Inflammatory gastric nodule status post biopsies via EGD 7/7/2021  Status post elective exploratory laparotomy with right colon resection 3/2/2021  Acute pneumoperitoneum  Status post elective colonoscopy with polypectomy of a 3.5 cm polyp 3/1/2021  Postoperative anemia secondary to acute losses and distributive phenomenon  Primary essential hypertension  Gastroesophageal reflux disease without esophagitis  Perennial allergic rhinitis  Degenerative disc disease cervical spine  Raynaud's disease  History of mixed migraine headaches    Presenting Problem/History of Present Illness  Active Hospital Problems    Diagnosis  POA   • **Abdominal pain [R10.9]  Yes      Resolved Hospital Problems   No resolved problems to display.          Hospital Course  Patient is a 71 y.o. female who presented with acute abdominal pain.  The patient received bowel rest and parenteral pain control measures and was evaluated by surgery and gastroenterology.  She underwent EGD and was found to have an inflammatory gastric nodule which was biopsied.  She did receive PPI therapy and was found to feel better and to be able to tolerate food and she was deemed stable for discharge home for close outpatient follow-up with us within 1 week and with gastroenterology within 1 month.  There were no other complications throughout the patient's hospital stay.  We will hold aspirin and nonsteroidal anti-inflammatory therapy until such time as we reevaluate her in the office.    Procedures Performed    Procedure(s):  ESOPHAGOGASTRODUODENOSCOPY with gastric biopsy x 2  -------------------       Consults:   Consults     Date and Time Order Name Status Description    7/6/2021  4:51 PM Inpatient Gastroenterology Consult Completed           Pertinent  Test Results:CT Abdomen Pelvis With Contrast    Result Date: 7/6/2021  1.  No acute abnormalities are identified. 2.  The patient has had interval surgery with a new right-sided colonic anastomosis. 3.  No bowel wall thickening, obstruction or inflammation. 4.  Colonic diverticulosis without diverticulitis. 5.  Renal cysts. Electronically signed by:  Yaya Conley M.D.  7/6/2021 12:30 AM      Imaging Results (Last 7 Days)     Procedure Component Value Units Date/Time    CT Abdomen Pelvis With Contrast [[id number]] Collected: 07/06/21 0216     Updated: 07/06/21 0231    Narrative:      EXAMINATION: CT SCAN OF THE ABDOMEN AND PELVIS WITH INTRAVENOUS CONTRAST    DATE OF EXAM: 7/6/2021 1:58 AM    HISTORY: Abdominal pain    COMPARISON: 3/1/2021.    TECHNIQUE: CT examination of the abdomen and pelvis was performed following the intravenous administration of 100 mL  Isovue-370. CT dose lowering techniques were used, to include: automated exposure control, adjustment for patient size, and/or use of   iterative reconstruction.    FINDINGS:    ABDOMEN/PELVIS:    Lower Chest: Normal.     Liver: Normal.     Gallbladder/Biliary: Normal.     Pancreas: Normal.     Spleen: Normal.     Adrenal Glands: Normal.     Kidneys: Renal cysts.    GI Tract: The patient has had interval bowel surgery with a right sided colonic anastomosis. There is no bowel wall thickening, obstruction, inflammation or other acute abnormality identified. Colonic diverticulosis without diverticulitis    Mesentery/Peritoneum: Normal.    Vasculature: Normal.     Lymph Nodes: Normal.     Abdominal Wall: Normal.     Bladder: Normal.     Reproductive: Normal.     Musculoskeletal: Normal.        Impression:        1.  No acute abnormalities are identified.  2.  The patient has had interval surgery with a new right-sided colonic anastomosis.  3.  No bowel wall thickening, obstruction or inflammation.  4.  Colonic diverticulosis without diverticulitis.  5.  Renal  cysts.    Electronically signed by:  Yaya Conley M.D.    7/6/2021 12:30 AM              Condition on Discharge:  Fair    Vital Signs  Temp:  [98.1 °F (36.7 °C)-98.5 °F (36.9 °C)] 98.1 °F (36.7 °C)  Heart Rate:  [76-96] 76  Resp:  [10-19] 15  BP: (120-155)/(32-69) 136/56    Physical Exam:     General Appearance:    Alert, cooperative, in no acute distress   Head:    Normocephalic, without obvious abnormality, atraumatic   Eyes:           Conjunctivae and sclerae normal, no   icterus, no pallor, corneas clear, PERRLA   Throat:   No oral lesions, no thrush, oral mucosa moist   Neck:   No adenopathy, supple, trachea midline, no thyromegaly, no   carotid bruit, no JVD   Lungs:     Clear to auscultation,respirations regular, even and                  unlabored    Heart:    Regular rhythm and normal rate, normal S1 and S2, no            murmur, no gallop, no rub, no click   Chest Wall:    No abnormalities observed   Abdomen:     Normal bowel sounds, no masses, no organomegaly, soft        non-tender, non-distended, no guarding, no rebound                tenderness   Rectal:     Deferred   Extremities:   Moves all extremities well, no edema, no cyanosis, no             redness   Pulses:   Pulses palpable and equal bilaterally   Skin:   No bleeding, bruising or rash   Lymph nodes:   No palpable adenopathy   Neurologic:   Cranial nerves 2 - 12 grossly intact, sensation intact, DTR       present and equal bilaterally         Discharge Disposition  Home or Self Care    Discharge Medications     Discharge Medications      New Medications      Instructions Start Date   pantoprazole 40 MG EC tablet  Commonly known as: PROTONIX   40 mg, Oral, Daily         Continue These Medications      Instructions Start Date   amitriptyline 25 MG tablet  Commonly known as: ELAVIL   25 mg, Oral, Nightly      benazepril 20 MG tablet  Commonly known as: LOTENSIN   20 mg, Oral, Daily      Biotin 10 MG capsule   Oral      calcium carbonate  600 MG tablet  Commonly known as: OS-LILIANA   600 mg, Oral, Daily      clobetasol 0.05 % cream  Commonly known as: TEMOVATE   Topical, Nightly, Scalp       hydroxychloroquine 200 MG tablet  Commonly known as: PLAQUENIL   200 mg, Oral, 2 Times Daily      NON FORMULARY / PATIENT SUPPLIED MEDICATION   2 mL, Vaginal, 2 Times Weekly, Estriol 0.5% cream-   Insert 2ml twice weekly on Tuesdays and Fridays       PRESERVISION AREDS 2 PO   2 tablets, Oral, Daily      vitamin C 250 MG tablet  Commonly known as: ASCORBIC ACID   250 mg, Oral, Daily         Stop These Medications    aspirin 81 MG EC tablet     fish oil 1000 MG capsule capsule     meloxicam 15 MG tablet  Commonly known as: MOBIC     omeprazole 40 MG capsule  Commonly known as: priLOSEC            Discharge Diet:   Diet Instructions     Low residue advance to regular.              Activity at Discharge:   As tolerated  Follow-up Appointments  Refer to the body of discharge summary      Test Results Pending at Discharge  Pending Labs     Order Current Status    Tissue Pathology Exam Collected (07/07/21 3744)           Ke Talbot MD  07/08/21  08:56 EDT

## 2021-07-08 NOTE — CASE MANAGEMENT/SOCIAL WORK
Case Management Discharge Note                Selected Continued Care - Discharged on 7/7/2021 Admission date: 7/6/2021 - Discharge disposition: Home or Self Care     Final Discharge Disposition Code: 01 - home or self-care

## 2021-07-09 LAB
LAB AP CASE REPORT: NORMAL
PATH REPORT.FINAL DX SPEC: NORMAL
PATH REPORT.GROSS SPEC: NORMAL

## 2021-07-20 ENCOUNTER — OFFICE (OUTPATIENT)
Dept: URBAN - METROPOLITAN AREA CLINIC 64 | Facility: CLINIC | Age: 71
End: 2021-07-20

## 2021-07-20 VITALS
HEIGHT: 63 IN | DIASTOLIC BLOOD PRESSURE: 71 MMHG | HEART RATE: 91 BPM | SYSTOLIC BLOOD PRESSURE: 129 MMHG | WEIGHT: 139 LBS

## 2021-07-20 DIAGNOSIS — K21.9 GASTRO-ESOPHAGEAL REFLUX DISEASE WITHOUT ESOPHAGITIS: ICD-10-CM

## 2021-07-20 DIAGNOSIS — R10.13 EPIGASTRIC PAIN: ICD-10-CM

## 2021-07-20 PROCEDURE — 99213 OFFICE O/P EST LOW 20 MIN: CPT | Performed by: NURSE PRACTITIONER

## 2021-09-28 ENCOUNTER — DOCUMENTATION (OUTPATIENT)
Dept: PHYSICAL THERAPY | Facility: CLINIC | Age: 71
End: 2021-09-28

## 2021-09-28 NOTE — PROGRESS NOTES
Discharge Summary  Discharge Summary from Physical Therapy Report      Number of Visits: 11     Goals: All Met    Discharge Plan: Continue with current home exercise program as instructed    Date of Discharge 9/28/21        Palmer Almendarez PT  Physical Therapist

## 2022-06-09 NOTE — PROGRESS NOTES
LOS: 2 days   Patient Care Team:  Didi Talbot MD as PCP - General  Didi Talbot MD as PCP - Family Medicine    Reason for follow-up: Postop    Subjective   Patient seen and examined.  Ongoing General improvement.  No significant nausea or vomiting getting tired to clear liquid diet.  Small amount of flatus.    Objective   Abdomen is soft and appropriately tender incision healing without any significant erythema or drainage TIKA drain is appropriate.    Vital Signs  Vitals:    03/04/21 1900 03/05/21 0552 03/05/21 1248 03/05/21 1620   BP: 132/71 124/63 135/76 138/69   BP Location: Right arm Left arm Left arm Left arm   Patient Position: Lying Lying Lying Sitting   Pulse: 87 78 84 80   Resp: 15 16 16 18   Temp: 98.4 °F (36.9 °C) 98.4 °F (36.9 °C) 97.8 °F (36.6 °C) 97.3 °F (36.3 °C)   TempSrc: Oral Oral Oral Oral   SpO2: 95% 95% 95% 98%   Weight:       Height:             Results Review:       Lab Results (last 24 hours)     Procedure Component Value Units Date/Time    Blood Culture - Blood, Hand, Right [[id number]] Collected: 03/02/21 0723    Specimen: Blood from Hand, Right Updated: 03/05/21 0730     Blood Culture No growth at 3 days    Blood Culture - Blood, Arm, Right [[id number]] Collected: 03/02/21 0722    Specimen: Blood from Arm, Right Updated: 03/05/21 0730     Blood Culture No growth at 3 days    Basic Metabolic Panel [[id number]]  (Abnormal) Collected: 03/05/21 0313    Specimen: Blood Updated: 03/05/21 0419     Glucose 91 mg/dL      BUN 7 mg/dL      Creatinine 0.58 mg/dL      Sodium 137 mmol/L      Potassium 3.5 mmol/L      Chloride 102 mmol/L      CO2 27.0 mmol/L      Calcium 7.9 mg/dL      eGFR Non African Amer 103 mL/min/1.73      BUN/Creatinine Ratio 12.1     Anion Gap 8.0 mmol/L     Narrative:      GFR Normal >60  Chronic Kidney Disease <60  Kidney Failure <15      CBC & Differential [[id number]]  (Abnormal) Collected: 03/05/21 0313    Specimen: Blood Updated: 03/05/21 0400    Narrative:      The  following orders were created for panel order CBC & Differential.  Procedure                               Abnormality         Status                     ---------                               -----------         ------                     CBC Auto Differential[[id number]]        Abnormal            Final result                 Please view results for these tests on the individual orders.    CBC Auto Differential [[id number]]  (Abnormal) Collected: 03/05/21 0313    Specimen: Blood Updated: 03/05/21 0400     WBC 6.00 10*3/mm3      RBC 3.15 10*6/mm3      Hemoglobin 9.9 g/dL      Hematocrit 29.0 %      MCV 92.2 fL      MCH 31.4 pg      MCHC 34.1 g/dL      RDW 14.5 %      RDW-SD 47.3 fl      MPV 6.7 fL      Platelets 265 10*3/mm3      Neutrophil % 80.2 %      Lymphocyte % 9.0 %      Monocyte % 8.1 %      Eosinophil % 2.5 %      Basophil % 0.2 %      Neutrophils, Absolute 4.80 10*3/mm3      Lymphocytes, Absolute 0.50 10*3/mm3      Monocytes, Absolute 0.50 10*3/mm3      Eosinophils, Absolute 0.20 10*3/mm3      Basophils, Absolute 0.00 10*3/mm3      nRBC 0.0 /100 WBC            Imaging Results (Last 24 Hours)     ** No results found for the last 24 hours. **          Medication Review:     Assessment/Plan         Generalized abdominal pain    Impression: Postop day #3 exploratory laparotomy with right colon resection with ileocolic anastomosis for perforated ascending colon from polypectomy.  Mild postop ileus    Plan:     Okay for full liquid diet  Management once bowel function improves          Anthony Gauthier MD  03/05/21  16:39 EST           normal...

## 2022-08-16 PROBLEM — Z86.010 PERSONAL HISTORY OF COLONIC POLYPS: Status: ACTIVE | Noted: 2021-03-01

## 2022-08-23 ENCOUNTER — ON CAMPUS - OUTPATIENT (OUTPATIENT)
Dept: URBAN - METROPOLITAN AREA HOSPITAL 2 | Facility: HOSPITAL | Age: 72
End: 2022-08-23
Payer: MEDICARE

## 2022-08-23 VITALS
HEART RATE: 93 BPM | SYSTOLIC BLOOD PRESSURE: 132 MMHG | DIASTOLIC BLOOD PRESSURE: 53 MMHG | DIASTOLIC BLOOD PRESSURE: 58 MMHG | SYSTOLIC BLOOD PRESSURE: 120 MMHG | SYSTOLIC BLOOD PRESSURE: 97 MMHG | SYSTOLIC BLOOD PRESSURE: 123 MMHG | OXYGEN SATURATION: 100 % | HEART RATE: 81 BPM | DIASTOLIC BLOOD PRESSURE: 65 MMHG | DIASTOLIC BLOOD PRESSURE: 61 MMHG | HEART RATE: 90 BPM | WEIGHT: 148 LBS | OXYGEN SATURATION: 97 % | HEART RATE: 96 BPM | RESPIRATION RATE: 15 BRPM | SYSTOLIC BLOOD PRESSURE: 117 MMHG | HEART RATE: 82 BPM | DIASTOLIC BLOOD PRESSURE: 56 MMHG | HEIGHT: 63 IN | DIASTOLIC BLOOD PRESSURE: 59 MMHG | HEART RATE: 84 BPM | HEART RATE: 77 BPM | SYSTOLIC BLOOD PRESSURE: 135 MMHG | SYSTOLIC BLOOD PRESSURE: 130 MMHG | RESPIRATION RATE: 16 BRPM | OXYGEN SATURATION: 95 % | HEART RATE: 103 BPM | TEMPERATURE: 97.3 F | OXYGEN SATURATION: 99 % | OXYGEN SATURATION: 98 % | SYSTOLIC BLOOD PRESSURE: 134 MMHG | DIASTOLIC BLOOD PRESSURE: 93 MMHG | OXYGEN SATURATION: 96 % | SYSTOLIC BLOOD PRESSURE: 145 MMHG | RESPIRATION RATE: 18 BRPM | OXYGEN SATURATION: 93 % | DIASTOLIC BLOOD PRESSURE: 55 MMHG | HEART RATE: 83 BPM

## 2022-08-23 DIAGNOSIS — Z86.010 PERSONAL HISTORY OF COLONIC POLYPS: ICD-10-CM

## 2022-08-23 DIAGNOSIS — K21.9 GASTRO-ESOPHAGEAL REFLUX DISEASE WITHOUT ESOPHAGITIS: ICD-10-CM

## 2022-08-23 DIAGNOSIS — K44.9 DIAPHRAGMATIC HERNIA WITHOUT OBSTRUCTION OR GANGRENE: ICD-10-CM

## 2022-08-23 DIAGNOSIS — K57.30 DIVERTICULOSIS OF LARGE INTESTINE WITHOUT PERFORATION OR ABS: ICD-10-CM

## 2022-08-23 PROCEDURE — 43235 EGD DIAGNOSTIC BRUSH WASH: CPT | Performed by: INTERNAL MEDICINE

## 2022-08-23 PROCEDURE — G0105 COLORECTAL SCRN; HI RISK IND: HCPCS | Performed by: INTERNAL MEDICINE

## 2022-08-24 PROBLEM — K63.5 POLYP OF COLON: Status: ACTIVE | Noted: 2021-03-01

## 2022-11-22 ENCOUNTER — OFFICE (OUTPATIENT)
Dept: URBAN - METROPOLITAN AREA CLINIC 64 | Facility: CLINIC | Age: 72
End: 2022-11-22

## 2022-11-22 VITALS
HEART RATE: 78 BPM | HEIGHT: 63 IN | WEIGHT: 153 LBS | DIASTOLIC BLOOD PRESSURE: 87 MMHG | SYSTOLIC BLOOD PRESSURE: 165 MMHG

## 2022-11-22 DIAGNOSIS — D50.9 IRON DEFICIENCY ANEMIA, UNSPECIFIED: ICD-10-CM

## 2022-11-22 DIAGNOSIS — K21.9 GASTRO-ESOPHAGEAL REFLUX DISEASE WITHOUT ESOPHAGITIS: ICD-10-CM

## 2022-11-22 PROCEDURE — 99213 OFFICE O/P EST LOW 20 MIN: CPT | Performed by: NURSE PRACTITIONER

## 2023-08-21 ENCOUNTER — TRANSCRIBE ORDERS (OUTPATIENT)
Dept: CARDIOLOGY | Facility: HOSPITAL | Age: 73
End: 2023-08-21
Payer: MEDICARE

## 2023-08-21 DIAGNOSIS — R09.89 ABNORMAL CHEST SOUNDS: Primary | ICD-10-CM

## 2023-08-25 ENCOUNTER — HOSPITAL ENCOUNTER (OUTPATIENT)
Dept: CARDIOLOGY | Facility: HOSPITAL | Age: 73
Discharge: HOME | End: 2023-08-25
Payer: MEDICARE

## 2023-08-25 DIAGNOSIS — R09.89 ABNORMAL CHEST SOUNDS: ICD-10-CM

## 2023-08-25 LAB
BH CV LOWER ARTERIAL LEFT ABI RATIO: 1.09
BH CV LOWER ARTERIAL LEFT DORSALIS PEDIS SYS MAX: 163
BH CV LOWER ARTERIAL LEFT GREAT TOE SYS MAX: 100
BH CV LOWER ARTERIAL LEFT POST TIBIAL SYS MAX: 159
BH CV LOWER ARTERIAL LEFT TBI RATIO: 0.67
BH CV LOWER ARTERIAL RIGHT ABI RATIO: 1.13
BH CV LOWER ARTERIAL RIGHT DORSALIS PEDIS SYS MAX: 168
BH CV LOWER ARTERIAL RIGHT GREAT TOE SYS MAX: 65
BH CV LOWER ARTERIAL RIGHT POST TIBIAL SYS MAX: 163
BH CV LOWER ARTERIAL RIGHT TBI RATIO: 0.44
UPPER ARTERIAL LEFT ARM BRACHIAL SYS MAX: 143 MMHG
UPPER ARTERIAL RIGHT ARM BRACHIAL SYS MAX: 149 MMHG

## 2023-08-25 PROCEDURE — 93922 UPR/L XTREMITY ART 2 LEVELS: CPT

## 2023-09-19 ENCOUNTER — APPOINTMENT (OUTPATIENT)
Dept: VASCULAR SURGERY | Facility: HOSPITAL | Age: 73
End: 2023-09-19
Payer: MEDICARE

## 2023-09-19 PROCEDURE — G0463 HOSPITAL OUTPT CLINIC VISIT: HCPCS

## 2023-11-16 NOTE — PROGRESS NOTES
Chief Complaint  Numbness    Subjective            Mariajose Tabor presents to [facility] for Paresthesias   History of Present Illness  New patient referred by Dr. George for Paresthesias     Onset:3/2023, something effected her in march , before march had the redness in face and feet, resolved over the summer.   Most significant symptom is the legs  Now just weird feeling in the legs, weakness .  With walking, worse feeling. With prolonged walking symptoms worsen until has to stop, resting for several hours may get back to baseline  In leg weird feeling not thigh or foot  Location: BLE / BUE weakness in all extremities  Worsening factors: anxiety and being on her feet to much, feet also sometimes turn red  Alleviating factors: gabapentin and trazadone    Lay on left side get pain on left lateral thigh, at times to leg and lateral aspect of foot with numbness  Red face and feet, better  Swelling in hands is better  Arms felt weak at first but this is better.   Diarrhea for a week or two, then period of constipation.  Urinary, difficulty emptying at times  Left side of face feels numbness, at times feels like something in left ear.   Root canal on left , aches on and off worse at night  Can't walk for exercise, due to discomfort in legs starting in march     Has some lower back and neck ddd  Can have low back pain, sometimes worse than others  No pain radiating down leg   No numbness in thigh, some in left foot.     8/21/2023  Had a doppler done    Right Sided Findings:   Right Posterior Tibial: The pulse is detected w/ Doppler.    Right Dorsalis Pedis: The pulse is detected w/ Doppler.   Left Sided Findings:   Left Posterior Tibial: The pulse is detected w/ Doppler.    Left Dorsalis Pedis: The pulse is detected w/ Doppler.   Study Impression  Right Conclusion: The right RUTHIE is normal. Moderate digital ischemia.   Left Conclusion: The left RUTHIE is normal. Normal digital pressures.    Hgb  about 11  Gfr 59    Gabapentin helps some , lyrica not tolerated   History reviewed. No pertinent family history.    Past Medical History:   Diagnosis Date    GERD (gastroesophageal reflux disease)     Hypertension     Migraine        Social History     Socioeconomic History    Marital status:    Tobacco Use    Smoking status: Former     Types: Cigarettes     Quit date: 3/22/2011     Years since quittin.6    Smokeless tobacco: Never   Vaping Use    Vaping Use: Never used   Substance and Sexual Activity    Alcohol use: Yes     Alcohol/week: 7.0 standard drinks of alcohol     Types: 7 Shots of liquor per week    Drug use: Never    Sexual activity: Defer         Current Outpatient Medications:     alendronate (FOSAMAX) 70 MG tablet, TAKE 1 TABLET BY MOUTH ONCE A WEEK IN THE MORNING AT LEAST 30 MINUTES BEFORE FIRST FOOD, BEVERAGE OR MEDICATION OF THE DAY, Disp: , Rfl:     aspirin 81 MG EC tablet, Take 1 tablet by mouth Daily., Disp: , Rfl:     benazepril (LOTENSIN) 20 MG tablet, Take 1 tablet by mouth Daily., Disp: , Rfl:     Biotin 10 MG capsule, Take  by mouth., Disp: , Rfl:     calcium carbonate (OS-LILIANA) 600 MG tablet, Take 1 tablet by mouth Daily., Disp: , Rfl:     clobetasol (TEMOVATE) 0.05 % cream, Apply  topically to the appropriate area as directed Every Night. Scalp, Disp: , Rfl:     doxycycline (PERIOSTAT) 20 MG tablet, TAKE 1 TABLET BY MOUTH WITH BREAKFAST AND 1 ONCE DAILY WITH SUPPER, Disp: , Rfl:     gabapentin (NEURONTIN) 300 MG capsule, Take 1 capsule by mouth 2 (Two) Times a Day., Disp: , Rfl:     hydroCHLOROthiazide (HYDRODIURIL) 12.5 MG tablet, Take 1 tablet by mouth Daily., Disp: , Rfl:     meloxicam (MOBIC) 15 MG tablet, Take 1 tablet by mouth Daily., Disp: , Rfl:     Multiple Vitamins-Minerals (PRESERVISION AREDS 2 PO), Take 2 tablets by mouth Daily., Disp: , Rfl:     NON FORMULARY / PATIENT SUPPLIED MEDICATION, Insert 2 mL into the vagina 2 (Two) Times a Week. Estriol 0.5% cream-    "Insert 2ml twice weekly on Tuesdays and Fridays, Disp: , Rfl:     omeprazole (priLOSEC) 40 MG capsule, Take 1 capsule by mouth Daily. before a meal, Disp: , Rfl:     traZODone (DESYREL) 150 MG tablet, Take 2 tablets by mouth every night at bedtime., Disp: , Rfl:     vitamin C (ASCORBIC ACID) 250 MG tablet, Take 1 tablet by mouth Daily., Disp: , Rfl:     Review of Systems   HENT:  Positive for tinnitus.    Eyes: Negative.    Respiratory: Negative.     Cardiovascular:  Positive for leg swelling.   Gastrointestinal:  Positive for constipation and diarrhea.   Endocrine: Negative.    Genitourinary:  Positive for difficulty urinating.   Musculoskeletal:  Positive for back pain, joint swelling and neck pain.   Neurological:  Positive for weakness and numbness.   Psychiatric/Behavioral:  Positive for sleep disturbance.    All other systems reviewed and are negative.           Objective   Vital Signs:   /72 (BP Location: Left arm, Patient Position: Sitting, Cuff Size: Adult)   Pulse 66   Ht 160 cm (63\")   Wt 63 kg (139 lb)   BMI 24.62 kg/m²     Physical Exam  Vitals reviewed.   HENT:      Head: Normocephalic.      Nose: Nose normal.   Eyes:      Conjunctiva/sclera: Conjunctivae normal.   Cardiovascular:      Rate and Rhythm: Normal rate.   Pulmonary:      Effort: Pulmonary effort is normal. No respiratory distress.   Neurological:      General: No focal deficit present.      Mental Status: She is alert and oriented to person, place, and time.   Psychiatric:         Mood and Affect: Mood normal.        Result Review :                Neurologic Exam     Mental Status   Oriented to person, place, and time.              Assessment and Plan    Diagnoses and all orders for this visit:    1. Paresthesia (Primary)    2. Claudication of both lower extremities    3. Chronic low back pain without sciatica, unspecified back pain laterality    Pt t primary complaint relates to discomfort in back legs and feet, worse with " exercise  Doppler studies were normal    Will evaluate with mri lumbar and emg of lower ext      Follow Up   No follow-ups on file.  Patient was given instructions and counseling regarding her condition or for health maintenance advice. Please see specific information pulled into the AVS if appropriate.         This document has been electronically signed by Joseph Seipel, MD on November 22, 2023 13:43 EST

## 2023-11-22 ENCOUNTER — OFFICE VISIT (OUTPATIENT)
Dept: NEUROLOGY | Facility: CLINIC | Age: 73
End: 2023-11-22
Payer: MEDICARE

## 2023-11-22 VITALS
DIASTOLIC BLOOD PRESSURE: 72 MMHG | HEIGHT: 63 IN | SYSTOLIC BLOOD PRESSURE: 120 MMHG | WEIGHT: 139 LBS | BODY MASS INDEX: 24.63 KG/M2 | HEART RATE: 66 BPM

## 2023-11-22 DIAGNOSIS — R20.2 PARESTHESIA: Primary | ICD-10-CM

## 2023-11-22 DIAGNOSIS — M54.50 CHRONIC LOW BACK PAIN WITHOUT SCIATICA, UNSPECIFIED BACK PAIN LATERALITY: ICD-10-CM

## 2023-11-22 DIAGNOSIS — I73.9 CLAUDICATION OF BOTH LOWER EXTREMITIES: ICD-10-CM

## 2023-11-22 DIAGNOSIS — G89.29 CHRONIC LOW BACK PAIN WITHOUT SCIATICA, UNSPECIFIED BACK PAIN LATERALITY: ICD-10-CM

## 2023-11-22 PROCEDURE — 1160F RVW MEDS BY RX/DR IN RCRD: CPT | Performed by: PSYCHIATRY & NEUROLOGY

## 2023-11-22 PROCEDURE — 3074F SYST BP LT 130 MM HG: CPT | Performed by: PSYCHIATRY & NEUROLOGY

## 2023-11-22 PROCEDURE — 99204 OFFICE O/P NEW MOD 45 MIN: CPT | Performed by: PSYCHIATRY & NEUROLOGY

## 2023-11-22 PROCEDURE — 1159F MED LIST DOCD IN RCRD: CPT | Performed by: PSYCHIATRY & NEUROLOGY

## 2023-11-22 PROCEDURE — 3078F DIAST BP <80 MM HG: CPT | Performed by: PSYCHIATRY & NEUROLOGY

## 2023-11-22 RX ORDER — ASPIRIN 81 MG/1
81 TABLET ORAL DAILY
COMMUNITY

## 2023-11-22 RX ORDER — MELOXICAM 15 MG/1
15 TABLET ORAL DAILY
COMMUNITY

## 2023-11-22 RX ORDER — HYDROCHLOROTHIAZIDE 12.5 MG/1
12.5 TABLET ORAL DAILY
COMMUNITY

## 2023-11-22 RX ORDER — PREGABALIN 75 MG/1
CAPSULE ORAL
COMMUNITY
Start: 2023-08-17 | End: 2023-11-22

## 2023-11-22 RX ORDER — OMEPRAZOLE 40 MG/1
40 CAPSULE, DELAYED RELEASE ORAL DAILY
COMMUNITY

## 2023-11-22 RX ORDER — METHYLPREDNISOLONE 4 MG/1
TABLET ORAL
COMMUNITY
Start: 2023-10-18 | End: 2023-11-22

## 2023-11-22 RX ORDER — DOXYCYCLINE HYCLATE 20 MG
TABLET ORAL
COMMUNITY

## 2023-11-22 RX ORDER — AMOXICILLIN 500 MG/1
CAPSULE ORAL
COMMUNITY
Start: 2023-10-09 | End: 2023-11-22

## 2023-11-22 RX ORDER — TRAZODONE HYDROCHLORIDE 150 MG/1
300 TABLET ORAL
COMMUNITY
Start: 2023-11-18

## 2023-11-22 RX ORDER — GABAPENTIN 300 MG/1
300 CAPSULE ORAL 2 TIMES DAILY
COMMUNITY

## 2023-11-22 RX ORDER — ALENDRONATE SODIUM 70 MG/1
TABLET ORAL
COMMUNITY
Start: 2023-09-27

## 2023-11-28 ENCOUNTER — TELEPHONE (OUTPATIENT)
Dept: NEUROLOGY | Facility: CLINIC | Age: 73
End: 2023-11-28

## 2023-11-28 NOTE — TELEPHONE ENCOUNTER
PATIENT CALLING ABOUT LATEST LAB RESULTS.  SHE STATES PROVIDER WENT OVER THEM WITH HER BUT THERE IS A NUMBER THAT WAS GIVEN HER THAT IS DIFFERENT THAN THE COPY OF RESULTS SHE HAS NOW RECEIVED.    SHE IS ASKING FOR SOMEONE ON PROVIDER TEAM TO CONTACT HER WITH LATEST RESULTS TO GO OVER THEM AGAIN.    SHE IS MOST CONCERNED WITH EGFR NUMBER    PLEASE ADVISE PATIENT    THANK YOU

## 2023-12-01 ENCOUNTER — PATIENT ROUNDING (BHMG ONLY) (OUTPATIENT)
Dept: NEUROLOGY | Facility: CLINIC | Age: 73
End: 2023-12-01
Payer: MEDICARE

## 2023-12-04 ENCOUNTER — LAB (OUTPATIENT)
Dept: LAB | Facility: HOSPITAL | Age: 73
End: 2023-12-04
Payer: MEDICARE

## 2023-12-04 DIAGNOSIS — R20.2 PARESTHESIA: ICD-10-CM

## 2023-12-04 DIAGNOSIS — G89.29 CHRONIC LOW BACK PAIN WITHOUT SCIATICA, UNSPECIFIED BACK PAIN LATERALITY: ICD-10-CM

## 2023-12-04 DIAGNOSIS — M54.50 CHRONIC LOW BACK PAIN WITHOUT SCIATICA, UNSPECIFIED BACK PAIN LATERALITY: ICD-10-CM

## 2023-12-04 DIAGNOSIS — I73.9 CLAUDICATION OF BOTH LOWER EXTREMITIES: ICD-10-CM

## 2023-12-04 LAB
ALBUMIN SERPL-MCNC: 4.5 G/DL (ref 3.5–5.2)
ALBUMIN/GLOB SERPL: 2.3 G/DL
ALP SERPL-CCNC: 80 U/L (ref 39–117)
ALT SERPL W P-5'-P-CCNC: 10 U/L (ref 1–33)
ANION GAP SERPL CALCULATED.3IONS-SCNC: 7 MMOL/L (ref 5–15)
AST SERPL-CCNC: 15 U/L (ref 1–32)
BASOPHILS # BLD AUTO: 0.06 10*3/MM3 (ref 0–0.2)
BASOPHILS NFR BLD AUTO: 1.3 % (ref 0–1.5)
BILIRUB SERPL-MCNC: 0.3 MG/DL (ref 0–1.2)
BUN SERPL-MCNC: 14 MG/DL (ref 8–23)
BUN/CREAT SERPL: 16.3 (ref 7–25)
CALCIUM SPEC-SCNC: 9.3 MG/DL (ref 8.6–10.5)
CHLORIDE SERPL-SCNC: 98 MMOL/L (ref 98–107)
CK SERPL-CCNC: 72 U/L (ref 20–180)
CO2 SERPL-SCNC: 30 MMOL/L (ref 22–29)
CREAT SERPL-MCNC: 0.86 MG/DL (ref 0.57–1)
CRP SERPL-MCNC: <0.3 MG/DL (ref 0–0.5)
DEPRECATED RDW RBC AUTO: 42.7 FL (ref 37–54)
EGFRCR SERPLBLD CKD-EPI 2021: 72.3 ML/MIN/1.73
EOSINOPHIL # BLD AUTO: 0.05 10*3/MM3 (ref 0–0.4)
EOSINOPHIL NFR BLD AUTO: 1 % (ref 0.3–6.2)
ERYTHROCYTE [DISTWIDTH] IN BLOOD BY AUTOMATED COUNT: 12.8 % (ref 12.3–15.4)
ERYTHROCYTE [SEDIMENTATION RATE] IN BLOOD: 14 MM/HR (ref 0–30)
GLOBULIN UR ELPH-MCNC: 2 GM/DL
GLUCOSE SERPL-MCNC: 87 MG/DL (ref 65–99)
HCT VFR BLD AUTO: 38.4 % (ref 34–46.6)
HGB BLD-MCNC: 12.5 G/DL (ref 12–15.9)
IMM GRANULOCYTES # BLD AUTO: 0.01 10*3/MM3 (ref 0–0.05)
IMM GRANULOCYTES NFR BLD AUTO: 0.2 % (ref 0–0.5)
LYMPHOCYTES # BLD AUTO: 1.11 10*3/MM3 (ref 0.7–3.1)
LYMPHOCYTES NFR BLD AUTO: 23.2 % (ref 19.6–45.3)
MCH RBC QN AUTO: 29.8 PG (ref 26.6–33)
MCHC RBC AUTO-ENTMCNC: 32.6 G/DL (ref 31.5–35.7)
MCV RBC AUTO: 91.6 FL (ref 79–97)
MONOCYTES # BLD AUTO: 0.46 10*3/MM3 (ref 0.1–0.9)
MONOCYTES NFR BLD AUTO: 9.6 % (ref 5–12)
NEUTROPHILS NFR BLD AUTO: 3.1 10*3/MM3 (ref 1.7–7)
NEUTROPHILS NFR BLD AUTO: 64.7 % (ref 42.7–76)
NRBC BLD AUTO-RTO: 0 /100 WBC (ref 0–0.2)
PLATELET # BLD AUTO: 328 10*3/MM3 (ref 140–450)
PMV BLD AUTO: 9.1 FL (ref 6–12)
POTASSIUM SERPL-SCNC: 4.9 MMOL/L (ref 3.5–5.2)
PROT SERPL-MCNC: 6.5 G/DL (ref 6–8.5)
RBC # BLD AUTO: 4.19 10*6/MM3 (ref 3.77–5.28)
SODIUM SERPL-SCNC: 135 MMOL/L (ref 136–145)
WBC NRBC COR # BLD AUTO: 4.79 10*3/MM3 (ref 3.4–10.8)

## 2023-12-04 PROCEDURE — 86235 NUCLEAR ANTIGEN ANTIBODY: CPT

## 2023-12-04 PROCEDURE — 85652 RBC SED RATE AUTOMATED: CPT

## 2023-12-04 PROCEDURE — 80053 COMPREHEN METABOLIC PANEL: CPT

## 2023-12-04 PROCEDURE — 86140 C-REACTIVE PROTEIN: CPT

## 2023-12-04 PROCEDURE — 82085 ASSAY OF ALDOLASE: CPT

## 2023-12-04 PROCEDURE — 85025 COMPLETE CBC W/AUTO DIFF WBC: CPT

## 2023-12-04 PROCEDURE — 36415 COLL VENOUS BLD VENIPUNCTURE: CPT

## 2023-12-04 PROCEDURE — 86225 DNA ANTIBODY NATIVE: CPT

## 2023-12-04 PROCEDURE — 82550 ASSAY OF CK (CPK): CPT

## 2023-12-04 PROCEDURE — 86038 ANTINUCLEAR ANTIBODIES: CPT

## 2023-12-05 LAB — ALDOLASE SERPL-CCNC: 2.3 U/L (ref 3.3–10.3)

## 2023-12-08 LAB
ANA HOMOGEN TITR SER: NORMAL {TITER}
ANA SER QL IF: POSITIVE
CENTROMERE B AB SER-ACNC: <0.2 AI (ref 0–0.9)
CHROMATIN AB SERPL-ACNC: <0.2 AI (ref 0–0.9)
DSDNA AB SER-ACNC: <1 IU/ML (ref 0–9)
ENA JO1 AB SER-ACNC: <0.2 AI (ref 0–0.9)
ENA RNP AB SER-ACNC: <0.2 AI (ref 0–0.9)
ENA SCL70 AB SER-ACNC: <0.2 AI (ref 0–0.9)
ENA SM AB SER-ACNC: <0.2 AI (ref 0–0.9)
ENA SS-A AB SER-ACNC: <0.2 AI (ref 0–0.9)
ENA SS-B AB SER-ACNC: <0.2 AI (ref 0–0.9)
LABORATORY COMMENT REPORT: ABNORMAL
Lab: NORMAL
Lab: NORMAL

## 2023-12-18 ENCOUNTER — OFFICE (OUTPATIENT)
Dept: URBAN - METROPOLITAN AREA CLINIC 64 | Facility: CLINIC | Age: 73
End: 2023-12-18

## 2023-12-18 VITALS
HEART RATE: 67 BPM | WEIGHT: 139 LBS | SYSTOLIC BLOOD PRESSURE: 136 MMHG | HEIGHT: 63 IN | DIASTOLIC BLOOD PRESSURE: 72 MMHG

## 2023-12-18 DIAGNOSIS — R19.7 DIARRHEA, UNSPECIFIED: ICD-10-CM

## 2023-12-18 PROCEDURE — 99214 OFFICE O/P EST MOD 30 MIN: CPT | Performed by: INTERNAL MEDICINE

## 2023-12-18 RX ORDER — DICYCLOMINE HYDROCHLORIDE 20 MG/1
40 TABLET ORAL
Qty: 60 | Refills: 11 | Status: ACTIVE
Start: 2023-12-18

## 2024-01-22 ENCOUNTER — HOSPITAL ENCOUNTER (OUTPATIENT)
Dept: MRI IMAGING | Facility: HOSPITAL | Age: 74
Discharge: HOME | End: 2024-01-22
Admitting: PSYCHIATRY & NEUROLOGY
Payer: MEDICARE

## 2024-01-22 DIAGNOSIS — R20.2 PARESTHESIA: ICD-10-CM

## 2024-01-22 DIAGNOSIS — M54.50 CHRONIC LOW BACK PAIN WITHOUT SCIATICA, UNSPECIFIED BACK PAIN LATERALITY: ICD-10-CM

## 2024-01-22 DIAGNOSIS — I73.9 CLAUDICATION OF BOTH LOWER EXTREMITIES: ICD-10-CM

## 2024-01-22 DIAGNOSIS — G89.29 CHRONIC LOW BACK PAIN WITHOUT SCIATICA, UNSPECIFIED BACK PAIN LATERALITY: ICD-10-CM

## 2024-01-22 PROCEDURE — 72148 MRI LUMBAR SPINE W/O DYE: CPT

## 2024-01-22 NOTE — PROGRESS NOTES
EMG and Nerve Conduction Studies    Patient Name: Mariajose Tabor    Date of Study:1/23/24    Referring Provider:DR. SEIPEL    History:    BLE-PARESTHESIA    Results:    The complete report includes the data sheets.     Prior to starting the procedure, the patient's identity was verified, pertinent available records were reviewed, the nature of the procedure was explained, the appropriate site of the exam were confirmed directly with the patient, and a pre-procedure pause was performed for final verification of all the above.    1.  The right sural sensory nerve conduction study was normal.    2.  The right peroneal and left tibial motor nerve conduction studies were normal with normal F-wave latencies.    3.  EMG of the muscles examined in the right L3-S1 myotomes were normal.    Impression:    Is a normal study of the lower extremities.  There is no evidence of sensorimotor neuropathy or neurogenic or myopathic changes on electromyography.    Electronically signed by :    Joseph Seipel, M.D.  January 23, 2024    Addendum    Given the patient's persistent symptoms, unremarkable MRI of the lumbar spine and this EMG testing and the positive TIO the patient will be referred to rheumatology for further evaluation

## 2024-01-23 ENCOUNTER — TELEPHONE (OUTPATIENT)
Dept: NEUROLOGY | Facility: CLINIC | Age: 74
End: 2024-01-23

## 2024-01-23 ENCOUNTER — PROCEDURE VISIT (OUTPATIENT)
Dept: NEUROLOGY | Facility: CLINIC | Age: 74
End: 2024-01-23
Payer: MEDICARE

## 2024-01-23 VITALS
SYSTOLIC BLOOD PRESSURE: 108 MMHG | BODY MASS INDEX: 24.63 KG/M2 | DIASTOLIC BLOOD PRESSURE: 65 MMHG | HEIGHT: 63 IN | HEART RATE: 61 BPM | WEIGHT: 139 LBS

## 2024-01-23 DIAGNOSIS — R20.2 PARESTHESIA: ICD-10-CM

## 2024-01-23 DIAGNOSIS — R20.2 PARESTHESIA: Primary | ICD-10-CM

## 2024-01-23 DIAGNOSIS — I73.9 CLAUDICATION OF BOTH LOWER EXTREMITIES: ICD-10-CM

## 2024-01-23 DIAGNOSIS — R76.8 POSITIVE ANA (ANTINUCLEAR ANTIBODY): ICD-10-CM

## 2024-01-23 DIAGNOSIS — M54.50 CHRONIC LOW BACK PAIN WITHOUT SCIATICA, UNSPECIFIED BACK PAIN LATERALITY: ICD-10-CM

## 2024-01-23 DIAGNOSIS — R29.898 WEAKNESS OF BOTH LOWER EXTREMITIES: ICD-10-CM

## 2024-01-23 DIAGNOSIS — G89.29 CHRONIC LOW BACK PAIN WITHOUT SCIATICA, UNSPECIFIED BACK PAIN LATERALITY: ICD-10-CM

## 2024-01-23 PROCEDURE — 95886 MUSC TEST DONE W/N TEST COMP: CPT | Performed by: PSYCHIATRY & NEUROLOGY

## 2024-01-23 PROCEDURE — 95908 NRV CNDJ TST 3-4 STUDIES: CPT | Performed by: PSYCHIATRY & NEUROLOGY

## 2024-01-24 NOTE — TELEPHONE ENCOUNTER
This patient was seen in consultation in November with complaints of discomfort in the lower extremities and weakness as well as other symptoms.    Laboratory studies were all essentially normal except for a positive TIO with a titer of 1-80 with a homogeneous pattern.  This pattern can be associated with SLE.    MRI of the lumbar spine showed only mild spinal and neural foramina stenosis.    EMG of the lower extremities today was normal with no evidence of sensorimotor neuropathy myopathy or neurogenic change on electromyography and nerve conduction studies.    Given the patient's persistent symptoms and the positive TIO I discussed with the patient referral to rheumatology for further evaluation in this regard.

## 2024-01-29 ENCOUNTER — TELEPHONE (OUTPATIENT)
Dept: NEUROLOGY | Facility: CLINIC | Age: 74
End: 2024-01-29

## 2024-01-29 NOTE — TELEPHONE ENCOUNTER
PATIENT CALLING TO ASK FOR REFERRAL TO    [facility] BE [facility], THEY ARE TELLING HER THEY HAVE NOT RECEIVED A REFERRAL FROM OFFICE    THANK YOU

## 2024-01-30 NOTE — TELEPHONE ENCOUNTER
PAT[facility] CALL[facility]     ADVISED WE SENT REFERRAL TO THEM AGAIN THIS MORNING.    PATIENT ACKNOWLEDGES UNDERSTANDING.

## 2024-01-30 NOTE — TELEPHONE ENCOUNTER
[phone] WILL CALL PATIENT SENT PATIENT MY CHART MESSAGE

## 2024-04-02 PROBLEM — R52 PAIN: Status: ACTIVE | Noted: 2024-04-02

## 2024-04-02 PROBLEM — M54.50 LOW BACK PAIN: Status: ACTIVE | Noted: 2024-04-02

## 2024-04-02 PROBLEM — M50.30 DEGENERATIVE DISC DISEASE, CERVICAL: Status: ACTIVE | Noted: 2024-04-02

## 2024-04-02 PROBLEM — R52 TINGLING PAIN: Status: ACTIVE | Noted: 2024-04-02

## 2024-04-02 PROBLEM — Z87.891 FORMER CIGARETTE SMOKER: Status: ACTIVE | Noted: 2024-04-02

## 2024-04-02 PROBLEM — R53.1 WEAKNESS: Status: ACTIVE | Noted: 2024-04-02

## 2024-04-12 NOTE — PROGRESS NOTES
"Chief Complaint  Follow-up and Leg Pain    Subjective        HPI: Mariajose Tabor is a 73 y.o. woman who returns for reassessment of pain and tingling in the right and left feet.  I met her in September, 2023 and noted easily palpable pedal artery pulses and normal ABIs.  I was concerned about a possible spinal etiology.  She returns today with ongoing paresthesias and weakness, resulting in marked activity limitation and impairment in her functional status.  She has seen a neurologist, and EMG is negative.  She was referred to a rheumatologist, and though she had a positive TIO, she is felt not to have an autoimmune condition.  She is back because she understood from some of the other physicians that the vascular evaluation was incomplete, and she may require additional testing.    Objective   Vital Signs:  /45 (BP Location: Right arm, Patient Position: Sitting)   Pulse 83   Ht 160 cm (63\")   Wt 57.2 kg (126 lb)   BMI 22.32 kg/m²     Exam: She has easily palpable pedal artery pulses.  Result Review :      Data: Lower extremity ABIs 8/25/2023 1.13 and 1.09, respectively.  CT scan of the abdomen and pelvis August 2021 with mild calcific aortoiliac atherosclerosis, but no stenoses.      Mariajose Tabor  reports that she quit smoking about 13 years ago. Her smoking use included cigarettes. She has never used smokeless tobacco..           Assessment and Plan     Diagnoses and all orders for this visit:    1. Tingling pain (Primary)  -     Doppler Arterial Lower Extremity Stress CAR; Future    2. Weakness  -     Doppler Arterial Lower Extremity Stress CAR; Future    3. Atherosclerosis of aorta  -     Doppler Arterial Lower Extremity Stress CAR; Future      73-year-old woman with disabling lower extremity paresthesias and weakness, particularly with prolonged standing.  She also has symptoms when she walks.  This occurs in the setting of palpable pedal pulses and normal resting lower extremity arterial perfusion " by noninvasive testing August, 2023.  A contrast study would not improve the sensitivity of our assessment.  The only thing we could do is exercise testing.  I described this in the patient is willing to try.  My index of suspicion to find significant occlusive disease is low, but the patient is severely impaired because of her symptoms.  Will see her after her test.  Questions answered    Follow Up     Return for Follow-up after test.  Patient was given instructions and counseling regarding her condition or for health maintenance advice. Please see specific information pulled into the AVS if appropriate.

## 2024-04-16 ENCOUNTER — OFFICE VISIT (OUTPATIENT)
Age: 74
End: 2024-04-16
Payer: MEDICARE

## 2024-04-16 VITALS
BODY MASS INDEX: 22.32 KG/M2 | WEIGHT: 126 LBS | HEART RATE: 83 BPM | SYSTOLIC BLOOD PRESSURE: 122 MMHG | DIASTOLIC BLOOD PRESSURE: 45 MMHG | HEIGHT: 63 IN

## 2024-04-16 DIAGNOSIS — I70.0 ATHEROSCLEROSIS OF AORTA: ICD-10-CM

## 2024-04-16 DIAGNOSIS — R53.1 WEAKNESS: ICD-10-CM

## 2024-04-16 DIAGNOSIS — R52 TINGLING PAIN: Primary | ICD-10-CM

## 2024-04-23 ENCOUNTER — HOSPITAL ENCOUNTER (OUTPATIENT)
Dept: CARDIOLOGY | Facility: HOSPITAL | Age: 74
Discharge: HOME | End: 2024-04-23
Admitting: SURGERY
Payer: MEDICARE

## 2024-04-23 DIAGNOSIS — R53.1 WEAKNESS: ICD-10-CM

## 2024-04-23 DIAGNOSIS — R52 TINGLING PAIN: ICD-10-CM

## 2024-04-23 DIAGNOSIS — I70.0 ATHEROSCLEROSIS OF AORTA: ICD-10-CM

## 2024-04-23 LAB
BH CV LOWER ARTERIAL LEFT ABI RATIO: 1.12
BH CV LOWER ARTERIAL LEFT DORSALIS PEDIS SYS MAX: 140
BH CV LOWER ARTERIAL LEFT GREAT TOE SYS MAX: 81
BH CV LOWER ARTERIAL LEFT POST EX ABI RATIO: 1.2
BH CV LOWER ARTERIAL LEFT POST TIBIAL SYS MAX: 145
BH CV LOWER ARTERIAL LEFT TBI RATIO: 0.63
BH CV LOWER ARTERIAL RIGHT ABI RATIO: 1.1
BH CV LOWER ARTERIAL RIGHT DORSALIS PEDIS SYS MAX: 142
BH CV LOWER ARTERIAL RIGHT GREAT TOE SYS MAX: 55
BH CV LOWER ARTERIAL RIGHT POST EX ABI RATIO: 1.18
BH CV LOWER ARTERIAL RIGHT POST TIBIAL SYS MAX: 139
BH CV LOWER ARTERIAL RIGHT TBI RATIO: 0.43
UPPER ARTERIAL LEFT ARM BRACHIAL SYS MAX: 123
UPPER ARTERIAL RIGHT ARM BRACHIAL SYS MAX: 129

## 2024-04-23 PROCEDURE — 93924 LWR XTR VASC STDY BILAT: CPT

## 2024-05-07 ENCOUNTER — OFFICE VISIT (OUTPATIENT)
Age: 74
End: 2024-05-07
Payer: MEDICARE

## 2024-05-07 VITALS
HEART RATE: 82 BPM | SYSTOLIC BLOOD PRESSURE: 120 MMHG | HEIGHT: 63 IN | OXYGEN SATURATION: 99 % | WEIGHT: 126 LBS | DIASTOLIC BLOOD PRESSURE: 76 MMHG | BODY MASS INDEX: 22.32 KG/M2

## 2024-05-07 DIAGNOSIS — R53.1 WEAKNESS: ICD-10-CM

## 2024-05-07 DIAGNOSIS — R52 TINGLING PAIN: Primary | ICD-10-CM

## 2024-05-30 ENCOUNTER — OFFICE VISIT (OUTPATIENT)
Dept: NEUROLOGY | Facility: CLINIC | Age: 74
End: 2024-05-30
Payer: MEDICARE

## 2024-05-30 ENCOUNTER — LAB (OUTPATIENT)
Dept: LAB | Facility: HOSPITAL | Age: 74
End: 2024-05-30
Payer: MEDICARE

## 2024-05-30 VITALS
OXYGEN SATURATION: 98 % | HEART RATE: 59 BPM | SYSTOLIC BLOOD PRESSURE: 118 MMHG | RESPIRATION RATE: 20 BRPM | HEIGHT: 63 IN | DIASTOLIC BLOOD PRESSURE: 64 MMHG | BODY MASS INDEX: 23.04 KG/M2 | WEIGHT: 130 LBS

## 2024-05-30 DIAGNOSIS — R20.0 NUMBNESS IN BOTH LEGS: Primary | ICD-10-CM

## 2024-05-30 DIAGNOSIS — M62.89 PERIPHERAL MUSCLE FATIGUE: ICD-10-CM

## 2024-05-30 DIAGNOSIS — R76.8 POSITIVE ANA (ANTINUCLEAR ANTIBODY): ICD-10-CM

## 2024-05-30 LAB
CRP SERPL-MCNC: <0.3 MG/DL (ref 0–0.5)
ERYTHROCYTE [SEDIMENTATION RATE] IN BLOOD: 7 MM/HR (ref 0–30)
FOLATE SERPL-MCNC: >20 NG/ML (ref 4.78–24.2)
RPR SER QL: NORMAL
TSH SERPL DL<=0.05 MIU/L-ACNC: 2.93 UIU/ML (ref 0.27–4.2)
VIT B12 BLD-MCNC: >2000 PG/ML (ref 211–946)

## 2024-05-30 PROCEDURE — 84165 PROTEIN E-PHORESIS SERUM: CPT

## 2024-05-30 PROCEDURE — 82607 VITAMIN B-12: CPT

## 2024-05-30 PROCEDURE — 83655 ASSAY OF LEAD: CPT

## 2024-05-30 PROCEDURE — 86140 C-REACTIVE PROTEIN: CPT

## 2024-05-30 PROCEDURE — 82175 ASSAY OF ARSENIC: CPT

## 2024-05-30 PROCEDURE — 82550 ASSAY OF CK (CPK): CPT

## 2024-05-30 PROCEDURE — 84155 ASSAY OF PROTEIN SERUM: CPT

## 2024-05-30 PROCEDURE — 82085 ASSAY OF ALDOLASE: CPT

## 2024-05-30 PROCEDURE — 85652 RBC SED RATE AUTOMATED: CPT

## 2024-05-30 PROCEDURE — 84443 ASSAY THYROID STIM HORMONE: CPT

## 2024-05-30 PROCEDURE — 83521 IG LIGHT CHAINS FREE EACH: CPT

## 2024-05-30 PROCEDURE — 86258 DGP ANTIBODY EACH IG CLASS: CPT

## 2024-05-30 PROCEDURE — 84446 ASSAY OF VITAMIN E: CPT

## 2024-05-30 PROCEDURE — 36415 COLL VENOUS BLD VENIPUNCTURE: CPT

## 2024-05-30 PROCEDURE — 86592 SYPHILIS TEST NON-TREP QUAL: CPT

## 2024-05-30 PROCEDURE — 84207 ASSAY OF VITAMIN B-6: CPT

## 2024-05-30 PROCEDURE — 82525 ASSAY OF COPPER: CPT

## 2024-05-30 PROCEDURE — 83825 ASSAY OF MERCURY: CPT

## 2024-05-30 PROCEDURE — 82784 ASSAY IGA/IGD/IGG/IGM EACH: CPT

## 2024-05-30 PROCEDURE — 83615 LACTATE (LD) (LDH) ENZYME: CPT

## 2024-05-30 PROCEDURE — 82746 ASSAY OF FOLIC ACID SERUM: CPT

## 2024-05-30 PROCEDURE — 86364 TISS TRNSGLTMNASE EA IG CLAS: CPT

## 2024-05-30 PROCEDURE — 84425 ASSAY OF VITAMIN B-1: CPT

## 2024-05-30 RX ORDER — LANOLIN ALCOHOL/MO/W.PET/CERES
1000 CREAM (GRAM) TOPICAL DAILY
COMMUNITY

## 2024-05-30 NOTE — PROGRESS NOTES
"DOS: 2024  NAME: Mariajose Tabor   : 1950  PCP: Lilia George MD    Chief Complaint   Patient presents with    Numbness      SUBJECTIVE  Neurological Problem:  74 y.o. RHW female with a past medical history of HTN, benign positional vertigo, umbilical hernia, chronic low back pain, cervical and lumbar DDD, former tobacco user. Patient and problem are new to examiner. History is provided by patient and review of records that are summarized below.  She is accompanied by her spouse.    HPI:  I have had the pleasure of seeing your patient today. As you know, Elidia is being seen at the request of and referred to us by Lilia George MD. The patient reports a history of leg weakness, intermittent numbness and a burning sensation to her lower extremities that has been ongoing since 2023.  She says in March of this year she developed a \"butterfly\" rash on her face that would come and go, she would most notice it when she was out to eat.  She also noticed that her feet would turn a red color.  She denied any illness, bug bite etc. that proceeded these events.  Then in the summer 2023 she reported her legs felt like \"blood was churning\" in the middle of the night, with an intense pain that kept her from sleeping. She was prescribed trazodone 300 mg nightly at this time.  She says she has a history of restless leg syndrome and these sensations feel different.  She can feel at times that her legs do feel like they are \"burning hot, then they cool off\".  She also feels a numb sensation to her posterior lower extremities from the knee down.  Around this time she also developed weakness upon standing, felt mostly distal from knee to ankle but at times will occur approximately in her thighs.  This is associated with pain like an ache and if she over does her activity the pain becomes more intense.  She says the sensation feels like her legs are weak, not fatigued.  At night she is unable to tolerate lying on " her side with her legs pressed together and she does not tolerate clothing on her distal legs or socks on her ankles, also cannot tolerate enclosed shoes.  At night she also experiences an uncomfortable feeling in her arms, not so much the churning sensation though that has happened.  She is also unable to tolerate sitting on elevated surfaces with her legs dangling, her feet have to be flat on the floor while sitting.  Driving bothers her when sitting in the same position.  She has not found any alleviating factors, finds that her symptoms may improve during the day but never go away.  Her symptoms are worse in the morning and at night.  Elevating her lower extremities does not help.  Stress and alcohol use seem to make her symptoms worse; she does have a history of alcohol use however says she rarely drinks now.  She denies any falls, denies dragging of her feet or a foot drop.  She says she does experience some low back pain at times but no pain radiating down her legs. She says she can feel surfaces under her feet and can feel the vehicle pedals.  She does not have a history of thyroid disease or diabetes, no history of receiving chemotherapy or radiation, no known exposure to heavy metals, no past injury or trauma.  She denies a family history of neuropathy and no family history of autoimmune disorders however she does have a history of vitiligo and lichen planus affecting her scalp and fingernails.  She has been prescribed gabapentin 300 mg in the morning and 600 mg at night which she felt was helpful at first however now not so much.  Pregabalin did not help her symptoms at all.  She is also utilizing a neuropathic compound cream and feels it could be helpful but she is unsure.    Work-up thus far:  She was evaluated by vascular, ABIs normal and Dopplers of lower extremities negative.  She had a lumbar MRI completed in January 2024 that just showed degenerative changes.  She had an EMG of her bilateral lower  extremities completed in January 2024 that resulted as a normal study.  Review of labs: aldolase 2.3, CK 72, CRP <0.30, ESR 14, TIO positive, TIO reflex negative, liver enzymes normal.       Review of Systems   Musculoskeletal:  Negative for gait problem.   Neurological:  Positive for weakness and numbness. Negative for dizziness, tremors, seizures, syncope, facial asymmetry, speech difficulty, light-headedness and headaches.   Psychiatric/Behavioral:  Negative for agitation, behavioral problems, confusion, decreased concentration, dysphoric mood, hallucinations, self-injury, sleep disturbance and suicidal ideas. The patient is not nervous/anxious and is not hyperactive.         The following portions of the patient's history were reviewed and updated as appropriate: allergies, current medications, past family history, past medical history, past social history, past surgical history and problem list.    Current Medications:   Current Outpatient Medications:     alendronate (FOSAMAX) 70 MG tablet, TAKE 1 TABLET BY MOUTH ONCE A WEEK IN THE MORNING AT LEAST 30 MINUTES BEFORE FIRST FOOD, BEVERAGE OR MEDICATION OF THE DAY, Disp: , Rfl:     aspirin 81 MG EC tablet, Take 1 tablet by mouth Daily., Disp: , Rfl:     benazepril (LOTENSIN) 20 MG tablet, Take 1 tablet by mouth Daily., Disp: , Rfl:     Biotin 10 MG capsule, Take  by mouth., Disp: , Rfl:     calcium carbonate (OS-LILIANA) 600 MG tablet, Take 1 tablet by mouth Daily., Disp: , Rfl:     clobetasol (TEMOVATE) 0.05 % cream, Apply  topically to the appropriate area as directed Every Night. Scalp, Disp: , Rfl:     doxycycline (PERIOSTAT) 20 MG tablet, TAKE 1 TABLET BY MOUTH WITH BREAKFAST AND 1 ONCE DAILY WITH SUPPER, Disp: , Rfl:     gabapentin (NEURONTIN) 300 MG capsule, Take 1 capsule by mouth 3 (Three) Times a Day., Disp: , Rfl:     hydroCHLOROthiazide (HYDRODIURIL) 12.5 MG tablet, Take 1 tablet by mouth Daily., Disp: , Rfl:     meloxicam (MOBIC) 15 MG tablet, Take 1  "tablet by mouth Daily., Disp: , Rfl:     Multiple Vitamins-Minerals (PRESERVISION AREDS 2 PO), Take 2 tablets by mouth Daily., Disp: , Rfl:     NON FORMULARY / PATIENT SUPPLIED MEDICATION, Insert 2 mL into the vagina 2 (Two) Times a Week. Estriol 0.5% cream-   Insert 2ml twice weekly on Tuesdays and Fridays, Disp: , Rfl:     omeprazole (priLOSEC) 40 MG capsule, Take 1 capsule by mouth Daily. before a meal, Disp: , Rfl:     traZODone (DESYREL) 150 MG tablet, Take 2 tablets by mouth every night at bedtime., Disp: , Rfl:     vitamin B-12 (CYANOCOBALAMIN) 1000 MCG tablet, Take 1 tablet by mouth Daily., Disp: , Rfl:     vitamin C (ASCORBIC ACID) 250 MG tablet, Take 1 tablet by mouth Daily., Disp: , Rfl:   **I did not stop or change the above medications.  Patient's medication list was updated to reflect medications they have reported as currently taking, including medication changes made by other providers.    Objective   Vital Signs:  /64   Pulse 59   Resp 20   Ht 160 cm (62.99\")   Wt 59 kg (130 lb)   SpO2 98%   BMI 23.03 kg/m²   Body mass index is 23.03 kg/m².    Physical Exam   Physical Exam:  GENERAL: NAD, alert  HEENT: Normocephalic, atraumatic   Resp: Even and unlabored  Extremities: No edema  Skin: Warm and dry  Psychiatric: Normal mood and affect    Neurological:   MS: AOx3, recent/remote memory intact, normal attention/concentration, language intact, no neglect.   CN: visual acuity grossly normal, PERRL, EOMI, no nystagmus, no facial droop, no dysarthria  Motor: Normal tone. No tremor or abnormal movements noted.   Trapezius elevation: 5/5  Shoulder abductors 5/5  Elbow flexors 5/5  Elbow extensors 5/5   Left  2+  Right  2+  Hip flexors 5/5  Knee extensors 5/5  Hamstring strength 5/5  Dorsiflexors 5/5  Plantar flexors 5/5  Sensory: Intact to light touch, vibration and proprioception in bilateral lower ext.  Pinprick sensation increased in bilateral feet.  Cold temp sensation decreased in " "bilateral lower extremities, unable to feel cold temp medial lateral aspect of left lower extremity.  Gait and station: Normal gait and station    Reflexes   Right patellar: 2+  Left patellar: 2+  Right achilles: 2+  Left achilles: 2+   Plantars down-going    Result Review :  The following data was reviewed by: YAS Royal on 05/30/2024:  Laboratory Results:         Lab Results   Component Value Date    WBC 4.79 12/04/2023    HGB 12.5 12/04/2023    HCT 38.4 12/04/2023    MCV 91.6 12/04/2023     12/04/2023     Lab Results   Component Value Date    GLUCOSE 87 12/04/2023    BUN 14 12/04/2023    CREATININE 0.86 12/04/2023    EGFRIFNONA 77 07/07/2021    BCR 16.3 12/04/2023    K 4.9 12/04/2023    CO2 30.0 (H) 12/04/2023    CALCIUM 9.3 12/04/2023    ALBUMIN 4.5 12/04/2023    AST 15 12/04/2023    ALT 10 12/04/2023     No results found for: \"HGBA1C\"  No results found for: \"CHOL\"  No results found for: \"HDL\"  No results found for: \"LDL\"  No results found for: \"TRIG\"  Lab Results   Component Value Date    RPR Non-Reactive 05/30/2024     Lab Results   Component Value Date    TSH 2.930 05/30/2024     Lab Results   Component Value Date    QGXRIIOJ29 >2,000 (H) 05/30/2024       Data reviewed : Radiologic studies  , Consultant notes  , and           Assessment and Plan   Diagnoses and all orders for this visit:    1. Numbness in both legs (Primary)  -     Heavy Metals, Blood  -     Vitamin E  -     Vitamin B6  -     Vitamin B12  -     Vitamin B1, Whole Blood  -     TSH Rfx On Abnormal To Free T4  -     Sedimentation Rate  -     RPR  -     Protein Elec + Interp, Serum  -     Immunoglobulin Free LT Chains Blood  -     Folate  -     C-reactive Protein  -     Copper, Serum  -     Celiac Disease Antibody Screen  -     MRI Brain With & Without Contrast; Future  -     MRI Cervical Spine With & Without Contrast; Future  -     MRI Thoracic Spine With & Without Contrast; Future    2. Peripheral muscle fatigue  -     Heavy " Metals, Blood  -     Vitamin E  -     Vitamin B6  -     Vitamin B12  -     Vitamin B1, Whole Blood  -     TSH Rfx On Abnormal To Free T4  -     Sedimentation Rate  -     RPR  -     Protein Elec + Interp, Serum  -     Immunoglobulin Free LT Chains Blood  -     Folate  -     C-reactive Protein  -     Copper, Serum  -     Celiac Disease Antibody Screen  -     MRI Brain With & Without Contrast; Future  -     MRI Cervical Spine With & Without Contrast; Future  -     MRI Thoracic Spine With & Without Contrast; Future  -     CK; Future  -     Lactate Dehydrogenase; Future  -     CK  -     Lactate Dehydrogenase  -     Aldolase    3. Positive TIO (antinuclear antibody)      Elidia does exhibit sensory changes on exam bilaterally, could possibly be a small fiber neuropathy involved. Message sent to Lisa Barahona NP about completing a small fiber biopsy.  Will also check peripheral neuropathy labs to rule out reversible causes of her symptoms.  Will also check CK and LDH.  Question of a possible autoimmune transverse myelitis therefore MRI of brain, cervical and thoracic spine ordered.  She has a referral to rheumatology and I agree this is a good next step as she does have history of autoimmune disorders.    We will see Elidia back after testing is completed, sooner if symptoms warrant.     YAS Royal  [facility]   05/31/24       I spent 45 minutes caring for Mariajose on this date of service. This time includes time spent by me in the following activities:preparing for the visit, reviewing tests, obtaining and/or reviewing a separately obtained history, performing a medically appropriate examination and/or evaluation , counseling and educating the patient/family/caregiver, ordering medications, tests, or procedures, referring and communicating with other health care professionals , documenting information in the medical record, independently interpreting results and communicating that information with the  patient/family/caregiver, and care coordination  Follow Up   No follow-ups on file.  Patient was given instructions and counseling regarding her condition or for health maintenance advice. Please see specific information pulled into the AVS if appropriate.       Dictated using Dragon Dictation

## 2024-05-31 ENCOUNTER — TELEPHONE (OUTPATIENT)
Dept: NEUROLOGY | Facility: CLINIC | Age: 74
End: 2024-05-31
Payer: MEDICARE

## 2024-05-31 LAB
ALBUMIN SERPL ELPH-MCNC: 4 G/DL (ref 2.9–4.4)
ALBUMIN/GLOB SERPL: 1.7 {RATIO} (ref 0.7–1.7)
ALPHA1 GLOB SERPL ELPH-MCNC: 0.2 G/DL (ref 0–0.4)
ALPHA2 GLOB SERPL ELPH-MCNC: 0.6 G/DL (ref 0.4–1)
B-GLOBULIN SERPL ELPH-MCNC: 0.9 G/DL (ref 0.7–1.3)
CK SERPL-CCNC: 79 U/L (ref 20–180)
GAMMA GLOB SERPL ELPH-MCNC: 0.5 G/DL (ref 0.4–1.8)
GLIADIN PEPTIDE IGA SER-ACNC: 7 UNITS (ref 0–19)
GLOBULIN SER CALC-MCNC: 2.3 G/DL (ref 2.2–3.9)
IGA SERPL-MCNC: 351 MG/DL (ref 64–422)
KAPPA LC FREE SER-MCNC: 11.6 MG/L (ref 3.3–19.4)
KAPPA LC FREE/LAMBDA FREE SER: 0.23 {RATIO} (ref 0.26–1.65)
LABORATORY COMMENT REPORT: NORMAL
LAMBDA LC FREE SERPL-MCNC: 51 MG/L (ref 5.7–26.3)
LDH SERPL-CCNC: 84 U/L (ref 135–214)
M PROTEIN SERPL ELPH-MCNC: NORMAL G/DL
PROT PATTERN SERPL ELPH-IMP: NORMAL
PROT SERPL-MCNC: 6.3 G/DL (ref 6–8.5)
TTG IGA SER-ACNC: <2 U/ML (ref 0–3)

## 2024-06-02 LAB — PYRIDOXAL PHOS SERPL-MCNC: 25.1 UG/L (ref 3.4–65.2)

## 2024-06-03 LAB
ALDOLASE SERPL-CCNC: 2.6 U/L (ref 3.3–10.3)
VIT B1 BLD-SCNC: 142.8 NMOL/L (ref 66.5–200)

## 2024-06-04 LAB
A-TOCOPHEROL VIT E SERPL-MCNC: 19.2 MG/L (ref 9–29)
COPPER SERPL-MCNC: 104 UG/DL (ref 80–158)
GAMMA TOCOPHEROL SERPL-MCNC: 0.2 MG/L (ref 0.5–4.9)

## 2024-06-05 DIAGNOSIS — R89.9 ABNORMAL LABORATORY TEST RESULT: Primary | ICD-10-CM

## 2024-06-05 LAB
ARSENIC BLD-MCNC: 2 UG/L (ref 0–9)
LEAD BLDV-MCNC: <1 UG/DL (ref 0–3.4)
MERCURY BLD-MCNC: <1 UG/L (ref 0–14.9)

## 2024-06-24 ENCOUNTER — HOSPITAL ENCOUNTER (OUTPATIENT)
Dept: MRI IMAGING | Facility: HOSPITAL | Age: 74
Discharge: HOME | End: 2024-06-24
Payer: MEDICARE

## 2024-06-24 DIAGNOSIS — R20.0 NUMBNESS IN BOTH LEGS: ICD-10-CM

## 2024-06-24 DIAGNOSIS — M62.89 PERIPHERAL MUSCLE FATIGUE: ICD-10-CM

## 2024-06-24 PROCEDURE — 70553 MRI BRAIN STEM W/O & W/DYE: CPT

## 2024-06-24 PROCEDURE — 72156 MRI NECK SPINE W/O & W/DYE: CPT

## 2024-06-24 PROCEDURE — 72157 MRI CHEST SPINE W/O & W/DYE: CPT

## 2024-06-24 PROCEDURE — 25010000002 GADOTERIDOL PER 1 ML

## 2024-06-24 PROCEDURE — A9579 GAD-BASE MR CONTRAST NOS,1ML: HCPCS

## 2024-06-24 RX ADMIN — GADOTERIDOL 15 ML: 279.3 INJECTION, SOLUTION INTRAVENOUS at 13:38

## 2024-06-25 ENCOUNTER — TELEPHONE (OUTPATIENT)
Dept: NEUROLOGY | Facility: CLINIC | Age: 74
End: 2024-06-25
Payer: MEDICARE

## 2024-06-25 NOTE — TELEPHONE ENCOUNTER
----- Message from Lisa Barahona sent at 6/17/2024  6:39 PM EDT -----  This patient's benefit verification for skin biopsy $0 out-of-pocket.  They are able to schedule at their convenience.    Thanks

## 2024-06-27 DIAGNOSIS — M54.12 CERVICAL RADICULOPATHY: ICD-10-CM

## 2024-06-27 DIAGNOSIS — M48.02 CERVICAL SPINAL STENOSIS: Primary | ICD-10-CM

## 2024-07-15 NOTE — PROGRESS NOTES
Subjective   History of Present Illness: Mariajose Tabor is a 74 y.o. female is being seen for consultation today at the request of YAS Royal for Cervical and Thoracic Radiculopathy. Today patient reports neck  and leg pain. She has seen Rheumatology, Neurology and Vascular.  In terms of the patient's neck, she has chronic neck pain as well as pain that will radiate into her occipital region causing headaches.  She has had upper extremity pain before but this has been years ago and got better with epidurals.  Currently she denies any significant upper extremity symptoms including no weakness numbness paresthesias or pain.  No difficulty using her hands or dropping things.  She does complain of some mild balance issues.    In terms of her legs, she complains of burning and churning sort of feeling with occasional spasms.  The pain will radiate and can be associated with numbness and tingling.  She has been extensively worked up for this, with lab work pending from neurology and she is also planning to see rheumatology.  Vascular surgery has ruled out vascular claudication.  Pain can occur at any time but primarily standing but improves when she is walking.  No overt weakness.    Chief Complaint   Patient presents with    Back Pain    Neck Pain    Arm Pain    Leg Pain          Previous treatment: NSAID'S, Gabapentin, PT in the past,    Previous neurosurgery:  None    Previous injections: Nothing recent,    The following portions of the patient's history were reviewed and updated as appropriate: allergies, current medications, past family history, past medical history, past social history, past surgical history, and problem list.    Review of Systems   Constitutional:  Positive for activity change.   HENT: Negative.     Eyes: Negative.    Respiratory:  Negative for chest tightness and shortness of breath.    Cardiovascular: Negative.    Gastrointestinal: Negative.    Endocrine: Negative.    Genitourinary:  "Negative.    Musculoskeletal:  Positive for arthralgias, back pain, gait problem, myalgias and neck pain.        + arm and leg pain   Skin: Negative.    Allergic/Immunologic: Negative.    Neurological:  Positive for numbness.   Hematological: Negative.        Objective      /68   Pulse 75   Resp 18   Ht 160 cm (62.99\")   Wt 58.1 kg (128 lb)   SpO2 97%   BMI 22.68 kg/m²    Body mass index is 22.68 kg/m².  There were no vitals filed for this visit.        Neurologic Exam     Mental Status   Oriented to person, place, and time.     Motor Exam     Strength   Strength 5/5 throughout.     Sensory Exam   Light touch normal.     Gait, Coordination, and Reflexes     Reflexes   Right Reid: absent  Left Reid: absent      Assessment & Plan   Independent Review of Radiographic Studies:      I personally reviewed and interpreted the images from the following studies.    MRI cervical spine: Degenerative changes most severe from C3-C6 where there is moderate to severe stenosis at C3-4 and C5-6 and severe stenosis at C4-5.  There is foraminal stenosis at these levels as well.  No other high-grade central or foraminal stenosis    MRI thoracic spine: No high-grade stenosis or spinal cord compression or abnormal signal in the spinal cord    MRI lumbar spine: Multilevel degenerative changes without high-grade central stenosis    Medical Decision Making:      Mariajose Tabor is a 74 y.o. female with a history of neck pain and occipital headaches who is primarily issues at this time are lower extremity burning tightness spasms and numbness.  I do not have an obvious explanation for the symptoms based on the patient's imaging.  It is possible that her cervical stenosis could be contributing to this, however I would expect her to have more significant upper extremity symptoms.  I encouraged her to continue workup with neurology and rheumatology.  The patient has significant central stenosis in her cervical spine.  I do not " think she is overtly symptomatic at this time, but the degree of stenosis is concerning.  Given her increased risk of spinal cord injury or progressive symptomology in the future I recommend C3-6 ACDF.  Patient would like to think about surgery and also understands that it is unlikely this will significantly affect her lower extremity problems.  She will call return to clinic if she would like to proceed with surgery.      Diagnoses and all orders for this visit:    1. Cervical stenosis of spine (Primary)    2. DDD (degenerative disc disease), cervical    3. Lumbar degenerative disc disease      No follow-ups on file.    This patient was examined wearing appropriate personal protective equipment.                      Dr. Pedro Liu IV    07/17/24  10:44 EDT

## 2024-07-17 ENCOUNTER — PATIENT ROUNDING (BHMG ONLY) (OUTPATIENT)
Dept: NEUROSURGERY | Facility: CLINIC | Age: 74
End: 2024-07-17
Payer: MEDICARE

## 2024-07-17 ENCOUNTER — OFFICE VISIT (OUTPATIENT)
Dept: NEUROSURGERY | Facility: CLINIC | Age: 74
End: 2024-07-17
Payer: MEDICARE

## 2024-07-17 VITALS
WEIGHT: 128 LBS | RESPIRATION RATE: 18 BRPM | DIASTOLIC BLOOD PRESSURE: 68 MMHG | HEART RATE: 75 BPM | OXYGEN SATURATION: 97 % | BODY MASS INDEX: 22.68 KG/M2 | SYSTOLIC BLOOD PRESSURE: 135 MMHG | HEIGHT: 63 IN

## 2024-07-17 DIAGNOSIS — M50.30 DDD (DEGENERATIVE DISC DISEASE), CERVICAL: ICD-10-CM

## 2024-07-17 DIAGNOSIS — M51.36 LUMBAR DEGENERATIVE DISC DISEASE: ICD-10-CM

## 2024-07-17 DIAGNOSIS — M48.02 CERVICAL STENOSIS OF SPINE: Primary | ICD-10-CM

## 2024-07-17 NOTE — PROGRESS NOTES
A My-Chart message has been sent to the patient for PATIENT ROUNDING with [facility] [place]

## 2024-07-18 ENCOUNTER — TELEPHONE (OUTPATIENT)
Dept: NEUROSURGERY | Facility: CLINIC | Age: 74
End: 2024-07-18
Payer: MEDICARE

## 2024-07-18 NOTE — TELEPHONE ENCOUNTER
PATIENT CALLED AND STATES SHE WAS IN AND SEEN DR. RINCON 07/17/2024 AND SHE HAS DECIDED TO PROCEED WITH SURGERY.     PLEASE CALL PATIENT TO INFORM OF NEXT STEPS MOVING FORWARD  THANK YOU

## 2024-07-22 ENCOUNTER — PREP FOR SURGERY (OUTPATIENT)
Dept: OTHER | Facility: HOSPITAL | Age: 74
End: 2024-07-22
Payer: MEDICARE

## 2024-07-22 DIAGNOSIS — R79.1 ABNORMAL COAGULATION PROFILE: ICD-10-CM

## 2024-07-22 DIAGNOSIS — M48.02 CERVICAL STENOSIS OF SPINE: Primary | ICD-10-CM

## 2024-07-22 DIAGNOSIS — R79.9 ABNORMAL FINDING OF BLOOD CHEMISTRY, UNSPECIFIED: ICD-10-CM

## 2024-07-24 NOTE — TELEPHONE ENCOUNTER
CALLED PATIENT LET HER KNOW AUTH IS PENDING WITH [facility] ONCE IT IS APPROVED I WILL CALL TO SCHEDULE. PATIENT VERBALLY UNDERSTOOD

## 2024-07-29 ENCOUNTER — PROCEDURE VISIT (OUTPATIENT)
Dept: NEUROLOGY | Facility: CLINIC | Age: 74
End: 2024-07-29
Payer: MEDICARE

## 2024-07-29 VITALS — DIASTOLIC BLOOD PRESSURE: 62 MMHG | OXYGEN SATURATION: 98 % | HEART RATE: 74 BPM | SYSTOLIC BLOOD PRESSURE: 136 MMHG

## 2024-07-29 DIAGNOSIS — R20.0 NUMBNESS IN BOTH LEGS: Primary | ICD-10-CM

## 2024-07-29 PROCEDURE — 11104 PUNCH BX SKIN SINGLE LESION: CPT

## 2024-07-29 PROCEDURE — 11105 PUNCH BX SKIN EA SEP/ADDL: CPT

## 2024-07-29 NOTE — PROGRESS NOTES
"Skin Biopsy Procedure Note    PRE-OP DIAGNOSIS: Numbness in both legs  POST-OP DIAGNOSIS: Same     PROCEDURE: punch skin biopsy    Performing Provider: YAS Hurtado    Reason for Biopsy/Brief Clinical History: Mariajose Tabor is a  74 y.o.  female who was prepared by Leticia Wang NP for intraepidermal nerve fiber density skin biopsy.  Patient reports a weird painful sensation in the legs that is sometimes associated with weakness.  She reports it is worse with prolonged standing or walking.  She reports intermittent numbness and burning in her lower extremities that varies in intensity day-to-day.  She describes the sensation as if the \"blood was churning\" in her lower legs that is worse at night.  She reports sensitivity to clothing touching her skin and cannot tolerate closed toed shoes.  She reports dry eyes but no dry mouth.  She reports reduced sweating.  She denies any family history of neuropathy or heart failure without an MRI.  She had an EMG of the lower extremities on 1/23/2024 which was normal.  She did have a positive TIO with a titer of 1-80 with a homogeneous pattern.  She has a history of vitiligo and lichen planus affecting her scalp and fingernails.  TSH 2.930, vitamin B12 greater than 2000, folate greater than 20, CRP less than 30, copper 104, vitamin B1 142.8, vitamin B6 25.1, sed rate 7, elevated free lambda light chains and low kappa/lambda ratio,, RPR nonreactive, Sjogren's antibodies negative and heavy metals negative.  Patient had MRI of the cervical spine which showed severe central canal stenosis at C3-4, C4-5, C5-6 with associated mild flattening of the cervical cord.  She states she is scheduled to have surgery in September.      Biopsy site:      Distal Thigh (Right)  10 cm above lateral knee    Distal Leg (Right)  10 cm above the lateral malleolus         After obtaining informed consent, the area was prepped and draped in the usual fashion.     Anesthesia was obtained " with 2% lidocaine with epinephrine.     A full thickness punch biopsy was obtained at each site with a 3mm punch. Gauze and bandage were applied and hemostasis was assured. The patient tolerated the procedure well.    Wound care discussed and handout with home instructions given to patient.    2 Specimen(s) were placed in each vial of fixative and packaged appropriately to be sent for processing at [facility].      [employer] Fed-ex pick-up number  - [id number]    Kit Number- [id number]  Expiration Date-  4/19/2025

## 2024-07-29 NOTE — PATIENT INSTRUCTIONS
CARING FOR YOUR BIOPSY SITE    One or more of your biopsy sites may continue to bleed for the rest of the day.  If bleeding persists after 48 hours, you should continue to keep the biopsy site dry and refrain from soaking.  Each biopsy site may or may not form a scab in a few days.  While the biopsy sites heal, make sure to:    Always keep the biopsy areas clean and dry.  Do not soak in water for 48 hours: No swimming, no hot tubs, no baths.  Showers are permitted within the first 24 hours, but avoid direct, prolonged water contact with the biopsy sites.  Change the bandages whenever they become wet or damp.  We recommend that you apply Aquaphor or Vaseline to the Band-Aid.  Keep the wound covered with Aquaphor or Vaseline at all times for 2 weeks.  For each biopsy site, a bandage is no longer necessary if: A scab has formed or new skin begins to grow over the biopsy sites and the bleeding has stopped.      1 or more biopsy sites may form a small bump or a small indent, and could form a small scar.  But in most cases, after a few months pass, you will not be able to notice from where the biopsies were taken.      ADDITIONAL INFORMATION:    Your biopsy site should not be particularly painful.  You can apply ice to the area or take Tylenol for discomfort.  Do not take Motrin, ibuprofen, or aspirin.  A significant increase in pain may indicate a problem.  Call the office if this occurs.      WHEN TO CALL YOUR CLINICIAN WHO PERFORMED THE BIOPSY:    Redness around 1 or more of the biopsy sites that has spread significantly.  There is discharge coming from the biopsy site.  You have a fever of 100 °F or higher.  Swelling  If bleeding occurs which you cannot stop with firm pressure for 20 minutes      WHO TO CALL:    Call [facility] Office at [phone] for problems.  Ask for your physician and inform the staff if you have a problem with your biopsy site.

## 2024-08-01 PROBLEM — M48.02 CERVICAL STENOSIS OF SPINE: Status: ACTIVE | Noted: 2024-07-22

## 2024-08-02 ENCOUNTER — TELEPHONE (OUTPATIENT)
Dept: ONCOLOGY | Facility: CLINIC | Age: 74
End: 2024-08-02

## 2024-08-02 NOTE — TELEPHONE ENCOUNTER
"Caller: Mariajose Tabor \"HEAVEN\"    Relationship to patient: Self    Best call back number: [phone]    Chief complaint: CANCEL - R/S     Type of visit: NEW PAT LAB & HEMATOLOGY     Requested date: LAST WEEK  OF NOVEMBER AS SHE IS HAVING SURGERY & WILL NOT BE ABLE TO MAKE IT UNTIL THEN.    If rescheduling, when is the original appointment: 10/4/24     Additional notes:TOO FAR OUT FOR HUB TO R/S                "

## 2024-08-05 ENCOUNTER — TELEPHONE (OUTPATIENT)
Dept: ONCOLOGY | Facility: CLINIC | Age: 74
End: 2024-08-05
Payer: MEDICARE

## 2024-08-05 NOTE — TELEPHONE ENCOUNTER
"Caller: Mariajose Tabor \"HEAVEN\"    Relationship to patient: Self    Best call back number: [phone]    Type of visit: NEW PT    Requested date: END OF OCTOBER, MORNING APPT OR EARLY AFTERNOON     If rescheduling, when is the original appointment: 11/27     "

## 2024-08-05 NOTE — TELEPHONE ENCOUNTER
Called pt and explained our new pt situation- pt actually lives in [place] and would like to see about getting in w/ Jesse- referral sent back to ref office- pt has my name if I can help with anything else

## 2024-08-12 ENCOUNTER — TELEPHONE (OUTPATIENT)
Dept: NEUROLOGY | Facility: CLINIC | Age: 74
End: 2024-08-12
Payer: MEDICARE

## 2024-08-12 NOTE — TELEPHONE ENCOUNTER
Caller: Mariajose Tabor    Best call back number:   Telephone Information:   Mobile [phone]         What is the medical concern/diagnosis: Heme/Onc for the elevated free lambda light chains.     What specialty or service is being requested: HEMEATOLOGY    What is the provider, practice or medical service name: [facility]    What is the office location: [place]    Any additional details: DUE TO DISTANCE PT WOULD LIKE TO HAVE THIS REFERRAL RE-ROUTED TO [place] PLEASE.

## 2024-08-12 NOTE — TELEPHONE ENCOUNTER
Patient is scheduled to see Dr. Kidd on 11/27 at [facility] in [place]. I've rerouted the referral to [facility].

## 2024-08-22 ENCOUNTER — TELEPHONE (OUTPATIENT)
Dept: ONCOLOGY | Facility: CLINIC | Age: 74
End: 2024-08-22

## 2024-08-22 ENCOUNTER — TELEPHONE (OUTPATIENT)
Dept: NEUROSURGERY | Facility: CLINIC | Age: 74
End: 2024-08-22
Payer: MEDICARE

## 2024-08-22 NOTE — TELEPHONE ENCOUNTER
Patient is aware of the message. She see's Hematology on 9/11/2024. She will call us after seeing them and we can determine what is best for the patient.

## 2024-08-23 ENCOUNTER — TELEPHONE (OUTPATIENT)
Dept: NEUROLOGY | Facility: CLINIC | Age: 74
End: 2024-08-23
Payer: MEDICARE

## 2024-08-23 NOTE — TELEPHONE ENCOUNTER
YOANNAM letting patient know that her new referral is in progress. Also, that her testing was normal and that she should keep her September appt with Leticia.    HUB CAN READ

## 2024-09-06 NOTE — PROGRESS NOTES
HEMATOLOGY ONCOLOGY OUTPATIENT CONSULTATION       Patient name: Mariajose Tabor  : 1950  MRN: [id number]  Primary Care Physician: Lilia George MD  Referring Physician: No ref. provider found  Reason For Consult: Abnormal light chain quantification.     History of Present Illness:  Patient is a 74 y.o.     2024: For the first time referred by her neurologist who, in the process of investigating symptoms of neuropathy, obtain protein electrophoresis and light chain quantification in serum. She was found to have a small excess of lambda light chains; the protein electrophoresis had no monoclonal spike. She had no previous history of a monoclonal gammopathy. She reported a history of cervical myelopathy secondary to spinal stenosis. She also reported a year and a half of dysesthesia and allodynia of the lower extremities. She described weakness that could be made worse by prolonged periods of standing up. She described having to sit down to relieve this pain and having to have her feet on the ground to relieve it. She had not had any weight loss. She denied nocturnal diaphoresis or fevers. She had a history of chronic back pain but it had not been worse. Reportedly an electromyography was unremarkable. On exam alert and conversant. No distress and no jaundice. No palpable adenopathy. Lungs diminished bilaterally and heart regular. Abdomen without hepatomegaly or splenomegaly. No edema. Reviewed the laboratory exams and discussed with her. It would appear she has monoclonal gammopathy of undetermined significance. Will investigate further.     Past Medical History:   Diagnosis Date    GERD (gastroesophageal reflux disease)     Hypertension     Migraine     Muscle weakness (generalized) 2023    Pain 2023    in right leg    Pain in left leg 2023    Paresthesia of skin 2023    Peripheral neuropathy 3-     Past Surgical History:   Procedure Laterality  Date    BASAL CELL CARCINOMA EXCISION  04/10/2024    CARPAL TUNNEL RELEASE      CATARACT EXTRACTION      COLONOSCOPY      DILATATION AND CURETTAGE      ENDOSCOPY N/A 07/07/2021    Procedure: ESOPHAGOGASTRODUODENOSCOPY with gastric biopsy x 2;  Surgeon: Ana Muñiz MD;  Location: [facility] ENDOSCOPY;  Service: Gastroenterology;  Laterality: N/A;  gastric nodule,  hiatal hernia    EXPLORATORY LAPAROTOMY N/A 03/02/2021    Procedure: LAPAROTOMY EXPLORATORY with RIGHT COLON RESECTION;  Surgeon: Pedro Wren DO;  Location: [facility] MAIN OR;  Service: General;  Laterality: N/A;    HERNIA REPAIR      THUMB ARTHROSCOPY Bilateral     thumb fusion       Current Outpatient Medications:     alendronate (FOSAMAX) 70 MG tablet, TAKE 1 TABLET BY MOUTH ONCE A WEEK IN THE MORNING AT LEAST 30 MINUTES BEFORE FIRST FOOD, BEVERAGE OR MEDICATION OF THE DAY, Disp: , Rfl:     aspirin 81 MG EC tablet, Take 1 tablet by mouth Daily., Disp: , Rfl:     benazepril (LOTENSIN) 20 MG tablet, Take 1 tablet by mouth Daily., Disp: , Rfl:     Biotin 10 MG capsule, Take  by mouth., Disp: , Rfl:     calcium carbonate (OS-LILIANA) 600 MG tablet, Take 1 tablet by mouth 2 (Two) Times a Day With Meals., Disp: , Rfl:     clobetasol (TEMOVATE) 0.05 % cream, Apply  topically to the appropriate area as directed Every Night. Scalp, Disp: , Rfl:     doxycycline (PERIOSTAT) 20 MG tablet, TAKE 1 TABLET BY MOUTH WITH BREAKFAST AND 1 ONCE DAILY WITH SUPPER, Disp: , Rfl:     gabapentin (NEURONTIN) 300 MG capsule, Take 1 capsule by mouth 3 (Three) Times a Day., Disp: , Rfl:     hydroCHLOROthiazide (HYDRODIURIL) 12.5 MG tablet, Take 1 tablet by mouth Daily., Disp: , Rfl:     meloxicam (MOBIC) 15 MG tablet, Take 1 tablet by mouth Daily., Disp: , Rfl:     Multiple Vitamins-Minerals (PRESERVISION AREDS 2 PO), Take 2 tablets by mouth Daily., Disp: , Rfl:     NON FORMULARY / PATIENT SUPPLIED MEDICATION, Insert 2 mL into the vagina 2 (Two) Times a Week. Estriol 0.5% cream-    Insert 2ml twice weekly on  and , Disp: , Rfl:     omeprazole (priLOSEC) 40 MG capsule, Take 1 capsule by mouth Daily. before a meal, Disp: , Rfl:     traZODone (DESYREL) 150 MG tablet, Take 2 tablets by mouth every night at bedtime., Disp: , Rfl:     vitamin B-12 (CYANOCOBALAMIN) 1000 MCG tablet, Take 1 tablet by mouth Daily., Disp: , Rfl:     vitamin C (ASCORBIC ACID) 250 MG tablet, Take 1 tablet by mouth Daily., Disp: , Rfl:     No Known Allergies    Family History   Problem Relation Age of Onset    Heart disease Mother     Stomach cancer Father     Heart disease Brother      Cancer-related family history includes Stomach cancer in her father.    Social History     Tobacco Use    Smoking status: Former     Current packs/day: 0.00     Average packs/day: 0.3 packs/day for 19.2 years (5.8 ttl pk-yrs)     Types: Cigarettes     Start date:      Quit date: 3/22/2011     Years since quittin.4     Passive exposure: Past    Smokeless tobacco: Never   Vaping Use    Vaping status: Never Used   Substance Use Topics    Alcohol use: Not Currently     Alcohol/week: 7.0 standard drinks of alcohol    Drug use: Not Currently     Types: Marijuana     Social History     Social History Narrative    Not on file     ROS:   Review of Systems   Constitutional:  Negative for activity change, appetite change, chills, diaphoresis, fatigue, fever and unexpected weight change.   HENT:  Negative for congestion, dental problem, drooling, ear discharge, ear pain, facial swelling, hearing loss, mouth sores, nosebleeds, postnasal drip, rhinorrhea, sinus pressure, sinus pain, sneezing, sore throat, tinnitus, trouble swallowing and voice change.    Eyes:  Negative for photophobia, pain, discharge, redness, itching and visual disturbance.   Respiratory:  Negative for apnea, cough, choking, chest tightness, shortness of breath, wheezing and stridor.    Cardiovascular:  Negative for chest pain, palpitations and leg swelling.  "  Gastrointestinal:  Negative for abdominal distention, abdominal pain, anal bleeding, blood in stool, constipation, diarrhea, nausea, rectal pain and vomiting.   Endocrine: Negative for cold intolerance, heat intolerance, polydipsia and polyuria.   Genitourinary:  Negative for decreased urine volume, difficulty urinating, dysuria, flank pain, frequency, genital sores, hematuria and urgency.   Musculoskeletal:  Negative for arthralgias, back pain, gait problem, joint swelling, myalgias, neck pain and neck stiffness.   Skin:  Negative for color change, pallor and rash.   Neurological:  Positive for weakness and numbness. Negative for dizziness, tremors, seizures, syncope, facial asymmetry, speech difficulty, light-headedness and headaches.   Hematological:  Negative for adenopathy. Does not bruise/bleed easily.   Psychiatric/Behavioral:  Negative for agitation, behavioral problems, confusion, decreased concentration, hallucinations, self-injury, sleep disturbance and suicidal ideas. The patient is not nervous/anxious.        Objective:    Vital Signs:  Vitals:    09/11/24 1121   BP: 133/71   Pulse: 68   Resp: 18   Temp: 98.2 °F (36.8 °C)   SpO2: 99%   Weight: 57.2 kg (126 lb 3.2 oz)   Height: 160 cm (63\")   PainSc: 0-No pain     Body mass index is 22.36 kg/m².    ECOG  (1) Restricted in physically strenuous activity, ambulatory and able to do work of light nature    Physical Exam:   Physical Exam  Constitutional:       General: She is not in acute distress.     Appearance: She is not ill-appearing, toxic-appearing or diaphoretic.      Comments: Well built, slender and in no distress. No jaundice.    HENT:      Head: Normocephalic and atraumatic.      Right Ear: External ear normal.      Left Ear: External ear normal.      Nose: Nose normal.      Mouth/Throat:      Mouth: Mucous membranes are moist.      Pharynx: Oropharynx is clear. No oropharyngeal exudate or posterior oropharyngeal erythema.   Eyes:      General: " No scleral icterus.        Right eye: No discharge.         Left eye: No discharge.      Conjunctiva/sclera: Conjunctivae normal.      Pupils: Pupils are equal, round, and reactive to light.   Cardiovascular:      Rate and Rhythm: Normal rate and regular rhythm.      Pulses: Normal pulses.      Heart sounds: No murmur heard.     No friction rub. No gallop.   Pulmonary:      Effort: No respiratory distress.      Breath sounds: No stridor. No wheezing, rhonchi or rales.   Abdominal:      General: Bowel sounds are normal. There is no distension.      Palpations: Abdomen is soft. There is no mass.      Tenderness: There is no abdominal tenderness. There is no right CVA tenderness, left CVA tenderness, guarding or rebound.      Hernia: No hernia is present.   Musculoskeletal:         General: No tenderness, deformity or signs of injury.      Cervical back: No rigidity.      Right lower leg: No edema.      Left lower leg: No edema.   Lymphadenopathy:      Cervical: No cervical adenopathy.   Skin:     Coloration: Skin is not jaundiced or pale.      Findings: No bruising, lesion or rash.   Neurological:      General: No focal deficit present.      Mental Status: She is alert and oriented to person, place, and time.      Cranial Nerves: No cranial nerve deficit.   Psychiatric:         Mood and Affect: Mood normal.         Behavior: Behavior normal.         Thought Content: Thought content normal.         Judgment: Judgment normal.     CANDY Real MD performed the physical exam on 9/11/2024 as documented above.    Lab Results - Last 18 Months   Lab Units 09/11/24  1102 12/04/23  1040   WBC 10*3/mm3 4.27 4.79   HEMOGLOBIN g/dL 11.7* 12.5   HEMATOCRIT % 36.7 38.4   PLATELETS 10*3/mm3 313 328   MCV fL 91.5 91.6     Lab Results - Last 18 Months   Lab Units 12/04/23  1040   SODIUM mmol/L 135*   POTASSIUM mmol/L 4.9   CHLORIDE mmol/L 98   CO2 mmol/L 30.0*   BUN mg/dL 14   CREATININE mg/dL 0.86   CALCIUM mg/dL 9.3   BILIRUBIN  mg/dL 0.3   ALK PHOS U/L 80   ALT (SGPT) U/L 10   AST (SGOT) U/L 15   GLUCOSE mg/dL 87     Lab Results   Component Value Date    GLUCOSE 87 12/04/2023    BUN 14 12/04/2023    CREATININE 0.86 12/04/2023    EGFRIFNONA 77 07/07/2021    BCR 16.3 12/04/2023    K 4.9 12/04/2023    CO2 30.0 (H) 12/04/2023    CALCIUM 9.3 12/04/2023    PROTENTOTREF 6.3 05/30/2024    ALBUMIN 4.0 05/30/2024    LABIL2 1.7 05/30/2024    AST 15 12/04/2023    ALT 10 12/04/2023     Lab Results   Component Value Date    FOLATE >20.00 05/30/2024     Lab Results   Component Value Date    LMRINUMT52 >2,000 (H) 05/30/2024     Lab Results   Component Value Date    SPEP Comment 05/30/2024     LDH   Date Value Ref Range Status   05/30/2024 84 (L) 135 - 214 U/L Final     Lab Results   Component Value Date    TIO Positive (A) 12/04/2023    SEDRATE 7 05/30/2024     Lab Results   Component Value Date    SEDRATE 7 05/30/2024      Assessment & Plan     Monoclonal gammopathy: to investigate further. I don't believe there is any suggestion of a progressive monoclonal gammopathy. Laboratory exams sent.   Neuropathy? Unclear cause. She is still having investigations.   Anemia: to investigate further.   Reviewed all the records including notes from neurology and primary care. Reviewed the laboratory exams available.   See me in 4 weeks with results.     Elroy Real MD on 9/11/2024 at 12:57 PM.    Seizure disorder

## 2024-09-11 ENCOUNTER — HOSPITAL ENCOUNTER (OUTPATIENT)
Dept: GENERAL RADIOLOGY | Facility: HOSPITAL | Age: 74
Discharge: HOME | End: 2024-09-11
Payer: MEDICARE

## 2024-09-11 ENCOUNTER — APPOINTMENT (OUTPATIENT)
Dept: LAB | Facility: HOSPITAL | Age: 74
End: 2024-09-11
Payer: MEDICARE

## 2024-09-11 ENCOUNTER — CONSULT (OUTPATIENT)
Dept: ONCOLOGY | Facility: CLINIC | Age: 74
End: 2024-09-11
Payer: MEDICARE

## 2024-09-11 VITALS
WEIGHT: 126.2 LBS | OXYGEN SATURATION: 99 % | RESPIRATION RATE: 18 BRPM | HEART RATE: 68 BPM | SYSTOLIC BLOOD PRESSURE: 133 MMHG | DIASTOLIC BLOOD PRESSURE: 71 MMHG | BODY MASS INDEX: 22.36 KG/M2 | TEMPERATURE: 98.2 F | HEIGHT: 63 IN

## 2024-09-11 DIAGNOSIS — D47.2 MGUS (MONOCLONAL GAMMOPATHY OF UNKNOWN SIGNIFICANCE): ICD-10-CM

## 2024-09-11 DIAGNOSIS — R20.2 PARESTHESIA: Primary | ICD-10-CM

## 2024-09-11 DIAGNOSIS — R79.89 ABNORMAL CBC: Primary | ICD-10-CM

## 2024-09-11 LAB
ALBUMIN SERPL-MCNC: 4.5 G/DL (ref 3.5–5.2)
ALBUMIN/GLOB SERPL: 1.9 G/DL
ALP SERPL-CCNC: 73 U/L (ref 39–117)
ALT SERPL W P-5'-P-CCNC: 6 U/L (ref 1–33)
ANION GAP SERPL CALCULATED.3IONS-SCNC: 10.4 MMOL/L (ref 5–15)
AST SERPL-CCNC: 24 U/L (ref 1–32)
BASOPHILS # BLD AUTO: 0.02 10*3/MM3 (ref 0–0.2)
BASOPHILS NFR BLD AUTO: 0.5 % (ref 0–1.5)
BILIRUB SERPL-MCNC: 0.2 MG/DL (ref 0–1.2)
BUN SERPL-MCNC: 17 MG/DL (ref 8–23)
BUN/CREAT SERPL: 21.5 (ref 7–25)
CALCIUM SPEC-SCNC: 9.3 MG/DL (ref 8.6–10.5)
CHLORIDE SERPL-SCNC: 95 MMOL/L (ref 98–107)
CO2 SERPL-SCNC: 28.6 MMOL/L (ref 22–29)
CREAT SERPL-MCNC: 0.79 MG/DL (ref 0.57–1)
DEPRECATED RDW RBC AUTO: 44.1 FL (ref 37–54)
EGFRCR SERPLBLD CKD-EPI 2021: 79.6 ML/MIN/1.73
EOSINOPHIL # BLD AUTO: 0.07 10*3/MM3 (ref 0–0.4)
EOSINOPHIL NFR BLD AUTO: 1.6 % (ref 0.3–6.2)
ERYTHROCYTE [DISTWIDTH] IN BLOOD BY AUTOMATED COUNT: 13.4 % (ref 12.3–15.4)
GLOBULIN UR ELPH-MCNC: 2.4 GM/DL
GLUCOSE SERPL-MCNC: 86 MG/DL (ref 65–99)
HCT VFR BLD AUTO: 36.7 % (ref 34–46.6)
HGB BLD-MCNC: 11.7 G/DL (ref 12–15.9)
HOLD SPECIMEN: NORMAL
HOLD SPECIMEN: NORMAL
LYMPHOCYTES # BLD AUTO: 1.01 10*3/MM3 (ref 0.7–3.1)
LYMPHOCYTES NFR BLD AUTO: 23.7 % (ref 19.6–45.3)
MCH RBC QN AUTO: 29.2 PG (ref 26.6–33)
MCHC RBC AUTO-ENTMCNC: 31.9 G/DL (ref 31.5–35.7)
MCV RBC AUTO: 91.5 FL (ref 79–97)
MONOCYTES # BLD AUTO: 0.49 10*3/MM3 (ref 0.1–0.9)
MONOCYTES NFR BLD AUTO: 11.5 % (ref 5–12)
NEUTROPHILS NFR BLD AUTO: 2.68 10*3/MM3 (ref 1.7–7)
NEUTROPHILS NFR BLD AUTO: 62.7 % (ref 42.7–76)
PLATELET # BLD AUTO: 313 10*3/MM3 (ref 140–450)
PMV BLD AUTO: 8.6 FL (ref 6–12)
POTASSIUM SERPL-SCNC: 4.4 MMOL/L (ref 3.5–5.2)
PROT SERPL-MCNC: 6.9 G/DL (ref 6–8.5)
RBC # BLD AUTO: 4.01 10*6/MM3 (ref 3.77–5.28)
SODIUM SERPL-SCNC: 134 MMOL/L (ref 136–145)
WBC NRBC COR # BLD AUTO: 4.27 10*3/MM3 (ref 3.4–10.8)

## 2024-09-11 PROCEDURE — 1160F RVW MEDS BY RX/DR IN RCRD: CPT | Performed by: INTERNAL MEDICINE

## 2024-09-11 PROCEDURE — 80053 COMPREHEN METABOLIC PANEL: CPT | Performed by: INTERNAL MEDICINE

## 2024-09-11 PROCEDURE — 99204 OFFICE O/P NEW MOD 45 MIN: CPT | Performed by: INTERNAL MEDICINE

## 2024-09-11 PROCEDURE — 85025 COMPLETE CBC W/AUTO DIFF WBC: CPT | Performed by: INTERNAL MEDICINE

## 2024-09-11 PROCEDURE — 1126F AMNT PAIN NOTED NONE PRSNT: CPT | Performed by: INTERNAL MEDICINE

## 2024-09-11 PROCEDURE — 3075F SYST BP GE 130 - 139MM HG: CPT | Performed by: INTERNAL MEDICINE

## 2024-09-11 PROCEDURE — 36415 COLL VENOUS BLD VENIPUNCTURE: CPT

## 2024-09-11 PROCEDURE — 1159F MED LIST DOCD IN RCRD: CPT | Performed by: INTERNAL MEDICINE

## 2024-09-11 PROCEDURE — 77075 RADEX OSSEOUS SURVEY COMPL: CPT

## 2024-09-11 PROCEDURE — 3078F DIAST BP <80 MM HG: CPT | Performed by: INTERNAL MEDICINE

## 2024-09-12 ENCOUNTER — PATIENT ROUNDING (BHMG ONLY) (OUTPATIENT)
Dept: ONCOLOGY | Facility: CLINIC | Age: 74
End: 2024-09-12
Payer: MEDICARE

## 2024-09-12 LAB
ALBUMIN SERPL ELPH-MCNC: 3.6 G/DL (ref 2.9–4.4)
ALBUMIN/GLOB SERPL: 1.3 {RATIO} (ref 0.7–1.7)
ALPHA1 GLOB SERPL ELPH-MCNC: 0.2 G/DL (ref 0–0.4)
ALPHA2 GLOB SERPL ELPH-MCNC: 0.7 G/DL (ref 0.4–1)
B-GLOBULIN SERPL ELPH-MCNC: 1.1 G/DL (ref 0.7–1.3)
GAMMA GLOB SERPL ELPH-MCNC: 0.7 G/DL (ref 0.4–1.8)
GLOBULIN SER CALC-MCNC: 2.8 G/DL (ref 2.2–3.9)
IGA SERPL-MCNC: 360 MG/DL (ref 64–422)
IGG SERPL-MCNC: 694 MG/DL (ref 586–1602)
IGM SERPL-MCNC: 20 MG/DL (ref 26–217)
KAPPA LC FREE SER-MCNC: 13 MG/L (ref 3.3–19.4)
KAPPA LC FREE/LAMBDA FREE SER: 0.26 {RATIO} (ref 0.26–1.65)
LABORATORY COMMENT REPORT: ABNORMAL
LAMBDA LC FREE SERPL-MCNC: 50.8 MG/L (ref 5.7–26.3)
M PROTEIN SERPL ELPH-MCNC: 0.6 G/DL
PROT PATTERN SERPL IFE-IMP: ABNORMAL
PROT SERPL-MCNC: 6.4 G/DL (ref 6–8.5)

## 2024-09-12 NOTE — PROGRESS NOTES
September 12, 2024    Hello, may I speak with Mariajose Tabor?    My name is Arianne Cochran      I am  with [facility]  [address] [place] [address] [phone].    Before we get started may I verify your date of birth? 1950    I am calling to officially welcome you to our practice and ask about your recent visit. Is this a good time to talk? no    Tell me about your visit with us. What things went well?  A My Chart message was sent to the patient.         We're always looking for ways to make our patients' experiences even better. Do you have recommendations on ways we may improve?  no    Overall were you satisfied with your first visit to our practice? yes       I appreciate you taking the time to speak with me today. Is there anything else I can do for you? no      Thank you, and have a great day.

## 2024-09-17 ENCOUNTER — TELEPHONE (OUTPATIENT)
Dept: NEUROSURGERY | Facility: CLINIC | Age: 74
End: 2024-09-17
Payer: MEDICARE

## 2024-09-23 ENCOUNTER — OFFICE VISIT (OUTPATIENT)
Dept: NEUROLOGY | Facility: CLINIC | Age: 74
End: 2024-09-23
Payer: MEDICARE

## 2024-09-23 ENCOUNTER — PATIENT MESSAGE (OUTPATIENT)
Dept: NEUROLOGY | Facility: CLINIC | Age: 74
End: 2024-09-23

## 2024-09-23 VITALS
DIASTOLIC BLOOD PRESSURE: 60 MMHG | HEART RATE: 60 BPM | BODY MASS INDEX: 22.86 KG/M2 | WEIGHT: 129 LBS | SYSTOLIC BLOOD PRESSURE: 118 MMHG | RESPIRATION RATE: 20 BRPM | HEIGHT: 63 IN

## 2024-09-23 DIAGNOSIS — R20.2 PARESTHESIA: ICD-10-CM

## 2024-09-23 DIAGNOSIS — I73.9 CLAUDICATION OF BOTH LOWER EXTREMITIES: ICD-10-CM

## 2024-09-23 DIAGNOSIS — R29.898 WEAKNESS OF BOTH LOWER EXTREMITIES: Primary | ICD-10-CM

## 2024-09-23 DIAGNOSIS — M79.2 NEUROPATHIC PAIN: ICD-10-CM

## 2024-09-23 PROCEDURE — 3074F SYST BP LT 130 MM HG: CPT

## 2024-09-23 PROCEDURE — 3078F DIAST BP <80 MM HG: CPT

## 2024-09-23 PROCEDURE — 1160F RVW MEDS BY RX/DR IN RCRD: CPT

## 2024-09-23 PROCEDURE — 99214 OFFICE O/P EST MOD 30 MIN: CPT

## 2024-09-23 PROCEDURE — 1159F MED LIST DOCD IN RCRD: CPT

## 2024-09-23 RX ORDER — CHLORAL HYDRATE 500 MG
2000 CAPSULE ORAL
COMMUNITY

## 2024-09-25 DIAGNOSIS — M79.2 NEUROPATHIC PAIN: Primary | ICD-10-CM

## 2024-09-25 RX ORDER — DULOXETIN HYDROCHLORIDE 30 MG/1
30 CAPSULE, DELAYED RELEASE ORAL DAILY
Qty: 30 CAPSULE | Refills: 0 | Status: SHIPPED | OUTPATIENT
Start: 2024-09-25 | End: 2024-10-25

## 2024-09-30 ENCOUNTER — TELEPHONE (OUTPATIENT)
Dept: NEUROLOGY | Facility: CLINIC | Age: 74
End: 2024-09-30
Payer: MEDICARE

## 2024-10-01 DIAGNOSIS — R20.2 PARESTHESIA: ICD-10-CM

## 2024-10-01 DIAGNOSIS — R29.898 WEAKNESS OF BOTH LOWER EXTREMITIES: Primary | ICD-10-CM

## 2024-10-01 NOTE — TELEPHONE ENCOUNTER
"Can a new EMG / NCV order be placed for \"hospital performed\" instead of clinic so it can be completed sooner. I will close current order placed.    Thanks!  "

## 2024-10-10 NOTE — PROGRESS NOTES
HEMATOLOGY ONCOLOGY OUTPATIENT FOLLOWUP       Patient name: Mariajose Tabor  : 1950  MRN: [id number]  Primary Care Physician: Lilia George MD  Referring Physician: No ref. provider found  Reason For Consult: Abnormal light chain quantification.     History of Present Illness:  Patient is a 74 y.o.     2024: For the first time referred by her neurologist who, in the process of investigating symptoms of neuropathy, obtain protein electrophoresis and light chain quantification in serum. She was found to have a small excess of lambda light chains; the protein electrophoresis had no monoclonal spike. She had no previous history of a monoclonal gammopathy. She reported a history of cervical myelopathy secondary to spinal stenosis. She also reported a year and a half of dysesthesia and allodynia of the lower extremities. She described weakness that could be made worse by prolonged periods of standing up. She described having to sit down to relieve this pain and having to have her feet on the ground to relieve it. She had not had any weight loss. She denied nocturnal diaphoresis or fevers. She had a history of chronic back pain but it had not been worse. Reportedly an electromyography was unremarkable. On exam alert and conversant. No distress and no jaundice. No palpable adenopathy. Lungs diminished bilaterally and heart regular. Abdomen without hepatomegaly or splenomegaly. No edema. Reviewed the laboratory exams and discussed with her. It would appear she has monoclonal gammopathy of undetermined significance. Will investigate further.     10/16/2024: Not much better.  Still having prominent symptoms that are consistent with neuropathy.  She has continued to have investigations through the neurologist office.  She is eating well.  Her weight is stable or has increased a little bit and she has not had any fevers.  Denies chest pains or cough.  No abdominal pain or diarrhea.  On  exam alert and conversant.  No distress.  No jaundice.  The lungs are clear bilaterally and the heart regular.  Abdomen soft.  No edema.  Laboratory exams reviewed and discussed with her.  No suggestion of multiple myeloma.  The bone survey is negative.  She was found to have an IgA monoclonal protein that corresponds to 0.6 g/dL.  The lambda light chains are increased at about the same degree as they were at the time of the previous measurement at 50.8 mg/L.  The ratio however is not abnormal.  Will investigate further.  She is to have a bone marrow biopsy and aspiration with Congo red staining for investigation of amyloid deposition.  She will see me with results.    Past Medical History:   Diagnosis Date    GERD (gastroesophageal reflux disease)     Headache, tension-type     Hypertension     Migraine     Muscle weakness (generalized) 09/21/2023    Pain 09/21/2023    in right leg    Pain in left leg 09/21/2023    Paresthesia of skin 09/21/2023    Peripheral neuropathy 3-2023     Past Surgical History:   Procedure Laterality Date    BASAL CELL CARCINOMA EXCISION  04/10/2024    CARPAL TUNNEL RELEASE      CATARACT EXTRACTION      COLONOSCOPY      DILATATION AND CURETTAGE      ENDOSCOPY N/A 07/07/2021    Procedure: ESOPHAGOGASTRODUODENOSCOPY with gastric biopsy x 2;  Surgeon: Ana Muñiz MD;  Location: [facility] ENDOSCOPY;  Service: Gastroenterology;  Laterality: N/A;  gastric nodule,  hiatal hernia    EXPLORATORY LAPAROTOMY N/A 03/02/2021    Procedure: LAPAROTOMY EXPLORATORY with RIGHT COLON RESECTION;  Surgeon: Pedro Wren DO;  Location: [facility] MAIN OR;  Service: General;  Laterality: N/A;    HERNIA REPAIR      THUMB ARTHROSCOPY Bilateral     thumb fusion       Current Outpatient Medications:     alendronate (FOSAMAX) 70 MG tablet, TAKE 1 TABLET BY MOUTH ONCE A WEEK IN THE MORNING AT LEAST 30 MINUTES BEFORE FIRST FOOD, BEVERAGE OR MEDICATION OF THE DAY, Disp: , Rfl:     aspirin 81 MG EC tablet, Take 1  tablet by mouth Daily., Disp: , Rfl:     benazepril (LOTENSIN) 20 MG tablet, Take 1 tablet by mouth Daily., Disp: , Rfl:     Biotin 10 MG capsule, Take  by mouth., Disp: , Rfl:     calcium carbonate (OS-LILIANA) 600 MG tablet, Take 1 tablet by mouth 2 (Two) Times a Day With Meals., Disp: , Rfl:     clobetasol (TEMOVATE) 0.05 % cream, Apply  topically to the appropriate area as directed Every Night. Scalp, Disp: , Rfl:     doxycycline (PERIOSTAT) 20 MG tablet, TAKE 1 TABLET BY MOUTH WITH BREAKFAST AND 1 ONCE DAILY WITH SUPPER, Disp: , Rfl:     DULoxetine (Cymbalta) 30 MG capsule, Take 1 capsule by mouth Daily for 30 days. For neuropathic pain, Disp: 30 capsule, Rfl: 0    gabapentin (NEURONTIN) 300 MG capsule, Take 1 capsule by mouth 3 (Three) Times a Day., Disp: , Rfl:     hydroCHLOROthiazide (HYDRODIURIL) 12.5 MG tablet, Take 1 tablet by mouth Daily., Disp: , Rfl:     meloxicam (MOBIC) 15 MG tablet, Take 1 tablet by mouth Daily., Disp: , Rfl:     Multiple Vitamins-Minerals (PRESERVISION AREDS 2 PO), Take 2 tablets by mouth Daily., Disp: , Rfl:     NON FORMULARY / PATIENT SUPPLIED MEDICATION, Insert 2 mL into the vagina 2 (Two) Times a Week. Estriol 0.5% cream-   Insert 2ml twice weekly on Tuesdays and Fridays, Disp: , Rfl:     Omega-3 Fatty Acids (fish oil) 1000 MG capsule capsule, Take 2 capsules by mouth Daily With Breakfast., Disp: , Rfl:     omeprazole (priLOSEC) 40 MG capsule, Take 1 capsule by mouth Daily. before a meal, Disp: , Rfl:     traZODone (DESYREL) 150 MG tablet, Take 2 tablets by mouth every night at bedtime., Disp: , Rfl:     Allergies   Allergen Reactions    Hydroxychloroquine Sulfate Other (See Comments)     Build up on cornea; affected night vision       Family History   Problem Relation Age of Onset    Heart disease Mother     Stomach cancer Father     Heart disease Brother      Cancer-related family history includes Stomach cancer in her father.    Social History     Tobacco Use    Smoking status:  Former     Current packs/day: 0.00     Average packs/day: 0.3 packs/day for 19.2 years (5.8 ttl pk-yrs)     Types: Cigarettes     Start date:      Quit date: 3/22/2011     Years since quittin.5     Passive exposure: Past    Smokeless tobacco: Never    Tobacco comments:     Social smoker, quit 11 years ago   Vaping Use    Vaping status: Never Used   Substance Use Topics    Alcohol use: Not Currently     Alcohol/week: 7.0 standard drinks of alcohol    Drug use: Never     Types: Marijuana     Social History     Social History Narrative    Not on file     ROS:   Review of Systems   Constitutional:  Negative for activity change, appetite change, chills, diaphoresis, fatigue, fever and unexpected weight change.   HENT:  Negative for congestion, dental problem, drooling, ear discharge, ear pain, facial swelling, hearing loss, mouth sores, nosebleeds, postnasal drip, rhinorrhea, sinus pressure, sinus pain, sneezing, sore throat, tinnitus, trouble swallowing and voice change.    Eyes:  Negative for photophobia, pain, discharge, redness, itching and visual disturbance.   Respiratory:  Negative for apnea, cough, choking, chest tightness, shortness of breath, wheezing and stridor.    Cardiovascular:  Negative for chest pain, palpitations and leg swelling.   Gastrointestinal:  Negative for abdominal distention, abdominal pain, anal bleeding, blood in stool, constipation, diarrhea, nausea, rectal pain and vomiting.   Endocrine: Negative for cold intolerance, heat intolerance, polydipsia and polyuria.   Genitourinary:  Negative for decreased urine volume, difficulty urinating, dysuria, flank pain, frequency, genital sores, hematuria and urgency.   Musculoskeletal:  Negative for arthralgias, back pain, gait problem, joint swelling, myalgias, neck pain and neck stiffness.   Skin:  Negative for color change, pallor and rash.   Neurological:  Positive for weakness and numbness. Negative for dizziness, tremors, seizures,  "syncope, facial asymmetry, speech difficulty, light-headedness and headaches.   Hematological:  Negative for adenopathy. Does not bruise/bleed easily.   Psychiatric/Behavioral:  Negative for agitation, behavioral problems, confusion, decreased concentration, hallucinations, self-injury, sleep disturbance and suicidal ideas. The patient is not nervous/anxious.      Objective:    Vital Signs:  Vitals:    10/16/24 1057   BP: 126/72   Pulse: 70   Resp: 14   Temp: 98.2 °F (36.8 °C)   SpO2: 99%   Weight: 59.6 kg (131 lb 6.4 oz)   Height: 160 cm (63\")   PainSc:   2   PainLoc: Leg     Body mass index is 23.28 kg/m².    ECOG  (1) Restricted in physically strenuous activity, ambulatory and able to do work of light nature    Physical Exam:   Physical Exam  Constitutional:       General: She is not in acute distress.     Appearance: She is not ill-appearing, toxic-appearing or diaphoretic.      Comments: Well built, slender and in no distress. No jaundice.    HENT:      Head: Normocephalic and atraumatic.      Right Ear: External ear normal.      Left Ear: External ear normal.      Nose: Nose normal.      Mouth/Throat:      Mouth: Mucous membranes are moist.      Pharynx: Oropharynx is clear. No oropharyngeal exudate or posterior oropharyngeal erythema.   Eyes:      General: No scleral icterus.        Right eye: No discharge.         Left eye: No discharge.      Conjunctiva/sclera: Conjunctivae normal.      Pupils: Pupils are equal, round, and reactive to light.   Cardiovascular:      Rate and Rhythm: Normal rate and regular rhythm.      Pulses: Normal pulses.      Heart sounds: No murmur heard.     No friction rub. No gallop.   Pulmonary:      Effort: No respiratory distress.      Breath sounds: No stridor. No wheezing, rhonchi or rales.   Abdominal:      General: Bowel sounds are normal. There is no distension.      Palpations: Abdomen is soft. There is no mass.      Tenderness: There is no abdominal tenderness. There is no " right CVA tenderness, left CVA tenderness, guarding or rebound.      Hernia: No hernia is present.   Musculoskeletal:         General: No tenderness, deformity or signs of injury.      Cervical back: No rigidity.      Right lower leg: No edema.      Left lower leg: No edema.   Lymphadenopathy:      Cervical: No cervical adenopathy.   Skin:     Coloration: Skin is not jaundiced or pale.      Findings: No bruising, lesion or rash.   Neurological:      General: No focal deficit present.      Mental Status: She is alert and oriented to person, place, and time.      Cranial Nerves: No cranial nerve deficit.   Psychiatric:         Mood and Affect: Mood normal.         Behavior: Behavior normal.         Thought Content: Thought content normal.         Judgment: Judgment normal.     CANDY Real MD performed the physical exam on 10/16/2024 as documented above.    Lab Results - Last 18 Months   Lab Units 10/16/24  1034 09/11/24  1102 12/04/23  1040   WBC 10*3/mm3 3.37* 4.27 4.79   HEMOGLOBIN g/dL 11.3* 11.7* 12.5   HEMATOCRIT % 35.3 36.7 38.4   PLATELETS 10*3/mm3 317 313 328   MCV fL 92.2 91.5 91.6     Lab Results - Last 18 Months   Lab Units 09/11/24  1221 12/04/23  1040   SODIUM mmol/L 134* 135*   POTASSIUM mmol/L 4.4 4.9   CHLORIDE mmol/L 95* 98   CO2 mmol/L 28.6 30.0*   BUN mg/dL 17 14   CREATININE mg/dL 0.79 0.86   CALCIUM mg/dL 9.3 9.3   BILIRUBIN mg/dL 0.2 0.3   ALK PHOS U/L 73 80   ALT (SGPT) U/L 6 10   AST (SGOT) U/L 24 15   GLUCOSE mg/dL 86 87     Lab Results   Component Value Date    GLUCOSE 86 09/11/2024    BUN 17 09/11/2024    CREATININE 0.79 09/11/2024    EGFRIFNONA 77 07/07/2021    BCR 21.5 09/11/2024    K 4.4 09/11/2024    CO2 28.6 09/11/2024    CALCIUM 9.3 09/11/2024    PROTENTOTREF 6.4 09/11/2024    ALBUMIN 4.5 09/11/2024    LABIL2 1.3 09/11/2024    AST 24 09/11/2024    ALT 6 09/11/2024     Lab Results   Component Value Date    FOLATE >20.00 05/30/2024     Lab Results   Component Value Date     OJHLYNJU24 >2,000 (H) 05/30/2024     Lab Results   Component Value Date    SPEP Comment 05/30/2024     LDH   Date Value Ref Range Status   05/30/2024 84 (L) 135 - 214 U/L Final     Lab Results   Component Value Date    TIO Positive (A) 12/04/2023    SEDRATE 7 05/30/2024     Lab Results   Component Value Date    SEDRATE 7 05/30/2024      Assessment & Plan     Monoclonal gammopathy: Indeed she has an IgA monoclonal protein with lambda light chain restriction.  No lytic bone lesions.  She is to have a bone marrow biopsy and aspiration.  She persists with mild anemia but renal function and calcium are unremarkable.  Neuropathy?  Cause remains unclear.  Anemia: Unchanged.  Reviewed all the laboratory exams and discussed the results with her.  See me again in approximately 3 weeks with results.    Elroy Real MD on 9/11/2024 at 12:57 PM.

## 2024-10-16 ENCOUNTER — LAB (OUTPATIENT)
Dept: LAB | Facility: HOSPITAL | Age: 74
End: 2024-10-16
Payer: MEDICARE

## 2024-10-16 ENCOUNTER — OFFICE VISIT (OUTPATIENT)
Dept: ONCOLOGY | Facility: CLINIC | Age: 74
End: 2024-10-16
Payer: MEDICARE

## 2024-10-16 VITALS
OXYGEN SATURATION: 99 % | HEART RATE: 70 BPM | DIASTOLIC BLOOD PRESSURE: 72 MMHG | HEIGHT: 63 IN | BODY MASS INDEX: 23.28 KG/M2 | WEIGHT: 131.4 LBS | TEMPERATURE: 98.2 F | SYSTOLIC BLOOD PRESSURE: 126 MMHG | RESPIRATION RATE: 14 BRPM

## 2024-10-16 DIAGNOSIS — M48.02 CERVICAL STENOSIS OF SPINE: ICD-10-CM

## 2024-10-16 DIAGNOSIS — D47.2 MGUS (MONOCLONAL GAMMOPATHY OF UNKNOWN SIGNIFICANCE): Primary | ICD-10-CM

## 2024-10-16 LAB
BASOPHILS # BLD AUTO: 0.02 10*3/MM3 (ref 0–0.2)
BASOPHILS NFR BLD AUTO: 0.6 % (ref 0–1.5)
DEPRECATED RDW RBC AUTO: 43.8 FL (ref 37–54)
EOSINOPHIL # BLD AUTO: 0.04 10*3/MM3 (ref 0–0.4)
EOSINOPHIL NFR BLD AUTO: 1.2 % (ref 0.3–6.2)
ERYTHROCYTE [DISTWIDTH] IN BLOOD BY AUTOMATED COUNT: 13.4 % (ref 12.3–15.4)
HCT VFR BLD AUTO: 35.3 % (ref 34–46.6)
HGB BLD-MCNC: 11.3 G/DL (ref 12–15.9)
HOLD SPECIMEN: NORMAL
LYMPHOCYTES # BLD AUTO: 0.78 10*3/MM3 (ref 0.7–3.1)
LYMPHOCYTES NFR BLD AUTO: 23.1 % (ref 19.6–45.3)
MCH RBC QN AUTO: 29.5 PG (ref 26.6–33)
MCHC RBC AUTO-ENTMCNC: 32 G/DL (ref 31.5–35.7)
MCV RBC AUTO: 92.2 FL (ref 79–97)
MONOCYTES # BLD AUTO: 0.43 10*3/MM3 (ref 0.1–0.9)
MONOCYTES NFR BLD AUTO: 12.8 % (ref 5–12)
NEUTROPHILS NFR BLD AUTO: 2.1 10*3/MM3 (ref 1.7–7)
NEUTROPHILS NFR BLD AUTO: 62.3 % (ref 42.7–76)
PLATELET # BLD AUTO: 317 10*3/MM3 (ref 140–450)
PMV BLD AUTO: 8.6 FL (ref 6–12)
RBC # BLD AUTO: 3.83 10*6/MM3 (ref 3.77–5.28)
WBC NRBC COR # BLD AUTO: 3.37 10*3/MM3 (ref 3.4–10.8)

## 2024-10-16 PROCEDURE — 99214 OFFICE O/P EST MOD 30 MIN: CPT | Performed by: INTERNAL MEDICINE

## 2024-10-16 PROCEDURE — 36415 COLL VENOUS BLD VENIPUNCTURE: CPT

## 2024-10-16 PROCEDURE — 1125F AMNT PAIN NOTED PAIN PRSNT: CPT | Performed by: INTERNAL MEDICINE

## 2024-10-16 PROCEDURE — 1159F MED LIST DOCD IN RCRD: CPT | Performed by: INTERNAL MEDICINE

## 2024-10-16 PROCEDURE — 85025 COMPLETE CBC W/AUTO DIFF WBC: CPT

## 2024-10-16 PROCEDURE — 3074F SYST BP LT 130 MM HG: CPT | Performed by: INTERNAL MEDICINE

## 2024-10-16 PROCEDURE — 1160F RVW MEDS BY RX/DR IN RCRD: CPT | Performed by: INTERNAL MEDICINE

## 2024-10-16 PROCEDURE — 3078F DIAST BP <80 MM HG: CPT | Performed by: INTERNAL MEDICINE

## 2024-10-20 LAB — VIT B1 BLD-SCNC: 120 NMOL/L (ref 66.5–200)

## 2024-10-21 ENCOUNTER — PATIENT MESSAGE (OUTPATIENT)
Dept: NEUROLOGY | Facility: CLINIC | Age: 74
End: 2024-10-21
Payer: MEDICARE

## 2024-10-21 DIAGNOSIS — M79.2 NEUROPATHIC PAIN: ICD-10-CM

## 2024-10-22 DIAGNOSIS — M79.2 NEUROPATHIC PAIN: ICD-10-CM

## 2024-10-22 RX ORDER — DULOXETIN HYDROCHLORIDE 30 MG/1
CAPSULE, DELAYED RELEASE ORAL
Qty: 30 CAPSULE | Refills: 0 | OUTPATIENT
Start: 2024-10-22

## 2024-10-22 RX ORDER — DULOXETIN HYDROCHLORIDE 60 MG/1
60 CAPSULE, DELAYED RELEASE ORAL DAILY
Qty: 30 CAPSULE | Refills: 3 | Status: SHIPPED | OUTPATIENT
Start: 2024-10-22

## 2024-10-22 RX ORDER — DULOXETIN HYDROCHLORIDE 30 MG/1
60 CAPSULE, DELAYED RELEASE ORAL DAILY
Qty: 60 CAPSULE | Refills: 0 | Status: SHIPPED | OUTPATIENT
Start: 2024-10-22 | End: 2024-10-22

## 2024-10-25 ENCOUNTER — HOSPITAL ENCOUNTER (OUTPATIENT)
Dept: CT IMAGING | Facility: HOSPITAL | Age: 74
Discharge: HOME | End: 2024-10-25
Payer: MEDICARE

## 2024-10-25 VITALS
SYSTOLIC BLOOD PRESSURE: 130 MMHG | HEIGHT: 63 IN | OXYGEN SATURATION: 92 % | WEIGHT: 131 LBS | DIASTOLIC BLOOD PRESSURE: 54 MMHG | HEART RATE: 59 BPM | RESPIRATION RATE: 12 BRPM | TEMPERATURE: 98.4 F | BODY MASS INDEX: 23.21 KG/M2

## 2024-10-25 DIAGNOSIS — D47.2 MGUS (MONOCLONAL GAMMOPATHY OF UNKNOWN SIGNIFICANCE): ICD-10-CM

## 2024-10-25 LAB
APTT PPP: 26.9 SECONDS (ref 24–31)
BASOPHILS # BLD AUTO: 0.05 10*3/MM3 (ref 0–0.2)
BASOPHILS NFR BLD AUTO: 1.4 % (ref 0–1.5)
DEPRECATED RDW RBC AUTO: 44.9 FL (ref 37–54)
EOSINOPHIL # BLD AUTO: 0.07 10*3/MM3 (ref 0–0.4)
EOSINOPHIL NFR BLD AUTO: 2 % (ref 0.3–6.2)
ERYTHROCYTE [DISTWIDTH] IN BLOOD BY AUTOMATED COUNT: 13.3 % (ref 12.3–15.4)
HCT VFR BLD AUTO: 38.2 % (ref 34–46.6)
HGB BLD-MCNC: 12.1 G/DL (ref 12–15.9)
IMM GRANULOCYTES # BLD AUTO: 0 10*3/MM3 (ref 0–0.05)
IMM GRANULOCYTES NFR BLD AUTO: 0 % (ref 0–0.5)
INR PPP: 0.95 (ref 0.93–1.1)
LYMPHOCYTES # BLD AUTO: 0.76 10*3/MM3 (ref 0.7–3.1)
LYMPHOCYTES NFR BLD AUTO: 21.6 % (ref 19.6–45.3)
MCH RBC QN AUTO: 28.8 PG (ref 26.6–33)
MCHC RBC AUTO-ENTMCNC: 31.7 G/DL (ref 31.5–35.7)
MCV RBC AUTO: 91 FL (ref 79–97)
MONOCYTES # BLD AUTO: 0.38 10*3/MM3 (ref 0.1–0.9)
MONOCYTES NFR BLD AUTO: 10.8 % (ref 5–12)
NEUTROPHILS NFR BLD AUTO: 2.26 10*3/MM3 (ref 1.7–7)
NEUTROPHILS NFR BLD AUTO: 64.2 % (ref 42.7–76)
NRBC BLD AUTO-RTO: 0 /100 WBC (ref 0–0.2)
PLATELET # BLD AUTO: 341 10*3/MM3 (ref 140–450)
PMV BLD AUTO: 8.6 FL (ref 6–12)
PROTHROMBIN TIME: 10.4 SECONDS (ref 9.6–11.7)
RBC # BLD AUTO: 4.2 10*6/MM3 (ref 3.77–5.28)
WBC NRBC COR # BLD AUTO: 3.52 10*3/MM3 (ref 3.4–10.8)

## 2024-10-25 PROCEDURE — 85730 THROMBOPLASTIN TIME PARTIAL: CPT | Performed by: RADIOLOGY

## 2024-10-25 PROCEDURE — 85025 COMPLETE CBC W/AUTO DIFF WBC: CPT | Performed by: RADIOLOGY

## 2024-10-25 PROCEDURE — 25010000002 LIDOCAINE 1 % SOLUTION

## 2024-10-25 PROCEDURE — C1830 POWER BONE MARROW BX NEEDLE: HCPCS

## 2024-10-25 PROCEDURE — 25010000002 MIDAZOLAM PER 1 MG

## 2024-10-25 PROCEDURE — 99152 MOD SED SAME PHYS/QHP 5/>YRS: CPT

## 2024-10-25 PROCEDURE — 85610 PROTHROMBIN TIME: CPT | Performed by: RADIOLOGY

## 2024-10-25 PROCEDURE — 25010000002 ONDANSETRON PER 1 MG

## 2024-10-25 PROCEDURE — 77012 CT SCAN FOR NEEDLE BIOPSY: CPT

## 2024-10-25 PROCEDURE — 25010000002 FENTANYL CITRATE (PF) 50 MCG/ML SOLUTION

## 2024-10-25 RX ORDER — SODIUM CHLORIDE 0.9 % (FLUSH) 0.9 %
10 SYRINGE (ML) INJECTION AS NEEDED
Status: DISCONTINUED | OUTPATIENT
Start: 2024-10-25 | End: 2024-10-26 | Stop reason: HOSPADM

## 2024-10-25 RX ORDER — MIDAZOLAM HYDROCHLORIDE 1 MG/ML
INJECTION INTRAMUSCULAR; INTRAVENOUS AS NEEDED
Status: COMPLETED | OUTPATIENT
Start: 2024-10-25 | End: 2024-10-25

## 2024-10-25 RX ORDER — ONDANSETRON 2 MG/ML
INJECTION INTRAMUSCULAR; INTRAVENOUS AS NEEDED
Status: COMPLETED | OUTPATIENT
Start: 2024-10-25 | End: 2024-10-25

## 2024-10-25 RX ORDER — FENTANYL CITRATE 50 UG/ML
INJECTION, SOLUTION INTRAMUSCULAR; INTRAVENOUS AS NEEDED
Status: COMPLETED | OUTPATIENT
Start: 2024-10-25 | End: 2024-10-25

## 2024-10-25 RX ORDER — SODIUM CHLORIDE 0.9 % (FLUSH) 0.9 %
10 SYRINGE (ML) INJECTION EVERY 12 HOURS SCHEDULED
Status: DISCONTINUED | OUTPATIENT
Start: 2024-10-25 | End: 2024-10-26 | Stop reason: HOSPADM

## 2024-10-25 RX ORDER — LIDOCAINE HYDROCHLORIDE 10 MG/ML
INJECTION, SOLUTION INFILTRATION; PERINEURAL AS NEEDED
Status: COMPLETED | OUTPATIENT
Start: 2024-10-25 | End: 2024-10-25

## 2024-10-25 RX ADMIN — MIDAZOLAM 1 MG: 1 INJECTION INTRAMUSCULAR; INTRAVENOUS at 09:10

## 2024-10-25 RX ADMIN — ONDANSETRON 4 MG: 2 INJECTION INTRAMUSCULAR; INTRAVENOUS at 09:06

## 2024-10-25 RX ADMIN — LIDOCAINE HYDROCHLORIDE 10 ML: 10 INJECTION, SOLUTION INFILTRATION; PERINEURAL at 09:21

## 2024-10-25 RX ADMIN — FENTANYL CITRATE 100 MCG: 50 INJECTION, SOLUTION INTRAMUSCULAR; INTRAVENOUS at 09:10

## 2024-10-25 NOTE — H&P
[facility]   Interventional Radiology H&P    Patient Name: Mariajose Tabor  : 1950  MRN: [id number]  Primary Care Physician:  Lilia George MD  Referring Physician: Elroy Real MD  Date of admission: 10/25/2024    Subjective   Subjective     HPI:  Mariajose Tabor is a 74 y.o. female with monoclonal gammopathy of unknown significance.     Review of Systems:   Constitutional no fever,  no weight loss       Otolaryngeal no difficulty swallowing   Cardiovascular no chest pain   Pulmonary no cough, no sputum production   Gastrointestinal no constipation, no diarrhea                         Personal History       Past Medical/Surgical History:   Past Medical History:   Diagnosis Date    GERD (gastroesophageal reflux disease)     Headache, tension-type     Hypertension     Migraine     Muscle weakness (generalized) 2023    Pain 2023    in right leg    Pain in left leg 2023    Paresthesia of skin 2023    Peripheral neuropathy 3-     Past Surgical History:   Procedure Laterality Date    BASAL CELL CARCINOMA EXCISION  04/10/2024    CARPAL TUNNEL RELEASE      CATARACT EXTRACTION      COLON RESECTION      COLONOSCOPY      DILATATION AND CURETTAGE      ENDOSCOPY N/A 2021    Procedure: ESOPHAGOGASTRODUODENOSCOPY with gastric biopsy x 2;  Surgeon: Ana Muiñz MD;  Location: [facility] ENDOSCOPY;  Service: Gastroenterology;  Laterality: N/A;  gastric nodule,  hiatal hernia    EXPLORATORY LAPAROTOMY N/A 2021    Procedure: LAPAROTOMY EXPLORATORY with RIGHT COLON RESECTION;  Surgeon: Pedro Wren DO;  Location: [facility] MAIN OR;  Service: General;  Laterality: N/A;    HERNIA REPAIR      THUMB ARTHROSCOPY Bilateral     thumb fusion       Social History:  reports that she quit smoking about 13 years ago. Her smoking use included cigarettes. She started smoking about 32 years ago. She has a 5.8 pack-year smoking history. She has been exposed to tobacco smoke. She  "has never used smokeless tobacco. She reports that she does not currently use alcohol after a past usage of about 7.0 standard drinks of alcohol per week. She reports that she does not use drugs.    Medications:  (Not in a hospital admission)    Current medications:  sodium chloride, 10 mL, Intravenous, Q12H      Current IV drips:       Allergies:  Allergies   Allergen Reactions    Hydroxychloroquine Sulfate Other (See Comments)     Build up on cornea; affected night vision       Objective    Objective     Vitals:   Temp:  [98.4 °F (36.9 °C)] 98.4 °F (36.9 °C)  Heart Rate:  [58] 58  Resp:  [8] 8  BP: (118)/(54) 118/54      Physical Exam:   Constitutional: Awake, alert, No acute distress    Respiratory: Clear to auscultation bilaterally, nonlabored respirations    Cardiovascular: RRR, no murmurs, rubs, or gallops, palpable pedal pulses bilaterally   Gastrointestinal: Positive bowel sounds, soft, nontender, nondistended        ASA SCALE ASSESSMENT:  2-Mild to moderate systemic disease, medically well controlled, with no functional limitation    MALLAMPATI CLASSIFICATION:  2-Able to visualize the soft palate, fauces, uvula. The anterior & posterior tonsilar pillars are hidden by the tongue.       Result Review        Result Review:     No results found for: \"NA\"    No results found for: \"K\"    No results found for: \"CL\"    No results found for: \"PLASMABICARB\"    No results found for: \"BUN\"    No results found for: \"CREATININE\"    No results found for: \"CALCIUM\"        No components found for: \"GLUCOSE.*\"  Results from last 7 days   Lab Units 10/25/24  0748   WBC 10*3/mm3 3.52   HEMOGLOBIN g/dL 12.1   HEMATOCRIT % 38.2   PLATELETS 10*3/mm3 341      Results from last 7 days   Lab Units 10/25/24  0748   INR  0.95           Assessment / Plan     Assesment:   Monoclonal gammopathy of unknown significance.      Plan:   CT guided bone marrow aspiration and biopsy.     The risks and benefits of the procedure were discussed " with the patient.    Electronically signed by YAS Fulton, 10/25/24, 9:03 AM EDT.

## 2024-10-26 LAB — Lab: NORMAL

## 2024-10-31 LAB — REF LAB TEST RESULTS: NORMAL

## 2024-11-01 LAB
CYTO UR: NORMAL
LAB AP CASE REPORT: NORMAL
LAB AP DIAGNOSIS COMMENT: NORMAL
LAB AP FLOW CYTOMETRY SUMMARY: NORMAL
LAB AP SPECIAL STAINS: NORMAL
PATH REPORT.FINAL DX SPEC: NORMAL
PATH REPORT.GROSS SPEC: NORMAL

## 2024-11-04 LAB — CYTOGENETICS RESULT: NORMAL

## 2024-11-05 LAB — PLASMA CELL MYELOMA TARGETGENE PANEL RESULT: NORMAL

## 2024-11-06 LAB
CYTO UR: NORMAL
LAB AP CASE REPORT: NORMAL
LAB AP CYTOGENETICS REPORT,ADDENDUM: NORMAL
LAB AP DIAGNOSIS COMMENT: NORMAL
LAB AP FLOW CYTOMETRY SUMMARY: NORMAL
LAB AP SPECIAL STAINS: NORMAL
PATH REPORT.FINAL DX SPEC: NORMAL
PATH REPORT.GROSS SPEC: NORMAL

## 2024-11-07 ENCOUNTER — OFFICE VISIT (OUTPATIENT)
Dept: ONCOLOGY | Facility: CLINIC | Age: 74
End: 2024-11-07
Payer: MEDICARE

## 2024-11-07 ENCOUNTER — LAB (OUTPATIENT)
Dept: LAB | Facility: HOSPITAL | Age: 74
End: 2024-11-07
Payer: MEDICARE

## 2024-11-07 VITALS
HEIGHT: 63 IN | OXYGEN SATURATION: 100 % | HEART RATE: 62 BPM | DIASTOLIC BLOOD PRESSURE: 61 MMHG | SYSTOLIC BLOOD PRESSURE: 127 MMHG | WEIGHT: 130.4 LBS | BODY MASS INDEX: 23.11 KG/M2

## 2024-11-07 DIAGNOSIS — D47.2 MGUS (MONOCLONAL GAMMOPATHY OF UNKNOWN SIGNIFICANCE): Primary | ICD-10-CM

## 2024-11-07 LAB
BASOPHILS # BLD AUTO: 0.03 10*3/MM3 (ref 0–0.2)
BASOPHILS NFR BLD AUTO: 0.6 % (ref 0–1.5)
DEPRECATED RDW RBC AUTO: 43.3 FL (ref 37–54)
EOSINOPHIL # BLD AUTO: 0.09 10*3/MM3 (ref 0–0.4)
EOSINOPHIL NFR BLD AUTO: 1.8 % (ref 0.3–6.2)
ERYTHROCYTE [DISTWIDTH] IN BLOOD BY AUTOMATED COUNT: 13.4 % (ref 12.3–15.4)
HCT VFR BLD AUTO: 33.8 % (ref 34–46.6)
HGB BLD-MCNC: 10.9 G/DL (ref 12–15.9)
HOLD SPECIMEN: NORMAL
LYMPHOCYTES # BLD AUTO: 0.84 10*3/MM3 (ref 0.7–3.1)
LYMPHOCYTES NFR BLD AUTO: 17 % (ref 19.6–45.3)
MCH RBC QN AUTO: 29.5 PG (ref 26.6–33)
MCHC RBC AUTO-ENTMCNC: 32.2 G/DL (ref 31.5–35.7)
MCV RBC AUTO: 91.4 FL (ref 79–97)
MONOCYTES # BLD AUTO: 0.53 10*3/MM3 (ref 0.1–0.9)
MONOCYTES NFR BLD AUTO: 10.8 % (ref 5–12)
NEUTROPHILS NFR BLD AUTO: 3.44 10*3/MM3 (ref 1.7–7)
NEUTROPHILS NFR BLD AUTO: 69.8 % (ref 42.7–76)
PLATELET # BLD AUTO: 270 10*3/MM3 (ref 140–450)
PMV BLD AUTO: 7.9 FL (ref 6–12)
RBC # BLD AUTO: 3.7 10*6/MM3 (ref 3.77–5.28)
WBC NRBC COR # BLD AUTO: 4.93 10*3/MM3 (ref 3.4–10.8)

## 2024-11-07 PROCEDURE — 36415 COLL VENOUS BLD VENIPUNCTURE: CPT

## 2024-11-07 PROCEDURE — 3078F DIAST BP <80 MM HG: CPT | Performed by: INTERNAL MEDICINE

## 2024-11-07 PROCEDURE — 99214 OFFICE O/P EST MOD 30 MIN: CPT | Performed by: INTERNAL MEDICINE

## 2024-11-07 PROCEDURE — 1160F RVW MEDS BY RX/DR IN RCRD: CPT | Performed by: INTERNAL MEDICINE

## 2024-11-07 PROCEDURE — 1126F AMNT PAIN NOTED NONE PRSNT: CPT | Performed by: INTERNAL MEDICINE

## 2024-11-07 PROCEDURE — 85025 COMPLETE CBC W/AUTO DIFF WBC: CPT | Performed by: INTERNAL MEDICINE

## 2024-11-07 PROCEDURE — 3074F SYST BP LT 130 MM HG: CPT | Performed by: INTERNAL MEDICINE

## 2024-11-07 PROCEDURE — 1159F MED LIST DOCD IN RCRD: CPT | Performed by: INTERNAL MEDICINE

## 2024-11-08 LAB — CCV RESULT: NORMAL

## 2024-11-19 DIAGNOSIS — R29.898 WEAKNESS OF BOTH LOWER EXTREMITIES: ICD-10-CM

## 2024-11-19 DIAGNOSIS — M79.2 NEUROPATHIC PAIN: Primary | ICD-10-CM

## 2024-11-19 DIAGNOSIS — R20.2 PARESTHESIA: ICD-10-CM

## 2024-11-22 ENCOUNTER — HOSPITAL ENCOUNTER (OUTPATIENT)
Dept: NEUROLOGY | Facility: HOSPITAL | Age: 74
Discharge: HOME | End: 2024-11-22
Payer: MEDICARE

## 2024-11-22 ENCOUNTER — TELEPHONE (OUTPATIENT)
Dept: NEUROSURGERY | Facility: CLINIC | Age: 74
End: 2024-11-22
Payer: MEDICARE

## 2024-11-22 PROCEDURE — 95908 NRV CNDJ TST 3-4 STUDIES: CPT

## 2024-11-22 PROCEDURE — 95886 MUSC TEST DONE W/N TEST COMP: CPT

## 2024-11-22 NOTE — TELEPHONE ENCOUNTER
Caller: HEAVEN    Relationship to patient: SELF    Best call back number: [phone]    Type of visit: SURGERY       Additional notes:THE PATIENT WOULD LIKE TO SCHEDULE HER SX W/ DR RINCON IN JANUARY 2025    HER INSURANCE MAY REQUIRE ANOTHER PRE-AUTH

## 2024-11-26 ENCOUNTER — PREP FOR SURGERY (OUTPATIENT)
Dept: OTHER | Facility: HOSPITAL | Age: 74
End: 2024-11-26
Payer: MEDICARE

## 2024-11-26 DIAGNOSIS — R79.1 ABNORMAL COAGULATION PROFILE: ICD-10-CM

## 2024-11-26 DIAGNOSIS — M48.02 CERVICAL STENOSIS OF SPINE: Primary | ICD-10-CM

## 2024-11-26 DIAGNOSIS — R79.9 ABNORMAL FINDING OF BLOOD CHEMISTRY, UNSPECIFIED: ICD-10-CM

## 2024-11-26 NOTE — TELEPHONE ENCOUNTER
Patient called and asked to speak to Natali. I did tell the patient that Natali will call her back. She verbally understood.

## 2024-12-06 ENCOUNTER — TELEPHONE (OUTPATIENT)
Dept: NEUROSURGERY | Facility: CLINIC | Age: 74
End: 2024-12-06
Payer: MEDICARE

## 2024-12-06 NOTE — TELEPHONE ENCOUNTER
CALLED PATIENT TO LET HER KNOW THAT I HAD TO MOVE SURGERY WITH SERGEY TO 1/23/25 AS DR RINCON WILL BE ON VACATION THE WEEK OF 1/27/25. PATIENT VERBALLY UNDERSTOOD

## 2025-01-09 DIAGNOSIS — Z98.1 S/P SPINAL FUSION: Primary | ICD-10-CM

## 2025-01-14 ENCOUNTER — HOSPITAL ENCOUNTER (OUTPATIENT)
Dept: CARDIOLOGY | Facility: HOSPITAL | Age: 75
Discharge: HOME | End: 2025-01-14
Payer: MEDICARE

## 2025-01-14 ENCOUNTER — LAB (OUTPATIENT)
Dept: LAB | Facility: HOSPITAL | Age: 75
End: 2025-01-14
Payer: MEDICARE

## 2025-01-14 DIAGNOSIS — M48.02 CERVICAL STENOSIS OF SPINE: ICD-10-CM

## 2025-01-14 DIAGNOSIS — R79.1 ABNORMAL COAGULATION PROFILE: ICD-10-CM

## 2025-01-14 DIAGNOSIS — R79.9 ABNORMAL FINDING OF BLOOD CHEMISTRY, UNSPECIFIED: ICD-10-CM

## 2025-01-14 LAB
ABO GROUP BLD: NORMAL
ANION GAP SERPL CALCULATED.3IONS-SCNC: 7 MMOL/L (ref 5–15)
BASOPHILS # BLD AUTO: 0.04 10*3/MM3 (ref 0–0.2)
BASOPHILS NFR BLD AUTO: 1.5 % (ref 0–1.5)
BILIRUB UR QL STRIP: NEGATIVE
BLD GP AB SCN SERPL QL: NEGATIVE
BUN SERPL-MCNC: 13 MG/DL (ref 8–23)
BUN/CREAT SERPL: 16.7 (ref 7–25)
CALCIUM SPEC-SCNC: 8.7 MG/DL (ref 8.6–10.5)
CHLORIDE SERPL-SCNC: 96 MMOL/L (ref 98–107)
CLARITY UR: CLEAR
CO2 SERPL-SCNC: 30 MMOL/L (ref 22–29)
COLOR UR: YELLOW
CREAT SERPL-MCNC: 0.78 MG/DL (ref 0.57–1)
DEPRECATED RDW RBC AUTO: 43.2 FL (ref 37–54)
EGFRCR SERPLBLD CKD-EPI 2021: 80.8 ML/MIN/1.73
EOSINOPHIL # BLD AUTO: 0.06 10*3/MM3 (ref 0–0.4)
EOSINOPHIL NFR BLD AUTO: 2.2 % (ref 0.3–6.2)
ERYTHROCYTE [DISTWIDTH] IN BLOOD BY AUTOMATED COUNT: 13.1 % (ref 12.3–15.4)
GLUCOSE SERPL-MCNC: 67 MG/DL (ref 65–99)
GLUCOSE UR STRIP-MCNC: NEGATIVE MG/DL
HBA1C MFR BLD: 5.5 % (ref 4.8–5.6)
HCT VFR BLD AUTO: 33.8 % (ref 34–46.6)
HGB BLD-MCNC: 10.5 G/DL (ref 12–15.9)
HGB UR QL STRIP.AUTO: NEGATIVE
IMM GRANULOCYTES # BLD AUTO: 0.01 10*3/MM3 (ref 0–0.05)
IMM GRANULOCYTES NFR BLD AUTO: 0.4 % (ref 0–0.5)
INR PPP: 0.98 (ref 0.9–1.1)
KETONES UR QL STRIP: NEGATIVE
LEUKOCYTE ESTERASE UR QL STRIP.AUTO: ABNORMAL
LYMPHOCYTES # BLD AUTO: 0.82 10*3/MM3 (ref 0.7–3.1)
LYMPHOCYTES NFR BLD AUTO: 30.3 % (ref 19.6–45.3)
MCH RBC QN AUTO: 27.9 PG (ref 26.6–33)
MCHC RBC AUTO-ENTMCNC: 31.1 G/DL (ref 31.5–35.7)
MCV RBC AUTO: 89.7 FL (ref 79–97)
MONOCYTES # BLD AUTO: 0.35 10*3/MM3 (ref 0.1–0.9)
MONOCYTES NFR BLD AUTO: 12.9 % (ref 5–12)
MRSA DNA SPEC QL NAA+PROBE: NORMAL
NEUTROPHILS NFR BLD AUTO: 1.43 10*3/MM3 (ref 1.7–7)
NEUTROPHILS NFR BLD AUTO: 52.7 % (ref 42.7–76)
NITRITE UR QL STRIP: NEGATIVE
NRBC BLD AUTO-RTO: 0 /100 WBC (ref 0–0.2)
PH UR STRIP.AUTO: 8 [PH] (ref 5–8)
PLATELET # BLD AUTO: 329 10*3/MM3 (ref 140–450)
PMV BLD AUTO: 9.4 FL (ref 6–12)
POTASSIUM SERPL-SCNC: 4.9 MMOL/L (ref 3.5–5.2)
PROT UR QL STRIP: NEGATIVE
PROTHROMBIN TIME: 13.1 SECONDS (ref 11.7–14.2)
QT INTERVAL: 399 MS
QTC INTERVAL: 405 MS
RBC # BLD AUTO: 3.77 10*6/MM3 (ref 3.77–5.28)
RH BLD: POSITIVE
SODIUM SERPL-SCNC: 133 MMOL/L (ref 136–145)
SP GR UR STRIP: 1.01 (ref 1–1.03)
T&S EXPIRATION DATE: NORMAL
UROBILINOGEN UR QL STRIP: ABNORMAL
WBC NRBC COR # BLD AUTO: 2.71 10*3/MM3 (ref 3.4–10.8)

## 2025-01-14 PROCEDURE — 85025 COMPLETE CBC W/AUTO DIFF WBC: CPT

## 2025-01-14 PROCEDURE — 36415 COLL VENOUS BLD VENIPUNCTURE: CPT

## 2025-01-14 PROCEDURE — 86901 BLOOD TYPING SEROLOGIC RH(D): CPT | Performed by: NEUROLOGICAL SURGERY

## 2025-01-14 PROCEDURE — 86900 BLOOD TYPING SEROLOGIC ABO: CPT | Performed by: NEUROLOGICAL SURGERY

## 2025-01-14 PROCEDURE — 85610 PROTHROMBIN TIME: CPT

## 2025-01-14 PROCEDURE — 87641 MR-STAPH DNA AMP PROBE: CPT

## 2025-01-14 PROCEDURE — 80048 BASIC METABOLIC PNL TOTAL CA: CPT

## 2025-01-14 PROCEDURE — 93005 ELECTROCARDIOGRAM TRACING: CPT | Performed by: NEUROLOGICAL SURGERY

## 2025-01-14 PROCEDURE — 81003 URINALYSIS AUTO W/O SCOPE: CPT

## 2025-01-14 PROCEDURE — 83036 HEMOGLOBIN GLYCOSYLATED A1C: CPT

## 2025-01-14 PROCEDURE — 86850 RBC ANTIBODY SCREEN: CPT | Performed by: NEUROLOGICAL SURGERY

## 2025-01-22 ENCOUNTER — ANESTHESIA EVENT (OUTPATIENT)
Dept: PERIOP | Facility: HOSPITAL | Age: 75
End: 2025-01-22
Payer: MEDICARE

## 2025-01-22 NOTE — ANESTHESIA PREPROCEDURE EVALUATION
Anesthesia Evaluation     Patient summary reviewed and Nursing notes reviewed   NPO Solid Status: > 8 hours  NPO Liquid Status: > 8 hours           Airway   Mallampati: II  TM distance: >3 FB  Neck ROM: full  No difficulty expected  Dental - normal exam     Pulmonary - normal exam   Cardiovascular - normal exam    ECG reviewed    (+) hypertension      Neuro/Psych  (+) headaches, weakness, numbness  GI/Hepatic/Renal/Endo    (+) GERD    Musculoskeletal     Abdominal  - normal exam    Bowel sounds: normal.   Substance History      OB/GYN          Other   arthritis,     ROS/Med Hx Other: Sinus rhythm  Anteroseptal  infarct, age indeterminate                      Anesthesia Plan    ASA 3     general and ERAS Protocol   total IV anesthesia  intravenous induction     Anesthetic plan, risks, benefits, and alternatives have been provided, discussed and informed consent has been obtained with: patient.    Plan discussed with CRNA.        CODE STATUS:

## 2025-01-23 ENCOUNTER — APPOINTMENT (OUTPATIENT)
Dept: GENERAL RADIOLOGY | Facility: HOSPITAL | Age: 75
End: 2025-01-23
Payer: MEDICARE

## 2025-01-23 ENCOUNTER — ANESTHESIA (OUTPATIENT)
Dept: PERIOP | Facility: HOSPITAL | Age: 75
End: 2025-01-23
Payer: MEDICARE

## 2025-01-23 ENCOUNTER — HOSPITAL ENCOUNTER (INPATIENT)
Facility: HOSPITAL | Age: 75
LOS: 1 days | Discharge: HOME | End: 2025-01-24
Attending: NEUROLOGICAL SURGERY | Admitting: NEUROLOGICAL SURGERY
Payer: MEDICARE

## 2025-01-23 DIAGNOSIS — M48.02 CERVICAL STENOSIS OF SPINE: ICD-10-CM

## 2025-01-23 DIAGNOSIS — Z98.1 S/P CERVICAL SPINAL FUSION: Primary | ICD-10-CM

## 2025-01-23 PROCEDURE — 0RG20A0 FUSION OF 2 OR MORE CERVICAL VERTEBRAL JOINTS WITH INTERBODY FUSION DEVICE, ANTERIOR APPROACH, ANTERIOR COLUMN, OPEN APPROACH: ICD-10-PCS | Performed by: NEUROLOGICAL SURGERY

## 2025-01-23 PROCEDURE — 25010000002 LIDOCAINE 1% - EPINEPHRINE 1:100000 1 %-1:100000 SOLUTION: Performed by: NEUROLOGICAL SURGERY

## 2025-01-23 PROCEDURE — 25010000002 HYDROMORPHONE 1 MG/ML SOLUTION: Performed by: NURSE ANESTHETIST, CERTIFIED REGISTERED

## 2025-01-23 PROCEDURE — 25010000002 PROPOFOL 1000 MG/100ML EMULSION: Performed by: NURSE ANESTHETIST, CERTIFIED REGISTERED

## 2025-01-23 PROCEDURE — 25010000002 CEFAZOLIN PER 500 MG: Performed by: NEUROLOGICAL SURGERY

## 2025-01-23 PROCEDURE — 25810000003 SODIUM CHLORIDE 0.9 % SOLUTION 250 ML FLEX CONT: Performed by: NURSE ANESTHETIST, CERTIFIED REGISTERED

## 2025-01-23 PROCEDURE — 22853 INSJ BIOMECHANICAL DEVICE: CPT | Performed by: NEUROLOGICAL SURGERY

## 2025-01-23 PROCEDURE — 25010000002 CEFAZOLIN PER 500 MG

## 2025-01-23 PROCEDURE — 25010000002 LIDOCAINE PF 2% 2 % SOLUTION: Performed by: NURSE ANESTHETIST, CERTIFIED REGISTERED

## 2025-01-23 PROCEDURE — 22551 ARTHRD ANT NTRBDY CERVICAL: CPT | Performed by: NEUROLOGICAL SURGERY

## 2025-01-23 PROCEDURE — 22853 INSJ BIOMECHANICAL DEVICE: CPT

## 2025-01-23 PROCEDURE — 25010000002 PROPOFOL 10 MG/ML EMULSION: Performed by: NURSE ANESTHETIST, CERTIFIED REGISTERED

## 2025-01-23 PROCEDURE — 22846 INSERT SPINE FIXATION DEVICE: CPT

## 2025-01-23 PROCEDURE — 25010000002 HYDROMORPHONE PER 4 MG

## 2025-01-23 PROCEDURE — 25810000003 LACTATED RINGERS PER 1000 ML: Performed by: NEUROLOGICAL SURGERY

## 2025-01-23 PROCEDURE — C1713 ANCHOR/SCREW BN/BN,TIS/BN: HCPCS | Performed by: NEUROLOGICAL SURGERY

## 2025-01-23 PROCEDURE — 25010000002 FENTANYL CITRATE (PF) 50 MCG/ML SOLUTION: Performed by: NURSE ANESTHETIST, CERTIFIED REGISTERED

## 2025-01-23 PROCEDURE — 72040 X-RAY EXAM NECK SPINE 2-3 VW: CPT

## 2025-01-23 PROCEDURE — 25810000003 LACTATED RINGERS PER 1000 ML: Performed by: NURSE ANESTHETIST, CERTIFIED REGISTERED

## 2025-01-23 PROCEDURE — 25010000002 SUCCINYLCHOLINE PER 20 MG: Performed by: NURSE ANESTHETIST, CERTIFIED REGISTERED

## 2025-01-23 PROCEDURE — 76000 FLUOROSCOPY <1 HR PHYS/QHP: CPT

## 2025-01-23 PROCEDURE — 25010000002 DEXAMETHASONE PER 1 MG: Performed by: NURSE ANESTHETIST, CERTIFIED REGISTERED

## 2025-01-23 PROCEDURE — 22551 ARTHRD ANT NTRBDY CERVICAL: CPT

## 2025-01-23 PROCEDURE — 22846 INSERT SPINE FIXATION DEVICE: CPT | Performed by: NEUROLOGICAL SURGERY

## 2025-01-23 PROCEDURE — 22552 ARTHRD ANT NTRBD CERVICAL EA: CPT

## 2025-01-23 PROCEDURE — 22552 ARTHRD ANT NTRBD CERVICAL EA: CPT | Performed by: NEUROLOGICAL SURGERY

## 2025-01-23 PROCEDURE — 25010000002 PHENYLEPHRINE 10 MG/ML SOLUTION 5 ML VIAL: Performed by: NURSE ANESTHETIST, CERTIFIED REGISTERED

## 2025-01-23 PROCEDURE — 25010000002 ONDANSETRON PER 1 MG: Performed by: NURSE ANESTHETIST, CERTIFIED REGISTERED

## 2025-01-23 DEVICE — COALITION MIS SPACER, 12 X 14, 7&DEG;, 6MM
Type: IMPLANTABLE DEVICE | Site: SPINE CERVICAL | Status: FUNCTIONAL
Brand: COALITION MIS

## 2025-01-23 DEVICE — FLOSEAL WITH RECOTHROM - 10ML.
Type: IMPLANTABLE DEVICE | Site: SPINE CERVICAL | Status: FUNCTIONAL
Brand: FLOSEAL HEMOSTATIC MATRIX

## 2025-01-23 DEVICE — DEV CONTRL TISS STRATAFIX SPIRAL MNCRYL UD 3/0 PLS 30CM: Type: IMPLANTABLE DEVICE | Site: SPINE CERVICAL | Status: FUNCTIONAL

## 2025-01-23 DEVICE — COALITION MIS ANCHOR, 11MM
Type: IMPLANTABLE DEVICE | Site: SPINE CERVICAL | Status: FUNCTIONAL
Brand: COALITION MIS

## 2025-01-23 DEVICE — I-FACTOR PUTTY, 2.5CC SYRINGE
Type: IMPLANTABLE DEVICE | Site: SPINE CERVICAL | Status: FUNCTIONAL
Brand: I-FACTOR PEPTIDE ENHANCED BONE GRAFT

## 2025-01-23 DEVICE — ALLOGRFT FIBR OSTEOAMP SELECT 2.5CC: Type: IMPLANTABLE DEVICE | Site: SPINE CERVICAL | Status: FUNCTIONAL

## 2025-01-23 RX ORDER — OXYCODONE HYDROCHLORIDE 5 MG/1
10 TABLET ORAL ONCE
Status: COMPLETED | OUTPATIENT
Start: 2025-01-23 | End: 2025-01-23

## 2025-01-23 RX ORDER — SUCCINYLCHOLINE CHLORIDE 20 MG/ML
INJECTION INTRAMUSCULAR; INTRAVENOUS AS NEEDED
Status: DISCONTINUED | OUTPATIENT
Start: 2025-01-23 | End: 2025-01-23 | Stop reason: SURG

## 2025-01-23 RX ORDER — DULOXETIN HYDROCHLORIDE 30 MG/1
60 CAPSULE, DELAYED RELEASE ORAL DAILY
Status: DISCONTINUED | OUTPATIENT
Start: 2025-01-23 | End: 2025-01-24 | Stop reason: HOSPADM

## 2025-01-23 RX ORDER — FENTANYL CITRATE 50 UG/ML
INJECTION, SOLUTION INTRAMUSCULAR; INTRAVENOUS AS NEEDED
Status: DISCONTINUED | OUTPATIENT
Start: 2025-01-23 | End: 2025-01-23 | Stop reason: SURG

## 2025-01-23 RX ORDER — SODIUM CHLORIDE 9 MG/ML
40 INJECTION, SOLUTION INTRAVENOUS AS NEEDED
Status: DISCONTINUED | OUTPATIENT
Start: 2025-01-23 | End: 2025-01-23 | Stop reason: HOSPADM

## 2025-01-23 RX ORDER — IPRATROPIUM BROMIDE AND ALBUTEROL SULFATE 2.5; .5 MG/3ML; MG/3ML
3 SOLUTION RESPIRATORY (INHALATION) ONCE AS NEEDED
Status: DISCONTINUED | OUTPATIENT
Start: 2025-01-23 | End: 2025-01-23 | Stop reason: HOSPADM

## 2025-01-23 RX ORDER — ACETAMINOPHEN 160 MG/5ML
650 SOLUTION ORAL EVERY 4 HOURS PRN
Status: DISCONTINUED | OUTPATIENT
Start: 2025-01-23 | End: 2025-01-24 | Stop reason: HOSPADM

## 2025-01-23 RX ORDER — PROPOFOL 10 MG/ML
INJECTION, EMULSION INTRAVENOUS CONTINUOUS PRN
Status: DISCONTINUED | OUTPATIENT
Start: 2025-01-23 | End: 2025-01-23 | Stop reason: SURG

## 2025-01-23 RX ORDER — BISACODYL 5 MG/1
5 TABLET, DELAYED RELEASE ORAL DAILY PRN
Status: DISCONTINUED | OUTPATIENT
Start: 2025-01-23 | End: 2025-01-24 | Stop reason: HOSPADM

## 2025-01-23 RX ORDER — ONDANSETRON 2 MG/ML
4 INJECTION INTRAMUSCULAR; INTRAVENOUS EVERY 6 HOURS PRN
Status: DISCONTINUED | OUTPATIENT
Start: 2025-01-23 | End: 2025-01-24 | Stop reason: HOSPADM

## 2025-01-23 RX ORDER — SODIUM CHLORIDE, SODIUM LACTATE, POTASSIUM CHLORIDE, CALCIUM CHLORIDE 600; 310; 30; 20 MG/100ML; MG/100ML; MG/100ML; MG/100ML
1000 INJECTION, SOLUTION INTRAVENOUS CONTINUOUS
Status: DISCONTINUED | OUTPATIENT
Start: 2025-01-23 | End: 2025-01-23

## 2025-01-23 RX ORDER — GABAPENTIN 300 MG/1
300 CAPSULE ORAL 3 TIMES DAILY
Status: DISCONTINUED | OUTPATIENT
Start: 2025-01-23 | End: 2025-01-24 | Stop reason: HOSPADM

## 2025-01-23 RX ORDER — ENOXAPARIN SODIUM 100 MG/ML
40 INJECTION SUBCUTANEOUS DAILY
Status: DISCONTINUED | OUTPATIENT
Start: 2025-01-24 | End: 2025-01-24 | Stop reason: HOSPADM

## 2025-01-23 RX ORDER — ACETAMINOPHEN 325 MG/1
650 TABLET ORAL ONCE AS NEEDED
Status: DISCONTINUED | OUTPATIENT
Start: 2025-01-23 | End: 2025-01-23 | Stop reason: HOSPADM

## 2025-01-23 RX ORDER — DEXMEDETOMIDINE HYDROCHLORIDE 100 UG/ML
INJECTION, SOLUTION INTRAVENOUS AS NEEDED
Status: DISCONTINUED | OUTPATIENT
Start: 2025-01-23 | End: 2025-01-23 | Stop reason: SURG

## 2025-01-23 RX ORDER — LIDOCAINE HYDROCHLORIDE 20 MG/ML
INJECTION, SOLUTION EPIDURAL; INFILTRATION; INTRACAUDAL; PERINEURAL AS NEEDED
Status: DISCONTINUED | OUTPATIENT
Start: 2025-01-23 | End: 2025-01-23 | Stop reason: SURG

## 2025-01-23 RX ORDER — FENTANYL CITRATE 50 UG/ML
25 INJECTION, SOLUTION INTRAMUSCULAR; INTRAVENOUS
Status: DISCONTINUED | OUTPATIENT
Start: 2025-01-23 | End: 2025-01-23 | Stop reason: HOSPADM

## 2025-01-23 RX ORDER — ONDANSETRON 2 MG/ML
4 INJECTION INTRAMUSCULAR; INTRAVENOUS ONCE AS NEEDED
Status: DISCONTINUED | OUTPATIENT
Start: 2025-01-23 | End: 2025-01-23 | Stop reason: HOSPADM

## 2025-01-23 RX ORDER — HYDRALAZINE HYDROCHLORIDE 20 MG/ML
10 INJECTION INTRAMUSCULAR; INTRAVENOUS EVERY 6 HOURS PRN
Status: DISCONTINUED | OUTPATIENT
Start: 2025-01-23 | End: 2025-01-24 | Stop reason: HOSPADM

## 2025-01-23 RX ORDER — OXYCODONE HYDROCHLORIDE 5 MG/1
5 TABLET ORAL ONCE AS NEEDED
Status: COMPLETED | OUTPATIENT
Start: 2025-01-23 | End: 2025-01-23

## 2025-01-23 RX ORDER — ONDANSETRON 4 MG/1
4 TABLET, ORALLY DISINTEGRATING ORAL EVERY 6 HOURS PRN
Status: DISCONTINUED | OUTPATIENT
Start: 2025-01-23 | End: 2025-01-24 | Stop reason: HOSPADM

## 2025-01-23 RX ORDER — LIDOCAINE HYDROCHLORIDE AND EPINEPHRINE 10; 10 MG/ML; UG/ML
INJECTION, SOLUTION INFILTRATION; PERINEURAL AS NEEDED
Status: DISCONTINUED | OUTPATIENT
Start: 2025-01-23 | End: 2025-01-23 | Stop reason: HOSPADM

## 2025-01-23 RX ORDER — AMOXICILLIN 250 MG
2 CAPSULE ORAL 2 TIMES DAILY PRN
Status: DISCONTINUED | OUTPATIENT
Start: 2025-01-23 | End: 2025-01-24 | Stop reason: HOSPADM

## 2025-01-23 RX ORDER — HYDRALAZINE HYDROCHLORIDE 20 MG/ML
5 INJECTION INTRAMUSCULAR; INTRAVENOUS
Status: DISCONTINUED | OUTPATIENT
Start: 2025-01-23 | End: 2025-01-23 | Stop reason: HOSPADM

## 2025-01-23 RX ORDER — CYCLOBENZAPRINE HCL 10 MG
10 TABLET ORAL 3 TIMES DAILY PRN
Status: DISCONTINUED | OUTPATIENT
Start: 2025-01-23 | End: 2025-01-24 | Stop reason: HOSPADM

## 2025-01-23 RX ORDER — LIDOCAINE HYDROCHLORIDE 10 MG/ML
0.5 INJECTION, SOLUTION EPIDURAL; INFILTRATION; INTRACAUDAL; PERINEURAL ONCE AS NEEDED
Status: DISCONTINUED | OUTPATIENT
Start: 2025-01-23 | End: 2025-01-23 | Stop reason: HOSPADM

## 2025-01-23 RX ORDER — SODIUM CHLORIDE 0.9 % (FLUSH) 0.9 %
10 SYRINGE (ML) INJECTION AS NEEDED
Status: DISCONTINUED | OUTPATIENT
Start: 2025-01-23 | End: 2025-01-23 | Stop reason: HOSPADM

## 2025-01-23 RX ORDER — HYDROMORPHONE HYDROCHLORIDE 1 MG/ML
0.25 INJECTION, SOLUTION INTRAMUSCULAR; INTRAVENOUS; SUBCUTANEOUS EVERY 4 HOURS PRN
Status: DISCONTINUED | OUTPATIENT
Start: 2025-01-23 | End: 2025-01-24 | Stop reason: HOSPADM

## 2025-01-23 RX ORDER — ACETAMINOPHEN 325 MG/1
650 TABLET ORAL EVERY 4 HOURS PRN
Status: DISCONTINUED | OUTPATIENT
Start: 2025-01-23 | End: 2025-01-24 | Stop reason: HOSPADM

## 2025-01-23 RX ORDER — ONDANSETRON 2 MG/ML
INJECTION INTRAMUSCULAR; INTRAVENOUS AS NEEDED
Status: DISCONTINUED | OUTPATIENT
Start: 2025-01-23 | End: 2025-01-23 | Stop reason: SURG

## 2025-01-23 RX ORDER — SODIUM CHLORIDE, SODIUM LACTATE, POTASSIUM CHLORIDE, CALCIUM CHLORIDE 600; 310; 30; 20 MG/100ML; MG/100ML; MG/100ML; MG/100ML
INJECTION, SOLUTION INTRAVENOUS CONTINUOUS PRN
Status: DISCONTINUED | OUTPATIENT
Start: 2025-01-23 | End: 2025-01-23 | Stop reason: SURG

## 2025-01-23 RX ORDER — TRAMADOL HYDROCHLORIDE 50 MG/1
50 TABLET ORAL EVERY 6 HOURS PRN
Status: DISCONTINUED | OUTPATIENT
Start: 2025-01-23 | End: 2025-01-24 | Stop reason: HOSPADM

## 2025-01-23 RX ORDER — CELECOXIB 200 MG/1
200 CAPSULE ORAL ONCE
Status: COMPLETED | OUTPATIENT
Start: 2025-01-23 | End: 2025-01-23

## 2025-01-23 RX ORDER — NALOXONE HCL 0.4 MG/ML
0.4 VIAL (ML) INJECTION
Status: DISCONTINUED | OUTPATIENT
Start: 2025-01-23 | End: 2025-01-24 | Stop reason: HOSPADM

## 2025-01-23 RX ORDER — PROPOFOL 10 MG/ML
VIAL (ML) INTRAVENOUS AS NEEDED
Status: DISCONTINUED | OUTPATIENT
Start: 2025-01-23 | End: 2025-01-23 | Stop reason: SURG

## 2025-01-23 RX ORDER — OXYCODONE HYDROCHLORIDE 5 MG/1
10 TABLET ORAL EVERY 4 HOURS PRN
Status: DISCONTINUED | OUTPATIENT
Start: 2025-01-23 | End: 2025-01-23 | Stop reason: HOSPADM

## 2025-01-23 RX ORDER — ACETAMINOPHEN 650 MG/1
650 SUPPOSITORY RECTAL EVERY 4 HOURS PRN
Status: DISCONTINUED | OUTPATIENT
Start: 2025-01-23 | End: 2025-01-24 | Stop reason: HOSPADM

## 2025-01-23 RX ORDER — SODIUM CHLORIDE 0.9 % (FLUSH) 0.9 %
10 SYRINGE (ML) INJECTION EVERY 12 HOURS SCHEDULED
Status: DISCONTINUED | OUTPATIENT
Start: 2025-01-23 | End: 2025-01-24 | Stop reason: HOSPADM

## 2025-01-23 RX ORDER — LABETALOL HYDROCHLORIDE 5 MG/ML
5 INJECTION, SOLUTION INTRAVENOUS
Status: DISCONTINUED | OUTPATIENT
Start: 2025-01-23 | End: 2025-01-23 | Stop reason: HOSPADM

## 2025-01-23 RX ORDER — HYDROCHLOROTHIAZIDE 12.5 MG/1
12.5 TABLET ORAL DAILY
Status: DISCONTINUED | OUTPATIENT
Start: 2025-01-24 | End: 2025-01-24 | Stop reason: HOSPADM

## 2025-01-23 RX ORDER — SODIUM CHLORIDE 0.9 % (FLUSH) 0.9 %
10 SYRINGE (ML) INJECTION AS NEEDED
Status: DISCONTINUED | OUTPATIENT
Start: 2025-01-23 | End: 2025-01-24 | Stop reason: HOSPADM

## 2025-01-23 RX ORDER — LISINOPRIL 20 MG/1
20 TABLET ORAL
Status: DISCONTINUED | OUTPATIENT
Start: 2025-01-23 | End: 2025-01-24 | Stop reason: HOSPADM

## 2025-01-23 RX ORDER — POLYETHYLENE GLYCOL 3350 17 G/17G
17 POWDER, FOR SOLUTION ORAL DAILY PRN
Status: DISCONTINUED | OUTPATIENT
Start: 2025-01-23 | End: 2025-01-24 | Stop reason: HOSPADM

## 2025-01-23 RX ORDER — ACETAMINOPHEN 500 MG
1000 TABLET ORAL ONCE
Status: COMPLETED | OUTPATIENT
Start: 2025-01-23 | End: 2025-01-23

## 2025-01-23 RX ORDER — BISACODYL 10 MG
10 SUPPOSITORY, RECTAL RECTAL DAILY PRN
Status: DISCONTINUED | OUTPATIENT
Start: 2025-01-23 | End: 2025-01-24 | Stop reason: HOSPADM

## 2025-01-23 RX ORDER — SODIUM CHLORIDE 0.9 % (FLUSH) 0.9 %
10 SYRINGE (ML) INJECTION EVERY 12 HOURS SCHEDULED
Status: DISCONTINUED | OUTPATIENT
Start: 2025-01-23 | End: 2025-01-23 | Stop reason: HOSPADM

## 2025-01-23 RX ORDER — DEXAMETHASONE SODIUM PHOSPHATE 4 MG/ML
INJECTION, SOLUTION INTRA-ARTICULAR; INTRALESIONAL; INTRAMUSCULAR; INTRAVENOUS; SOFT TISSUE AS NEEDED
Status: DISCONTINUED | OUTPATIENT
Start: 2025-01-23 | End: 2025-01-23 | Stop reason: SURG

## 2025-01-23 RX ORDER — NALOXONE HCL 0.4 MG/ML
0.4 VIAL (ML) INJECTION AS NEEDED
Status: DISCONTINUED | OUTPATIENT
Start: 2025-01-23 | End: 2025-01-23 | Stop reason: HOSPADM

## 2025-01-23 RX ORDER — HYDROCODONE BITARTRATE AND ACETAMINOPHEN 5; 325 MG/1; MG/1
1 TABLET ORAL EVERY 4 HOURS PRN
Status: DISCONTINUED | OUTPATIENT
Start: 2025-01-23 | End: 2025-01-24 | Stop reason: HOSPADM

## 2025-01-23 RX ORDER — PANTOPRAZOLE SODIUM 40 MG/1
40 TABLET, DELAYED RELEASE ORAL
Status: DISCONTINUED | OUTPATIENT
Start: 2025-01-24 | End: 2025-01-24 | Stop reason: HOSPADM

## 2025-01-23 RX ORDER — SODIUM CHLORIDE 9 MG/ML
40 INJECTION, SOLUTION INTRAVENOUS AS NEEDED
Status: DISCONTINUED | OUTPATIENT
Start: 2025-01-23 | End: 2025-01-24 | Stop reason: HOSPADM

## 2025-01-23 RX ADMIN — Medication 10 ML: at 20:45

## 2025-01-23 RX ADMIN — HYDROMORPHONE HYDROCHLORIDE 0.5 MG: 1 INJECTION, SOLUTION INTRAMUSCULAR; INTRAVENOUS; SUBCUTANEOUS at 14:58

## 2025-01-23 RX ADMIN — PHENYLEPHRINE HYDROCHLORIDE 0.5 MCG/KG/MIN: 10 INJECTION INTRAVENOUS at 13:19

## 2025-01-23 RX ADMIN — DULOXETINE 60 MG: 30 CAPSULE, DELAYED RELEASE ORAL at 15:56

## 2025-01-23 RX ADMIN — TRAMADOL HYDROCHLORIDE 50 MG: 50 TABLET, COATED ORAL at 20:45

## 2025-01-23 RX ADMIN — OXYCODONE 10 MG: 5 TABLET ORAL at 12:04

## 2025-01-23 RX ADMIN — CEFAZOLIN 2 G: 2 INJECTION, POWDER, FOR SOLUTION INTRAMUSCULAR; INTRAVENOUS at 12:18

## 2025-01-23 RX ADMIN — FENTANYL CITRATE 50 MCG: 50 INJECTION, SOLUTION INTRAMUSCULAR; INTRAVENOUS at 14:17

## 2025-01-23 RX ADMIN — LIDOCAINE HYDROCHLORIDE 60 MG: 20 SOLUTION INTRAVENOUS at 12:27

## 2025-01-23 RX ADMIN — GABAPENTIN 300 MG: 300 CAPSULE ORAL at 15:56

## 2025-01-23 RX ADMIN — FENTANYL CITRATE 50 MCG: 50 INJECTION, SOLUTION INTRAMUSCULAR; INTRAVENOUS at 14:49

## 2025-01-23 RX ADMIN — PROPOFOL 40 MG: 10 INJECTION, EMULSION INTRAVENOUS at 12:37

## 2025-01-23 RX ADMIN — ACETAMINOPHEN 1000 MG: 500 TABLET, FILM COATED ORAL at 12:04

## 2025-01-23 RX ADMIN — GABAPENTIN 300 MG: 300 CAPSULE ORAL at 20:45

## 2025-01-23 RX ADMIN — CELECOXIB 200 MG: 200 CAPSULE ORAL at 12:04

## 2025-01-23 RX ADMIN — DEXAMETHASONE SODIUM PHOSPHATE 8 MG: 4 INJECTION, SOLUTION INTRAMUSCULAR; INTRAVENOUS at 12:38

## 2025-01-23 RX ADMIN — OXYCODONE 5 MG: 5 TABLET ORAL at 14:58

## 2025-01-23 RX ADMIN — SUCCINYLCHOLINE CHLORIDE 100 MG: 20 INJECTION, SOLUTION INTRAMUSCULAR; INTRAVENOUS at 12:27

## 2025-01-23 RX ADMIN — ONDANSETRON 4 MG: 2 INJECTION, SOLUTION INTRAMUSCULAR; INTRAVENOUS at 13:51

## 2025-01-23 RX ADMIN — DEXMEDETOMIDINE HYDROCHLORIDE 8 MCG: 100 INJECTION, SOLUTION INTRAVENOUS at 12:53

## 2025-01-23 RX ADMIN — PROPOFOL INJECTABLE EMULSION 150 MCG/KG/MIN: 10 INJECTION, EMULSION INTRAVENOUS at 12:28

## 2025-01-23 RX ADMIN — PROPOFOL INJECTABLE EMULSION 50 MG: 10 INJECTION, EMULSION INTRAVENOUS at 12:53

## 2025-01-23 RX ADMIN — SODIUM CHLORIDE, POTASSIUM CHLORIDE, SODIUM LACTATE AND CALCIUM CHLORIDE 1000 ML: 600; 310; 30; 20 INJECTION, SOLUTION INTRAVENOUS at 12:04

## 2025-01-23 RX ADMIN — HYDROMORPHONE HYDROCHLORIDE 0.25 MG: 1 INJECTION, SOLUTION INTRAMUSCULAR; INTRAVENOUS; SUBCUTANEOUS at 17:02

## 2025-01-23 RX ADMIN — CYCLOBENZAPRINE HYDROCHLORIDE 10 MG: 10 TABLET, FILM COATED ORAL at 15:55

## 2025-01-23 RX ADMIN — FENTANYL CITRATE 50 MCG: 50 INJECTION, SOLUTION INTRAMUSCULAR; INTRAVENOUS at 12:37

## 2025-01-23 RX ADMIN — DEXMEDETOMIDINE HYDROCHLORIDE 4 MCG: 100 INJECTION, SOLUTION INTRAVENOUS at 12:49

## 2025-01-23 RX ADMIN — SODIUM CHLORIDE, SODIUM LACTATE, POTASSIUM CHLORIDE, AND CALCIUM CHLORIDE: .6; .31; .03; .02 INJECTION, SOLUTION INTRAVENOUS at 12:21

## 2025-01-23 RX ADMIN — TRAZODONE HYDROCHLORIDE 150 MG: 100 TABLET ORAL at 23:35

## 2025-01-23 RX ADMIN — LISINOPRIL 20 MG: 20 TABLET ORAL at 15:56

## 2025-01-23 RX ADMIN — CEFAZOLIN 2000 MG: 2 INJECTION, POWDER, FOR SOLUTION INTRAMUSCULAR; INTRAVENOUS at 20:45

## 2025-01-23 RX ADMIN — PROPOFOL 110 MG: 10 INJECTION, EMULSION INTRAVENOUS at 12:27

## 2025-01-23 RX ADMIN — FENTANYL CITRATE 50 MCG: 50 INJECTION, SOLUTION INTRAMUSCULAR; INTRAVENOUS at 12:27

## 2025-01-23 RX ADMIN — HYDROMORPHONE HYDROCHLORIDE 0.5 MG: 1 INJECTION, SOLUTION INTRAMUSCULAR; INTRAVENOUS; SUBCUTANEOUS at 14:29

## 2025-01-23 NOTE — PLAN OF CARE
Goal Outcome Evaluation:      Patient to SIPS from PACU, resting with spouse at bedside, care plan started

## 2025-01-23 NOTE — CONSULTS
74-year-old patient vertigo hypertension neck pain is admitted in surgical unit for the elective surgery cervical 3-6 anterior cervical adnexectomy and fusion by Dr. Liu.  Patient blood pressure is mildly elevated given her pain from surgery  site.  No other complaint during encounter.    For hypertension can continue current medication.  Hydralazine as needed added with parameters.    Physical Exam  HENT:      Head: Normocephalic and atraumatic.      Nose: Nose normal.   Neck - surgery site with clean dressing   Eyes:      Extraocular Movements: Extraocular movements intact.      Conjunctivae/sclerae: Conjunctivae normal.      Pupils: Pupils are equal, round, and reactive to light.   Cardiovascular:      Rate and Rhythm: Normal       Pulses: Normal pulses.      Heart sounds: Normal heart sounds.   Pulmonary:      Effort: normal      Breath sounds: normal   Abdominal:      General: Abdomen is flat. Bowel sounds are normal.      Palpations: Abdomen is soft.   Musculoskeletal:         General: Normal range of motion.      Cervical back: Normal range of motion and neck supple.   Skin:     General: Skin is dry.   Neurological:      General: No focal deficit present.      Mental Status: alert.   Psychiatric:         Mood and Affect: Mood normal.           ENDOSCOPY;  Service: Gastroenterology;  Laterality: N/A;  gastric nodule,  hiatal hernia    EXPLORATORY LAPAROTOMY N/A 2021    Procedure: LAPAROTOMY EXPLORATORY with RIGHT COLON RESECTION;  Surgeon: Pedro Wren DO;  Location: [facility] MAIN OR;  Service: General;  Laterality: N/A;    HERNIA REPAIR      THUMB ARTHROSCOPY Bilateral     thumb fusion      Social History     Socioeconomic History    Marital status:      Spouse name: Ricardo   Tobacco Use    Smoking status: Former     Current packs/day: 0.00     Average packs/day: 0.3 packs/day for 19.2 years (5.8 ttl pk-yrs)     Types: Cigarettes     Start date:      Quit date: 3/22/2011     Years since quittin.8     Passive exposure: Past    Smokeless tobacco: Never    Tobacco comments:     Social smoker, quit 11 years ago   Vaping Use    Vaping status: Never Used   Substance and Sexual Activity    Alcohol use: Not Currently     Alcohol/week: 7.0 standard drinks of alcohol    Drug use: Never     Types: Marijuana    Sexual activity: Not Currently     Partners: Male     Birth control/protection: Vasectomy      Family History   Problem Relation Age of Onset    Heart disease Mother     Stomach cancer Father     Heart disease Brother       Allergies   Allergen Reactions    Hydroxychloroquine Sulfate Other (See Comments)     Build up on cornea; affected night vision        Home Medications     Prior to Admission medications    Medication Sig Start Date End Date Taking? Authorizing Provider   alendronate (FOSAMAX) 70 MG tablet TAKE 1 TABLET BY MOUTH ONCE A WEEK IN THE MORNING AT LEAST 30 MINUTES BEFORE FIRST FOOD, BEVERAGE OR MEDICATION OF THE DAY 23  Yes Ry Velasco MD   aspirin 81 MG EC tablet Take 1 tablet by mouth Daily. 25  Yes Reyna Bryan APRN   benazepril (LOTENSIN) 20 MG tablet Take 1 tablet by mouth Daily.   Yes Ry Velasco MD   Biotin 10 MG capsule Take  by mouth.   Yes Ry Velasco MD   calcium  carbonate (OS-LILIANA) 600 MG tablet Take 1 tablet by mouth 2 (Two) Times a Day With Meals.   Yes Ry Velasco MD   cyclobenzaprine (FLEXERIL) 10 MG tablet Take 1 tablet by mouth 3 (Three) Times a Day As Needed for Muscle Spasms. 1/24/25  Yes Reyna Bryan APRN   doxycycline (PERIOSTAT) 20 MG tablet TAKE 1 TABLET BY MOUTH WITH BREAKFAST AND 1 ONCE DAILY WITH SUPPER   Yes Ry Velasco MD   DULoxetine (Cymbalta) 60 MG capsule Take 1 capsule by mouth Daily. For neuropathic pain 10/22/24  Yes Leticia Wang APRN   gabapentin (NEURONTIN) 300 MG capsule Take 1 capsule by mouth 3 (Three) Times a Day.   Yes Ry Velasco MD   hydroCHLOROthiazide (HYDRODIURIL) 12.5 MG tablet Take 1 tablet by mouth Daily.   Yes Ry Velasco MD   Multiple Vitamins-Minerals (PRESERVISION AREDS 2 PO) Take 2 tablets by mouth Daily.   Yes Ry Velasco MD   Omega-3 Fatty Acids (fish oil) 1000 MG capsule capsule Take 2 capsules by mouth Daily With Breakfast.   Yes Ry Velasco MD   omeprazole (priLOSEC) 40 MG capsule Take 1 capsule by mouth Daily. before a meal   Yes Ry Velasco MD   sennosides-docusate (PERICOLACE) 8.6-50 MG per tablet Take 2 tablets by mouth 2 (Two) Times a Day As Needed for Constipation. 1/24/25  Yes Reyna Bryan APRN   traZODone (DESYREL) 150 MG tablet Take 2 tablets by mouth every night at bedtime. 11/18/23  Yes Ry Velasco MD   naloxone (NARCAN) 4 MG/0.1ML nasal spray Call 911. Don't prime. [place] in 1 nostril for overdose. Repeat in 2-3 minutes in other nostril if no or minimal breathing/responsiveness. 1/24/25   Reyna Bryan APRN   NON FORMULARY / PATIENT SUPPLIED MEDICATION Insert 2 mL into the vagina 2 (Two) Times a Week. Estriol 0.5% cream-   Insert 2ml twice weekly on Tuesdays and Fridays    Ry Velasco MD   oxyCODONE-acetaminophen (Percocet) 5-325 MG per tablet Take 1 tablet by mouth Every 6 (Six) Hours As Needed for Severe Pain.  "1/30/25   IrvinReyna APRREBECCA      Objective / Physical Exam   Vital signs:  Temp: 97.6 °F (36.4 °C)  BP: 132/62  Heart Rate: 65  Resp: 16  SpO2: 100 %  Weight: 57.4 kg (126 lb 9.6 oz)    Admission Weight: Weight: 58.1 kg (128 lb)    Physical Exam   Physical Exam  HENT:      Head: Normocephalic and atraumatic.      Nose: Nose normal.   Neck - surgery site with clean dressing   Eyes:      Extraocular Movements: Extraocular movements intact.      Conjunctivae/sclerae: Conjunctivae normal.      Pupils: Pupils are equal, round, and reactive to light.   Cardiovascular:      Rate and Rhythm: Normal       Pulses: Normal pulses.      Heart sounds: Normal heart sounds.   Pulmonary:      Effort: normal      Breath sounds: normal   Abdominal:      General: Abdomen is flat. Bowel sounds are normal.      Palpations: Abdomen is soft.   Musculoskeletal:         General: Normal range of motion.      Cervical back: Normal range of motion and neck supple.   Skin:     General: Skin is dry.   Neurological:      General: No focal deficit present.      Mental Status: alert.   Psychiatric:         Mood and Affect: Mood normal.        Labs         Invalid input(s): \"HBG\"          Current Medications   Scheduled Meds:     Continuous Infusions:No current facility-administered medications for this encounter.           "

## 2025-01-23 NOTE — ANESTHESIA POSTPROCEDURE EVALUATION
Patient: Mariajose Tabor    Procedure Summary       Date: 01/23/25 Room / Location: [facility] OR 02 / [facility] MAIN OR    Anesthesia Start: 1221 Anesthesia Stop: 1413    Procedure: Cervical 3-6 anterior cervical discectomy and fusion (Spine Cervical) Diagnosis:       Cervical stenosis of spine      (Cervical stenosis of spine [M48.02])    Surgeons: Pedro Liu IV, MD Provider: Dennis Delong MD    Anesthesia Type: general, ERAS Protocol ASA Status: 3            Anesthesia Type: general, ERAS Protocol    Vitals  Vitals Value Taken Time   /57 01/23/25 1501   Temp 97.9 °F (36.6 °C) 01/23/25 1412   Pulse 57 01/23/25 1504   Resp 11 01/23/25 1500   SpO2 100 % 01/23/25 1504   Vitals shown include unfiled device data.        Post Anesthesia Care and Evaluation    Patient location during evaluation: PACU  Patient participation: complete - patient participated  Level of consciousness: awake  Pain scale: See nurse's notes for pain score.  Pain management: adequate    Airway patency: patent  Anesthetic complications: No anesthetic complications  PONV Status: none  Cardiovascular status: acceptable  Respiratory status: acceptable and spontaneous ventilation  Hydration status: acceptable    Comments: Patient seen and examined postoperatively; vital signs stable; SpO2 greater than or equal to 90%; cardiopulmonary status stable; nausea/vomiting adequately controlled; pain adequately controlled; no apparent anesthesia complications; patient discharged from anesthesia care when discharge criteria were met

## 2025-01-23 NOTE — OP NOTE
CERVICAL DISCECTOMY ANTERIOR FUSION WITH INSTRUMENTATION  Procedure Report    Patient Name:  Mariajose Tabor  YOB: 1950    Date of Surgery:  1/23/2025     Indications: 74-year-old female with a history of neck pain and occipital headaches as well as symptoms most consistent with peripheral neuropathy who was found to have moderate to severe central stenosis on MRI of her cervical spine.  Given the severity of the stenosis or potential for spinal cord injury or progressively worsening myelopathy decision was made to proceed with C3-6 ACDF for decompression of the spinal cord to prevent future injury.  Patient understood the risks of surgery including bleeding infection CSF leak nerve damage spinal cord injury vascular injury stroke injury to neck structure including esophagus trachea recurrent laryngeal nerve permanent hoarseness and/or dysphagia hardware failure pseudoarthrosis need for future operation among many other risks and she agreed to undergo the procedure    Pre-op Diagnosis:   Cervical stenosis of spine [M48.02]       Post-Op Diagnosis Codes:     * Cervical stenosis of spine [M48.02]    Procedure/CPT® Codes:      Procedure(s):  C3-4, C4-5, C5-6 anterior cervical discectomy for arthrodesis  C3-4, C4-5, C5-6 placement of globus peek interbody cage  C3-C6 anterior instrumentation with globus plates and fixation pins        Spinal Surgery Levels Completed:3 Levels      Staff:  Surgeon(s):  Pedro Liu IV, MD    Assistant: Cem Mclean PA    Anesthesia: General    Estimated Blood Loss:  50cc    Implants:    Implant Name Type Inv. Item Serial No.  Lot No. LRB No. Used Action   DEV CONTRL TISS STRATAFIX SPIRAL MNCRYL UD 3/0 PLS 30CM - [id number] Implant DEV CONTRL TISS STRATAFIX SPIRAL MNCRYL UD 3/0 PLS 30CM  [employer]  DIV OF J AND J [id number] N/A 1 Implanted   KT SEAL HEMOS ABS FLOSEAL MATRX FAST/PREP 10ML - [id number] Implant KT SEAL HEMOS ABS FLOSEAL MATRX FAST/PREP  10ML  [employer]AL[employer] [id number] N/A 1 Implanted   GRFT BONE IFACTOR PUTTY 2.5ML - [id number] Implant GRFT BONE IFACTOR PUTTY 2.5ML  [employer] [id number] N/A 1 Implanted   ALLOGRFT FIBR OSTEOAMP SELECT 2.5CC - [id number] - [id number] Implant ALLOGRFT FIBR OSTEOAMP SELECT 2.5CC [id number] [employer] . N/A 1 Implanted   KT SEAL HEMOS ABS FLOSEAL MATRX 1.5/FAST/PREP 5000/IU 10ML - [id number] Implant KT SEAL HEMOS ABS FLOSEAL MATRX 1.5/FAST/PREP 5000/IU 10ML  SALDANA Convr[employer] [id number] N/A 1 Implanted   SPACR ACDF COALITIONMIS 7DEG [id number] - [id number] Implant SPACR ACDF COALITIONMIS 7DEG [id number]  [employer] . N/A 3 Implanted   ANCHR COALITIONMIS 11MM - [id number] Implant ANCHR COALITIONMIS 11MM  [employer] . N/A 6 Implanted       Specimen:          None        Findings: Stenosis    Complications: None    Description of Procedure: Patient was brought to the OR and placed under general endotracheal anesthesia.  She was placed supine on a regular or table with her neck slightly extended and prepped and draped in the usual fashion.  Incision was planned along the C4-5 disc space under AP and lateral fluoroscopic guidance.  Incision was made carried down to the deep dermal and fat layers with monopolar cautery.  Self-retaining retractor was placed exposing the platysma which was  from underlying neck structures and cut sharply with Metzenbaum scissors.  Planes the neck were followed down to the anterior cervical spine.  C4-5 disc base was identified under lateral fluoroscopy and longus coli muscles were detached from the anterior cervical spine from C3-C6.  Shadow line retractor system was placed at C3-4 and Carriere pins were placed in the C3 and C4 vertebral bodies under fluoroscopic guidance.  Carriere retractor system was placed and opened thus opening the disc base.  Curette and pituitary rongeur was used to remove soft disc and cartilaginous endplate from the disc space.  3 Kerrison was used to  remove anterior osteophyte.  Microscope was brought in the field.  High-speed bur was used to remove remaining anterior osteophyte as well as cartilaginous endplate.  High-speed bur was used to remove posterior osteophytes.  Posterior longitudinal ligament was opened and removed with 2 Kerrison performing central and foraminal decompression.  Hemostasis was achieved with bipolar cautery and Gelfoam powder and the area was thoroughly irrigated.  Globus peek interbody cage was passed into the disc space under fluoroscopic guidance.  The cage was filled with iFactor and allograft for fusion.  Anterior and cementation was performed with a globus titanium plate and fixation pins under fluoroscopic guidance and locking caps final tightened.  Retractor systems were removed to the C4-5 level and the same series of steps were undertaken with discectomy and removal of anterior and posterior osteophytes removal of PLL for central and foraminal decompression followed by hemostasis irrigation of the disc space and placement of globus peek interbody cage filled with iFactor and allograft and anterior instrumentation with globus titanium plate and fixation pins all under fluoroscopic guidance.  Locking caps final tightened.  Retractor systems removed down to the C5-6 disc space for the same series of steps were undertaken including discectomy and endplate preparation removal of anterior and posterior osteophytes removal of PLL for central and foraminal decompression hemostasis and irrigation of the disc space followed by placement of globus peek interbody cage filled with iFactor and allograft for fusion and anterior instrumentation with globus titanium plate and fixation pins all under fluoroscopic guidance.  Locking caps final tightened.  Retractor systems were removed and hemostasis was achieved with bipolar cautery and Gelfoam powder.  Wound was thoroughly irrigated.  Final x-rays demonstrated good positioning of all hardware.   Platysma was closed with 3-0 Vicryl sutures, the deep dermal layer with 3-0 Vicryl sutures and the skin was closed with a running subcuticular Monocryl.  Dermabond was placed over the wound.  There were no changes in neuromonitoring throughout the case                Assistant: Cem Mclean PA  was responsible for performing the following activities: Retraction, Suction, Irrigation, Suturing, Closing, and Placing Dressing and their skilled assistance was necessary for the success of this case.    Pedro Liu IV, MD     Date: 1/23/2025  Time: 13:57 EST

## 2025-01-23 NOTE — H&P
History of Present Illness: Mariajose Tabor is a 74 y.o. female with chronic neck pain as well as pain that will radiate into her occipital region causing headaches.  She has had upper extremity pain before but this has been years ago and got better with epidurals.  Currently she denies any significant upper extremity symptoms including no weakness numbness paresthesias or pain.  No difficulty using her hands or dropping things.  She does complain of some mild balance issues.     In terms of her legs, she complains of burning and churning sort of feeling with occasional spasms.  The pain will radiate and can be associated with numbness and tingling.  She has been extensively worked up for this, with lab work pending from neurology and she is also planning to see rheumatology.  Vascular surgery has ruled out vascular claudication.  Pain can occur at any time but primarily standing but improves when she is walking.  No overt weakness.    No changes since she was last seen         Chief Complaint   Patient presents with    Back Pain    Neck Pain    Arm Pain    Leg Pain            Previous treatment: NSAID'S, Gabapentin, PT in the past,     Previous neurosurgery:  None     Previous injections: Nothing recent,     The following portions of the patient's history were reviewed and updated as appropriate: allergies, current medications, past family history, past medical history, past social history, past surgical history, and problem list.     Review of Systems   Constitutional:  Positive for activity change.   HENT: Negative.     Eyes: Negative.    Respiratory:  Negative for chest tightness and shortness of breath.    Cardiovascular: Negative.    Gastrointestinal: Negative.    Endocrine: Negative.    Genitourinary: Negative.    Musculoskeletal:  Positive for arthralgias, back pain, gait problem, myalgias and neck pain.        + arm and leg pain   Skin: Negative.    Allergic/Immunologic: Negative.    Neurological:  Positive  for numbness.   Hematological: Negative.             Objective    Neurologic Exam      Mental Status   Oriented to person, place, and time.      Motor Exam      Strength   Strength 5/5 throughout.      Sensory Exam   Light touch normal.      Gait, Coordination, and Reflexes      Reflexes   Right Reid: absent  Left Reid: absent           Assessment & Plan    Medical Decision Making:       Mariajose Tabor is a 74 y.o. female with a history of neck pain and occipital headaches who is primarily issues at this time are lower extremity burning tightness spasms and numbness.  I do not have an obvious explanation for the symptoms based on the patient's imaging.  It is possible that her cervical stenosis could be contributing to this, however I would expect her to have more significant upper extremity symptoms.  I encouraged her to continue workup with neurology and rheumatology.  The patient has significant central stenosis in her cervical spine.  I do not think she is overtly symptomatic at this time, but the degree of stenosis is concerning.  Given her increased risk of spinal cord injury or progressive symptomology in the future we will proceed with C3-6 ACDF to prevent spinal cord injury or progressive myelopathy.  Patient understands the risks of surgery as well as increased risk due to medical comorbidities including myeloma GERD hypertension peripheral neuropathy among other medical comorbidities and she has agreed to undergo the procedure

## 2025-01-23 NOTE — ANESTHESIA PROCEDURE NOTES
Airway  Urgency: elective    Date/Time: 1/23/2025 12:29 PM  Airway not difficult    General Information and Staff    Patient location during procedure: OR  SRNA: Danny Voss SRNA  Indications and Patient Condition  Indications for airway management: airway protection    Preoxygenated: yes  Mask difficulty assessment: 0 - not attempted    Final Airway Details  Final airway type: endotracheal airway      Successful airway: ETT  Cuffed: yes   Successful intubation technique: video laryngoscopy  Facilitating devices/methods: intubating stylet  Endotracheal tube insertion site: oral  Blade: Barry  Blade size: 3  ETT size (mm): 7.0  Cormack-Lehane Classification: grade I - full view of glottis  Placement verified by: chest auscultation and capnometry   Cuff volume (mL): 8  Measured from: lips  ETT/EBT  to lips (cm): 21  Number of attempts at approach: 1  Assessment: lips, teeth, and gum same as pre-op and atraumatic intubation

## 2025-01-24 ENCOUNTER — APPOINTMENT (OUTPATIENT)
Dept: GENERAL RADIOLOGY | Facility: HOSPITAL | Age: 75
End: 2025-01-24
Payer: MEDICARE

## 2025-01-24 VITALS
DIASTOLIC BLOOD PRESSURE: 62 MMHG | OXYGEN SATURATION: 100 % | HEIGHT: 63 IN | WEIGHT: 126.6 LBS | TEMPERATURE: 97.6 F | RESPIRATION RATE: 16 BRPM | BODY MASS INDEX: 22.43 KG/M2 | HEART RATE: 65 BPM | SYSTOLIC BLOOD PRESSURE: 132 MMHG

## 2025-01-24 LAB
ANION GAP SERPL CALCULATED.3IONS-SCNC: 9.6 MMOL/L (ref 5–15)
BASOPHILS # BLD AUTO: 0 10*3/MM3 (ref 0–0.2)
BASOPHILS NFR BLD AUTO: 0 % (ref 0–1.5)
BUN SERPL-MCNC: 16 MG/DL (ref 8–23)
BUN/CREAT SERPL: 22.2 (ref 7–25)
CALCIUM SPEC-SCNC: 8.9 MG/DL (ref 8.6–10.5)
CHLORIDE SERPL-SCNC: 96 MMOL/L (ref 98–107)
CO2 SERPL-SCNC: 27.4 MMOL/L (ref 22–29)
CREAT SERPL-MCNC: 0.72 MG/DL (ref 0.57–1)
DEPRECATED RDW RBC AUTO: 46.7 FL (ref 37–54)
EGFRCR SERPLBLD CKD-EPI 2021: 89 ML/MIN/1.73
EOSINOPHIL # BLD AUTO: 0 10*3/MM3 (ref 0–0.4)
EOSINOPHIL NFR BLD AUTO: 0 % (ref 0.3–6.2)
ERYTHROCYTE [DISTWIDTH] IN BLOOD BY AUTOMATED COUNT: 14.5 % (ref 12.3–15.4)
GLUCOSE SERPL-MCNC: 162 MG/DL (ref 65–99)
HCT VFR BLD AUTO: 33.6 % (ref 34–46.6)
HGB BLD-MCNC: 10.8 G/DL (ref 12–15.9)
IMM GRANULOCYTES # BLD AUTO: 0.02 10*3/MM3 (ref 0–0.05)
IMM GRANULOCYTES NFR BLD AUTO: 0.4 % (ref 0–0.5)
LYMPHOCYTES # BLD AUTO: 0.25 10*3/MM3 (ref 0.7–3.1)
LYMPHOCYTES NFR BLD AUTO: 4.4 % (ref 19.6–45.3)
MCH RBC QN AUTO: 28.5 PG (ref 26.6–33)
MCHC RBC AUTO-ENTMCNC: 32.1 G/DL (ref 31.5–35.7)
MCV RBC AUTO: 88.7 FL (ref 79–97)
MONOCYTES # BLD AUTO: 0.29 10*3/MM3 (ref 0.1–0.9)
MONOCYTES NFR BLD AUTO: 5.1 % (ref 5–12)
NEUTROPHILS NFR BLD AUTO: 5.15 10*3/MM3 (ref 1.7–7)
NEUTROPHILS NFR BLD AUTO: 90.1 % (ref 42.7–76)
NRBC BLD AUTO-RTO: 0 /100 WBC (ref 0–0.2)
PLATELET # BLD AUTO: 249 10*3/MM3 (ref 140–450)
PMV BLD AUTO: 8.5 FL (ref 6–12)
POTASSIUM SERPL-SCNC: 4.7 MMOL/L (ref 3.5–5.2)
RBC # BLD AUTO: 3.79 10*6/MM3 (ref 3.77–5.28)
SODIUM SERPL-SCNC: 133 MMOL/L (ref 136–145)
WBC NRBC COR # BLD AUTO: 5.71 10*3/MM3 (ref 3.4–10.8)

## 2025-01-24 PROCEDURE — 25010000002 CEFAZOLIN PER 500 MG

## 2025-01-24 PROCEDURE — 85025 COMPLETE CBC W/AUTO DIFF WBC: CPT

## 2025-01-24 PROCEDURE — 80048 BASIC METABOLIC PNL TOTAL CA: CPT

## 2025-01-24 PROCEDURE — 97161 PT EVAL LOW COMPLEX 20 MIN: CPT

## 2025-01-24 PROCEDURE — 25010000002 ENOXAPARIN PER 10 MG

## 2025-01-24 PROCEDURE — 99024 POSTOP FOLLOW-UP VISIT: CPT | Performed by: NURSE PRACTITIONER

## 2025-01-24 PROCEDURE — 72040 X-RAY EXAM NECK SPINE 2-3 VW: CPT

## 2025-01-24 RX ORDER — CYCLOBENZAPRINE HCL 10 MG
10 TABLET ORAL 3 TIMES DAILY PRN
Qty: 45 TABLET | Refills: 0 | Status: SHIPPED | OUTPATIENT
Start: 2025-01-24

## 2025-01-24 RX ORDER — OXYCODONE HYDROCHLORIDE 5 MG/1
5 TABLET ORAL EVERY 4 HOURS PRN
Status: DISCONTINUED | OUTPATIENT
Start: 2025-01-24 | End: 2025-01-24 | Stop reason: HOSPADM

## 2025-01-24 RX ORDER — OXYCODONE AND ACETAMINOPHEN 5; 325 MG/1; MG/1
1 TABLET ORAL EVERY 6 HOURS PRN
Qty: 24 TABLET | Refills: 0 | Status: SHIPPED | OUTPATIENT
Start: 2025-01-24 | End: 2025-01-30

## 2025-01-24 RX ORDER — AMOXICILLIN 250 MG
2 CAPSULE ORAL 2 TIMES DAILY PRN
Qty: 20 TABLET | Refills: 0 | Status: SHIPPED | OUTPATIENT
Start: 2025-01-24

## 2025-01-24 RX ORDER — HYDROCODONE BITARTRATE AND ACETAMINOPHEN 5; 325 MG/1; MG/1
1 TABLET ORAL EVERY 6 HOURS PRN
Qty: 20 TABLET | Refills: 0 | Status: SHIPPED | OUTPATIENT
Start: 2025-01-24 | End: 2025-01-24 | Stop reason: ALTCHOICE

## 2025-01-24 RX ORDER — ASPIRIN 81 MG/1
81 TABLET ORAL DAILY
Start: 2025-01-30

## 2025-01-24 RX ORDER — TRAMADOL HYDROCHLORIDE 50 MG/1
50 TABLET ORAL EVERY 6 HOURS PRN
Qty: 24 TABLET | Refills: 0 | Status: SHIPPED | OUTPATIENT
Start: 2025-01-24 | End: 2025-01-24 | Stop reason: ALTCHOICE

## 2025-01-24 RX ADMIN — PANTOPRAZOLE SODIUM 40 MG: 40 TABLET, DELAYED RELEASE ORAL at 05:46

## 2025-01-24 RX ADMIN — TRAMADOL HYDROCHLORIDE 50 MG: 50 TABLET, COATED ORAL at 06:27

## 2025-01-24 RX ADMIN — ENOXAPARIN SODIUM 40 MG: 100 INJECTION SUBCUTANEOUS at 09:07

## 2025-01-24 RX ADMIN — CEFAZOLIN 2000 MG: 2 INJECTION, POWDER, FOR SOLUTION INTRAMUSCULAR; INTRAVENOUS at 04:58

## 2025-01-24 RX ADMIN — OXYCODONE 5 MG: 5 TABLET ORAL at 12:17

## 2025-01-24 RX ADMIN — TRAMADOL HYDROCHLORIDE 50 MG: 50 TABLET, COATED ORAL at 15:25

## 2025-01-24 RX ADMIN — HYDROCHLOROTHIAZIDE 12.5 MG: 12.5 TABLET ORAL at 09:06

## 2025-01-24 RX ADMIN — CYCLOBENZAPRINE HYDROCHLORIDE 10 MG: 10 TABLET, FILM COATED ORAL at 15:25

## 2025-01-24 NOTE — DISCHARGE SUMMARY
Mariajose Tabor  1950    Patient Care Team:  Lilia George MD as PCP - General (Family Medicine)  Osmar Kidd MD PhD as Consulting Physician (Hematology and Oncology)  Elroy Real MD as Consulting Physician (Hematology and Oncology)    Date of Admit: 1/23/2025    Date of Discharge:  1/24/2025    Discharge Diagnosis:  Cervical stenosis of spine      Procedures Performed  Procedure(s):  Cervical 3-6 anterior cervical discectomy and fusion       Complications: None    Consultants:   Consults       Date and Time Order Name Status Description    1/23/2025  3:28 PM Inpatient Hospitalist Consult Completed             Condition on Discharge: stable    Discharge disposition: home    HPI: Mariajose Tabor is a 74 y.o. female who presented with a history of neck pain and occipital headaches as well as symptoms most consistent with peripheral neuropathy who was found to have moderate to severe central stenosis on MRI of her cervical spine. Given the severity of the stenosis or potential for spinal cord injury or progressively worsening myelopathy decision was made to proceed with C3-6 ACDF for decompression of the spinal cord to prevent future injury. Patient understood the risks of surgery including bleeding infection CSF leak nerve damage spinal cord injury vascular injury stroke injury to neck structure including esophagus trachea recurrent laryngeal nerve permanent hoarseness and/or dysphagia hardware failure pseudoarthrosis need for future operation among many other risks and she agreed to undergo the procedure .     Hospital Course: Patient admitted for above procedure. The patient was transferred to [facility] following recovery.  The patient has recovered well postoperatively.  Patient has been seen postoperatively by Dr. Liu.  The patient reports significant resolution from their presurgical symptoms. The surgical Incision is well approximated with no obvious fluid collection or drainage seen.  The  patient's pain is well controlled.  Patient has been up ambulating. The patient does report mild soreness along the throat but no choking and is taking in adequate p.o. and urinating with no difficulties.    Post operative x rays have been reviewed with expected hardware positioning.  The  patient's vital signs have been stable, as has hemoglobin.  The patient is felt safe for discharge per the neurosurgical standpoint once evaluated with recommendations from physical therapy.  Discharge instructions were reviewed with the patient and a copy of instructions are attached to AVS.  Pt was explained that they will be discharged with pain medication, muscle relaxer and bowel regimen.   I encouraged utilization of ice packs to incisional area and very cold beverages help with postoperative swelling.  I did encourage patient to advance diet as tolerated and mainly stick to soft foods the first couple of days.  Tobacco products should be avoided.   I encouraged patient to call the office should they develop any fever, chills, incisional drainage, acute weakness or acute neck pain or other concern not mentioned.  If patient should develop any difficulty swallowing, report to the emergency room.  Patient voiced understanding.       Vitals:    01/24/25 0835   BP: 152/69   Pulse: 78   Resp: 14   Temp: 97.4 °F (36.3 °C)   SpO2: 99%         Lab Results (last 24 hours)       Procedure Component Value Units Date/Time    Basic Metabolic Panel [[id number]]  (Abnormal) Collected: 01/24/25 0103    Specimen: Blood from Arm, Right Updated: 01/24/25 0132     Glucose 162 mg/dL      BUN 16 mg/dL      Creatinine 0.72 mg/dL      Sodium 133 mmol/L      Potassium 4.7 mmol/L      Chloride 96 mmol/L      CO2 27.4 mmol/L      Calcium 8.9 mg/dL      BUN/Creatinine Ratio 22.2     Anion Gap 9.6 mmol/L      eGFR 89.0 mL/min/1.73     Narrative:      GFR Categories in Chronic Kidney Disease (CKD)      GFR Category          GFR (mL/min/1.73)     Interpretation  G1                     90 or greater         Normal or high (1)  G2                      60-89                Mild decrease (1)  G3a                   45-59                Mild to moderate decrease  G3b                   30-44                Moderate to severe decrease  G4                    15-29                Severe decrease  G5                    14 or less           Kidney failure          (1)In the absence of evidence of kidney disease, neither GFR category G1 or G2 fulfill the criteria for CKD.    eGFR calculation 2021 CKD-EPI creatinine equation, which does not include race as a factor    CBC & Differential [[id number]]  (Abnormal) Collected: 01/24/25 0103    Specimen: Blood from Arm, Right Updated: 01/24/25 0112    Narrative:      The following orders were created for panel order CBC & Differential.  Procedure                               Abnormality         Status                     ---------                               -----------         ------                     CBC Auto Differential[[id number]]        Abnormal            Final result                 Please view results for these tests on the individual orders.    CBC Auto Differential [[id number]]  (Abnormal) Collected: 01/24/25 0103    Specimen: Blood from Arm, Right Updated: 01/24/25 0112     WBC 5.71 10*3/mm3      RBC 3.79 10*6/mm3      Hemoglobin 10.8 g/dL      Hematocrit 33.6 %      MCV 88.7 fL      MCH 28.5 pg      MCHC 32.1 g/dL      RDW 14.5 %      RDW-SD 46.7 fl      MPV 8.5 fL      Platelets 249 10*3/mm3      Neutrophil % 90.1 %      Lymphocyte % 4.4 %      Monocyte % 5.1 %      Eosinophil % 0.0 %      Basophil % 0.0 %      Immature Grans % 0.4 %      Neutrophils, Absolute 5.15 10*3/mm3      Lymphocytes, Absolute 0.25 10*3/mm3      Monocytes, Absolute 0.29 10*3/mm3      Eosinophils, Absolute 0.00 10*3/mm3      Basophils, Absolute 0.00 10*3/mm3      Immature Grans, Absolute 0.02 10*3/mm3      nRBC 0.0 /100 WBC              Imaging Results (Last 24 Hours)       Procedure Component Value Units Date/Time    XR Spine Cervical 2 or 3 View [[id number]] Collected: 01/24/25 0936     Updated: 01/24/25 0939    Narrative:      XR SPINE CERVICAL 2 OR 3 VW    Date of Exam: 1/24/2025 9:25 AM EST    Indication: s/p cervical fusion    Comparison: None available.    Findings:  Discectomy with anterior fusion at C3/4, C4/5, and C5/6. There is moderate spondylosis at C6/7. Minimal facet arthropathy throughout. There is lower cervical prevertebral soft tissue swelling with gas in the soft tissues consistent with recent surgical   intervention.      Impression:      Impression:  Postoperative and degenerative changes as discussed      Electronically Signed: Jordan Frazier MD    1/24/2025 9:37 AM EST    Workstation ID: [id number]    FL C Arm During Surgery [[id number]] Resulted: 01/23/25 1422     Updated: 01/23/25 1422    Narrative:      This procedure was auto-finalized with no dictation required.    XR Spine Cervical 2 or 3 View [[id number]] Resulted: 01/23/25 1419     Updated: 01/23/25 1419    Narrative:      This procedure was auto-finalized with no dictation required.              Discharge Physical Exam:      General Appearance:    Alert, cooperative, in no acute distress   Head:    Normocephalic, without obvious abnormality, atraumatic   Neck:   Incision is well-approximated with no drainage or focal fluid collection noted.  Trachea is midline.   Abdomen:    nontender, nondistended   Extremities/MSK:   Moves all extremities well, no edema, no cyanosis, no            Redness    Skin:   No bleeding, bruising or rash   Neurologic:   Cranial nerves 2 - 12 grossly intact, sensation intact, DTR     present and equal bilaterally        Discharge Medications  Inspect has been reviewed and narcotic consent is on file in the patient's chart.     Your medication list        START taking these medications        Instructions Last Dose Given Next Dose Due    cyclobenzaprine 10 MG tablet  Commonly known as: FLEXERIL      Take 1 tablet by mouth 3 (Three) Times a Day As Needed for Muscle Spasms.       HYDROcodone-acetaminophen 5-325 MG per tablet  Commonly known as: NORCO      Take 1 tablet by mouth Every 6 (Six) Hours As Needed for Moderate Pain for up to 5 days.       Stool Softener Plus Laxative 8.6-50 MG per tablet  Generic drug: sennosides-docusate      Take 2 tablets by mouth 2 (Two) Times a Day As Needed for Constipation.              CHANGE how you take these medications        Instructions Last Dose Given Next Dose Due   aspirin 81 MG EC tablet  Start taking on: January 30, 2025  What changed: These instructions start on January 30, 2025. If you are unsure what to do until then, ask your doctor or other care provider.      Take 1 tablet by mouth Daily.              CONTINUE taking these medications        Instructions Last Dose Given Next Dose Due   alendronate 70 MG tablet  Commonly known as: FOSAMAX      TAKE 1 TABLET BY MOUTH ONCE A WEEK IN THE MORNING AT LEAST 30 MINUTES BEFORE FIRST FOOD, BEVERAGE OR MEDICATION OF THE DAY       benazepril 20 MG tablet  Commonly known as: LOTENSIN      Take 1 tablet by mouth Daily.       Biotin 10 MG capsule      Take  by mouth.       calcium carbonate 600 MG tablet  Commonly known as: OS-LILIANA      Take 1 tablet by mouth 2 (Two) Times a Day With Meals.       doxycycline 20 MG tablet  Commonly known as: PERIOSTAT      TAKE 1 TABLET BY MOUTH WITH BREAKFAST AND 1 ONCE DAILY WITH SUPPER       DULoxetine 60 MG capsule  Commonly known as: Cymbalta      Take 1 capsule by mouth Daily. For neuropathic pain       fish oil 1000 MG capsule capsule      Take 2 capsules by mouth Daily With Breakfast.       gabapentin 300 MG capsule  Commonly known as: NEURONTIN      Take 1 capsule by mouth 3 (Three) Times a Day.       hydroCHLOROthiazide 12.5 MG tablet      Take 1 tablet by mouth Daily.       NON FORMULARY / PATIENT SUPPLIED MEDICATION       Insert 2 mL into the vagina 2 (Two) Times a Week. Estriol 0.5% cream-   Insert 2ml twice weekly on Tuesdays and Fridays       omeprazole 40 MG capsule  Commonly known as: priLOSEC      Take 1 capsule by mouth Daily. before a meal       PRESERVISION AREDS 2 PO      Take 2 tablets by mouth Daily.       traZODone 150 MG tablet  Commonly known as: DESYREL      Take 2 tablets by mouth every night at bedtime.              STOP taking these medications      clobetasol propionate 0.05 % cream  Commonly known as: TEMOVATE        meloxicam 15 MG tablet  Commonly known as: MOBIC                  Where to Get Your Medications        These medications were sent to [facility] [address] [place] [address]      Hours: Monday to Friday 7 AM to 7 PM Phone: [phone]   cyclobenzaprine 10 MG tablet  HYDROcodone-acetaminophen 5-325 MG per tablet  Stool Softener Plus Laxative 8.6-50 MG per tablet       Information about where to get these medications is not yet available    Ask your nurse or doctor about these medications  aspirin 81 MG EC tablet         Discharge Diet: Soft diet and advance as tolerated      Activity at Discharge:   Activity Instructions       Discharge Activity      INSTRUCTION & CARE AFTER YOUR ANTERIOR CERVICAL FUSION    [facility]   [facility]  [address] [place] [address] [place]  [address]   P: [phone]  F: [phone]    Pedro Liu MD        No lifting anything heavier than gallon of milk    No driving for one week. We recommend that you limit riding in a car because of the risk of an accident. If you are a passenger, wear your seatbelt. In order to advance to driving, you will have to adjust your mirrors so that you are not turning your head and looking behind you.     You may bend over or reach over your head as long as you don't lift anything heavy.    Walk as much as possible.    Keep incision covered for at least a week.  If you notice any redness,  swelling or drainage, call the office.     You may shower 72 hours after surgery.  don't let the water beat directly on the incision. Gently pat the incision with mild soap and water, pat dry.     Schedule a 2 week follow up appointment.    You have a prescription for pain medication that will last you through to your 2 week follow up appointment. This prescription refill cannot be called in to the pharmacy. You must see us for your two week follow up appointment for a refill.    You may have some difficulty with swallowing. This is normal and may persist for up to 6 months post operatively. We recommend a soft to chew diet to include thickened liquids, cold ice water and easy to chew food. Avoid fried foods and chips. If you are unable to swallow your own secretions, please call the office.    Don't be alarmed if you experience some of your pre-operative symptoms after going home. This is not uncommon and normally goes away in a few days but may last longer. Pain, aching and stiffness in the neck, across the shoulders and between the shoulder blades are very common. If you have any questions or concerns don't hesitate to call the office.    Do NOT take any anti-inflammatory medications to include Ibuprofen, Aleve, Naproxen, Advil, Mobic, Celebrex    Constipation is common after surgery. Your bowels are slow due to anesthesia, opioid pain medications and lack of movement. We do not want you to strain to use the restroom. Use a mild stool softener or laxative as needed. Drink plenty of liquids to include water and juice.     Nausea is common with pain medications. To reduce this chance, do not take your medication on an empty stomach.            Call for: questions or concerns    Follow-up Appointments  Future Appointments   Date Time Provider Department Center   2/7/2025 11:00 AM Cynthia Goodman PA-C [facility]   3/6/2025 11:00 AM LAB [facility] LAB [facility]   3/13/2025 11:00 AM Elroy Real MD  [facility]   3/13/2025 11:00 AM VITALS ONLY ONC LAB NA [facility]      Follow-up Information       Lilia George MD .    Specialty: Family Medicine  Contact information:  [address] [facility] [place] [address] [phone]               Cynthia Goodman PA-C Follow up on 2/7/2025.    Specialty: Neurosurgery  Why: 11:00am  Contact information:  [address] [place] [address] [phone]                               Assessment and findings were discussed with Dr. Liu who agrees with plan of care.    Reyna Bryan, APRN  01/24/25  11:46 EST    30  min spent in reviewing records, discussion and examination of the patient and discussion with other members of the patient's medical team.

## 2025-01-24 NOTE — PLAN OF CARE
Goal Outcome Evaluation:      Spouse at bedside. PRN tramadol given. Dressing CDI. Possible d/c Friday.

## 2025-01-24 NOTE — CASE MANAGEMENT/SOCIAL WORK
Discharge Planning Assessment  [facility]     Patient Name: Mariajose Tabor  MRN: [id number]  Today's Date: 1/24/2025    Admit Date: 1/23/2025    Plan: DC PLAN: Routine home       Discharge Needs Assessment       Row Name 01/24/25 1143       Living Environment    People in Home spouse    Current Living Arrangements home    Potentially Unsafe Housing Conditions none    In the past 12 months has the electric, gas, oil, or water company threatened to shut off services in your home? No    Primary Care Provided by self    Provides Primary Care For no one    Family Caregiver if Needed spouse    Quality of Family Relationships helpful;involved;supportive    Able to Return to Prior Arrangements yes       Resource/Environmental Concerns    Resource/Environmental Concerns none    Transportation Concerns none       Transportation Needs    In the past 12 months, has lack of transportation kept you from medical appointments or from getting medications? no    In the past 12 months, has lack of transportation kept you from meetings, work, or from getting things needed for daily living? No       Food Insecurity    Within the past 12 months, you worried that your food would run out before you got the money to buy more. Never true    Within the past 12 months, the food you bought just didn't last and you didn't have money to get more. Never true       Transition Planning    Patient/Family Anticipates Transition to home with family    Patient/Family Anticipated Services at Transition none    Transportation Anticipated car, drives self;family or friend will provide       Discharge Needs Assessment    Readmission Within the Last 30 Days no previous admission in last 30 days    Equipment Currently Used at Home none    Anticipated Changes Related to Illness none    Equipment Needed After Discharge none                   Discharge Plan       Row Name 01/24/25 1143       Plan    Plan DC PLAN: Routine home      Patient/Family in Agreement with  Plan yes    Plan Comments CM met with patient at bedside, from routine home with . Independent with ADL's no DME. Has a walker available, does not have to use. PCP is Doris, Pharmacy is [facility]. Able to afford medications, denies any issues with food or utilities. Denies any issues with transportation.  will provide. Denies any HHC or SNF needs. Denies any concerns about return home.                    Continued Care and Services - Admitted Since 1/23/2025    No active coordination exists for this encounter.       Expected Discharge Date and Time       Expected Discharge Date Expected Discharge Time    Jan 24, 2025            Demographic Summary       Row Name 01/24/25 1142       General Information    Admission Type inpatient    Arrived From emergency department    Required Notices Provided Important Message from Medicare    Referral Source admission list    Reason for Consult discharge planning    Preferred Language English       Contact Information    Permission Granted to Share Info With     Contact Information Obtained for                    Functional Status       Row Name 01/24/25 1143       Functional Status    Usual Activity Tolerance excellent    Current Activity Tolerance excellent       Physical Activity    On average, how many days per week do you engage in moderate to strenuous exercise (like a brisk walk)? 0 days    On average, how many minutes do you engage in exercise at this level? 0 min    Number of minutes of exercise per week 0       Assessment of Health Literacy    How often do you have someone help you read hospital materials? Sometimes    How often do you have problems learning about your medical condition because of difficulty understanding written information? Sometimes    How often do you have a problem understanding what is told to you about your medical condition? Sometimes    How confident are you filling out medical forms by yourself? Somewhat     Health Literacy Moderate       Functional Status, IADL    Medications independent    Meal Preparation independent    Housekeeping independent    Laundry independent    Shopping independent       Mental Status    General Appearance WDL WDL       Mental Status Summary    Recent Changes in Mental Status/Cognitive Functioning no changes      Row Name 01/24/25 1142       Functional Status    Usual Activity Tolerance excellent    Current Activity Tolerance excellent       Physical Activity    On average, how many days per week do you engage in moderate to strenuous exercise (like a brisk walk)? 0 days    On average, how many minutes do you engage in exercise at this level? 0 min    Number of minutes of exercise per week 0       Assessment of Health Literacy    How often do you have someone help you read hospital materials? Sometimes    How often do you have problems learning about your medical condition because of difficulty understanding written information? Sometimes    How often do you have a problem understanding what is told to you about your medical condition? Sometimes    How confident are you filling out medical forms by yourself? Somewhat    Health Literacy Moderate       Functional Status, IADL    Medications independent    Meal Preparation independent    Housekeeping independent    Laundry independent    Shopping independent       Mental Status    General Appearance WDL WDL       Mental Status Summary    Recent Changes in Mental Status/Cognitive Functioning no changes                         Met with patient in room wearing PPE:     Maintained distance greater than six feet and spent less than 15 minutes in the room.    Jackie Lopez RN   Case Management  [phone]

## 2025-01-24 NOTE — PLAN OF CARE
Goal Outcome Evaluation:  Plan of Care Reviewed With: patient           Outcome Evaluation: Mariajose Tabor is a 74 y.o. female with chronic neck pain as well as pain that will radiate into her occipital region causing headaches.   Also with lower extremity burning tightness spasms and numbness. Now s/p C3-C6 ACDF by Dr. Liu 1/23/25. Pt lives with supportive spouse in a [facility] with 3 CIRO with no rail. She has a RW. She presents with post op pain in neck and B shoulders/upper back. She uses RW for support in standing and completes transfers and gait trials with CGA/SBA. Vaishali for stairs due to trial without HR (to simulate home set up). Pt instructed in post op care considerations and lifting restrictions. Pt is safe to DC home with spouse. No additional acute care skilled PT needs at this time. PT will sign off and complete order.    Anticipated Discharge Disposition (PT): home with assist

## 2025-01-24 NOTE — PROGRESS NOTES
[facility] MEDICINE SERVICE  DAILY PROGRESS NOTE    NAME: Mariajose Tabor  : 1950  MRN: [id number]      LOS: 1 day     PROVIDER OF SERVICE: Kathryn Keyes MD    Chief Complaint: Cervical stenosis of spine    Subjective:     Interval History:  History taken from: Patient and patient's chart     Pain controlled.  Denies any new complaint.        Review of Systems:   Review of Systems  All negative except above  Objective:     Vital Signs  Temp:  [97.4 °F (36.3 °C)-98.2 °F (36.8 °C)] 97.4 °F (36.3 °C)  Heart Rate:  [61-93] 78  Resp:  [10-15] 14  BP: (122-173)/(50-75) 152/69  Flow (L/min) (Oxygen Therapy):  [2-6] 2   Body mass index is 22.43 kg/m².    Physical Exam  Physical Exam  General: Alert and oriented, no acute distress.  HENT: Normocephalic, moist oral mucosa, no scleral icterus.  Neck: Clean/Dry/intact dressing in place on anterior neck  Lungs: Clear to auscultation, nonlabored respiration.  Heart: RRR, no murmur, gallop or edema.  Abdomen: Soft, nontender, nondistended, + bowel sounds.  Musculoskeletal: Normal range of motion and strength, no tenderness or swelling.  Neuro: alert and awake, moving all 4 extremities         Diagnostic Data    Results from last 7 days   Lab Units 25  0103   WBC 10*3/mm3 5.71   HEMOGLOBIN g/dL 10.8*   HEMATOCRIT % 33.6*   PLATELETS 10*3/mm3 249   GLUCOSE mg/dL 162*   CREATININE mg/dL 0.72   BUN mg/dL 16   SODIUM mmol/L 133*   POTASSIUM mmol/L 4.7   ANION GAP mmol/L 9.6       XR Spine Cervical 2 or 3 View    Result Date: 2025  Impression: Postoperative and degenerative changes as discussed Electronically Signed: Jordan Frazier MD  2025 9:37 AM EST  Workstation ID: [id number]       I have reviewed patient labs and imaging     Assessment/Plan:   74-year-old patient vertigo hypertension neck pain is admitted in surgical unit for the elective surgery cervical 3-6 anterior cervical adnexectomy and fusion by Dr. Liu.  Patient blood pressure is  mildly elevated given her pain from surgery  site.         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             Disposition Planning:     Barriers to Discharge: Neurosurgery clearance  Anticipated Date of Discharge: 1 to 2 days  Place of Discharge: Home versus rehab      Time: 40 minutes     There are no questions and answers to display.       Signature: Electronically signed by Kathryn Keyes MD, 01/24/25, 10:53 EST.  [facility] Hospitalist Team

## 2025-01-24 NOTE — THERAPY EVALUATION
Patient Name: Mariajose Tabor  : 1950    MRN: [id number]                              Today's Date: 2025       Admit Date: 2025    Visit Dx:     ICD-10-CM ICD-9-CM   1. S/P cervical spinal fusion  Z98.1 V45.4   2. Cervical stenosis of spine  M48.02 723.0     Patient Active Problem List   Diagnosis    Generalized abdominal pain    Benign positional vertigo    Hypertension    Umbilical hernia without obstruction or gangrene    Perforated viscus    Abdominal pain    Tingling pain    Weakness    Pain    Low back pain    Degenerative disc disease, cervical    Former cigarette smoker    Cervical stenosis of spine     Past Medical History:   Diagnosis Date    GERD (gastroesophageal reflux disease)     Headache, tension-type     Hypertension     Migraine     Muscle weakness (generalized) 2023    Pain 2023    in right leg    Pain in left leg 2023    Paresthesia of skin 2023    Peripheral neuropathy 3-     Past Surgical History:   Procedure Laterality Date    BASAL CELL CARCINOMA EXCISION  04/10/2024    CARPAL TUNNEL RELEASE      CATARACT EXTRACTION      COLON RESECTION      COLONOSCOPY      DILATATION AND CURETTAGE      ENDOSCOPY N/A 2021    Procedure: ESOPHAGOGASTRODUODENOSCOPY with gastric biopsy x 2;  Surgeon: Ana Muñiz MD;  Location: [facility] ENDOSCOPY;  Service: Gastroenterology;  Laterality: N/A;  gastric nodule,  hiatal hernia    EXPLORATORY LAPAROTOMY N/A 2021    Procedure: LAPAROTOMY EXPLORATORY with RIGHT COLON RESECTION;  Surgeon: Pedro Wren DO;  Location: [facility] MAIN OR;  Service: General;  Laterality: N/A;    HERNIA REPAIR      THUMB ARTHROSCOPY Bilateral     thumb fusion      General Information       Row Name 25 1608          Physical Therapy Time and Intention    Document Type evaluation  -     Mode of Treatment individual therapy  -       Row Name 25 1608          General Information    Patient Profile Reviewed yes   -     Prior Level of Function independent:;all household mobility;community mobility;ADL's  -     Existing Precautions/Restrictions fall;spinal  -       Row Name 01/24/25 1608          Living Environment    People in Home spouse  -[facility] Name 01/24/25 1608          Home Main Entrance    Number of Stairs, Main Entrance three  -     Stair Railings, Main Entrance none  -       Row Name 01/24/25 1608          Cognition    Orientation Status (Cognition) oriented x 4  -[facility] Name 01/24/25 1608          Safety Issues/Impairments Affecting Functional Mobility    Impairments Affecting Function (Mobility) pain  -               User Key  (r) = Recorded By, (t) = Taken By, (c) = Cosigned By      Initials Name Provider Type     Heather Ang, PT Physical Therapist                   Mobility       Row Name 01/24/25 1611          Bed Mobility    Bed Mobility bed mobility (all) activities  -     All Activities, [place] (Bed Mobility) supervision  -     Comment, (Bed Mobility) Cues for technique. supine to/from sit. completed with HOB flat and with HOB elevated (due to pt having an adjustable bed frame at home)  -[facility] Name 01/24/25 1611          Bed-Chair Transfer    Bed-Chair [place] (Transfers) supervision  -     Assistive Device (Bed-Chair Transfers) walker, front-wheeled  -[facility] Name 01/24/25 1611          Sit-Stand Transfer    Sit-Stand [place] (Transfers) supervision  -     Assistive Device (Sit-Stand Transfers) walker, front-wheeled  -[facility] Name 01/24/25 1611          Gait/Stairs (Locomotion)    [place] Level (Gait) supervision  -     Assistive Device (Gait) walker, front-wheeled  -     Patient was able to Ambulate yes  -     Deviations/Abnormal Patterns (Gait) jonathan decreased;stride length decreased  -     [place] Level (Stairs) contact guard;minimum assist (75% patient effort)  -     Number of Steps (Stairs) 4 completed with A  due to pt not having a rail at home.  -     Ascending Technique (Stairs) step-to-step  -     Descending Technique (Stairs) step-to-step  -               User Key  (r) = Recorded By, (t) = Taken By, (c) = Cosigned By      Initials Name Provider Type     Heather Ang, PT Physical Therapist                   Obj/Interventions       [facility] Name 01/24/25 1615          Range of Motion Comprehensive    General Range of Motion bilateral lower extremity ROM WFL  -AH       Row Name 01/24/25 1615          Strength Comprehensive (MMT)    Comment, General Manual Muscle Testing (MMT) Assessment [facility] WFL. BUE appear grossly WFL per limited assessment (to avoid Cspine strain).  -[facility] Name 01/24/25 1615          Motor Skills    Motor Skills functional endurance  -     Functional Endurance FAIR +  -[facility] Name 01/24/25 1615          Balance    Balance Assessment sitting static balance;sitting dynamic balance;standing static balance;standing dynamic balance  -     Static Sitting Balance standby assist  -     Dynamic Sitting Balance standby assist  -     Position, Sitting Balance supported  -     Static Standing Balance contact guard  -     Dynamic Standing Balance contact guard  -     Position/Device Used, Standing Balance walker, front-wheeled  -[facility] Name 01/24/25 1615          Sensory Assessment (Somatosensory)    Sensory Assessment (Somatosensory) --  chronic abnormal sensation in [facility] pt reports is unchanged post op.  -               User Key  (r) = Recorded By, (t) = Taken By, (c) = Cosigned By      Initials Name Provider Type     Heather Ang, PT Physical Therapist                   Goals/Plan    No documentation.                  Clinical Impression       [facility] Name 01/24/25 1617          Pain    Pretreatment Pain Rating 5/10  -     Posttreatment Pain Rating 8/10  -     Pain Location neck  -     Pain Management Interventions activity modification encouraged  PT adjusted Ccollar  for improved fit - instructed to use for comfort.  -     Response to Pain Interventions activity participation with increased pain  -       Row Name 01/24/25 1617          Plan of Care Review    Plan of Care Reviewed With patient  -     Outcome Evaluation Mariajose Tabor is a 74 y.o. female with chronic neck pain as well as pain that will radiate into her occipital region causing headaches.   Also with lower extremity burning tightness spasms and numbness. Now s/p C3-C6 ACDF by Dr. Liu 1/23/25. Pt lives with supportive spouse in a [facility] with 3 CIRO with no rail. She has a RW. She presents with post op pain in neck and B shoulders/upper back. She uses RW for support in standing and completes transfers and gait trials with CGA/SBA. Vaishali for stairs due to trial without HR (to simulate home set up). Pt instructed in post op care considerations and lifting restrictions. Pt is safe to DC home with spouse. No additional acute care skilled PT needs at this time. PT will sign off and complete order.  -       Row Name 01/24/25 1617          Therapy Assessment/Plan (PT)    Patient/Family Therapy Goals Statement (PT) improved pain and go home.  -     Criteria for Skilled Interventions Met (PT) no;no problems identified which require skilled intervention  -     Therapy Frequency (PT) evaluation only  -       Row Name 01/24/25 1617          Positioning and Restraints    Post Treatment Position bed  -     In Bed fowlers;notified nsg;call light within reach;encouraged to call for assist;with family/caregiver  -               User Key  (r) = Recorded By, (t) = Taken By, (c) = Cosigned By      Initials Name Provider Type     Heather Ang, PT Physical Therapist                   Outcome Measures       Row Name 01/24/25 1629 01/24/25 0830       How much help from another person do you currently need...    Turning from your back to your side while in flat bed without using bedrails? 3  - 3  -EY    Moving from  lying on back to sitting on the side of a flat bed without bedrails? 3  - 3  -EY    Moving to and from a bed to a chair (including a wheelchair)? 3  - 3  -EY    Standing up from a chair using your arms (e.g., wheelchair, bedside chair)? 3  - 3  -EY    Climbing 3-5 steps with a railing? 3  - 3  -EY    To walk in hospital room? 3  - 3  -EY    AM-[facility] 6 Clicks Score (PT) 18  - 18  -EY    Highest Level of Mobility Goal 6 --> Walk 10 steps or more  - 6 --> Walk 10 steps or more  -      Row Name 01/24/25 1629          Functional Assessment    Outcome Measure Options AM-PAC 6 Clicks Basic Mobility (PT)  -               User Key  (r) = Recorded By, (t) = Taken By, (c) = Cosigned By      Initials Name Provider Type     Lissette Jordan, RN Registered Nurse     Heather Ang, PT Physical Therapist                                 Physical Therapy Education       Title: PT OT SLP Therapies (Done)       Topic: Physical Therapy (Done)       Point: Mobility training (Done)       Learning Progress Summary            Patient Acceptance, E,D, VU,DU by  at 1/24/2025 1629   Significant Other Acceptance, E,D, VU,DU by  at 1/24/2025 1629                      Point: Home exercise program (Done)       Learning Progress Summary            Patient Acceptance, E,D, VU,DU by  at 1/24/2025 1629   Significant Other Acceptance, E,D, VU,DU by  at 1/24/2025 1629                      Point: Body mechanics (Done)       Learning Progress Summary            Patient Acceptance, E,D, VU,DU by  at 1/24/2025 1629   Significant Other Acceptance, E,D, VU,DU by  at 1/24/2025 1629                      Point: Precautions (Done)       Learning Progress Summary            Patient Acceptance, E,D, VU,DU by  at 1/24/2025 1629   Significant Other Acceptance, E,D, VU,DU by  at 1/24/2025 1629                                      User Key       Initials Effective Dates Name Provider Type [facility] 12/04/23 -  Heather Ang, PT  Physical Therapist PT                  PT Recommendation and Plan     Outcome Evaluation: Mariajose Tabor is a 74 y.o. female with chronic neck pain as well as pain that will radiate into her occipital region causing headaches.   Also with lower extremity burning tightness spasms and numbness. Now s/p C3-C6 ACDF by Dr. Liu 1/23/25. Pt lives with supportive spouse in a [facility] with 3 CIRO with no rail. She has a RW. She presents with post op pain in neck and B shoulders/upper back. She uses RW for support in standing and completes transfers and gait trials with CGA/SBA. Vaishali for stairs due to trial without HR (to simulate home set up). Pt instructed in post op care considerations and lifting restrictions. Pt is safe to DC home with spouse. No additional acute care skilled PT needs at this time. PT will sign off and complete order.     Time Calculation:         PT Charges       Row Name 01/24/25 1630             Time Calculation    Start Time 1126  -      Stop Time 1211  -      Time Calculation (min) 45 min  -      PT Non-Billable Time (min) 0 min  -      PT Received On 01/24/25  -         Time Calculation- PT    Total Timed Code Minutes- PT 0 minute(s)  -                User Key  (r) = Recorded By, (t) = Taken By, (c) = Cosigned By      Initials Name Provider Type     Heather Ang PT Physical Therapist                  Therapy Charges for Today       Code Description Service Date Service Provider Modifiers Qty    [id number] HC PT EVAL LOW COMPLEXITY 5 1/24/2025 Heather Ang, PT  1            PT G-Codes  Outcome Measure Options: AM-PAC 6 Clicks Basic Mobility (PT)  AM-PAC 6 Clicks Score (PT): 18  PT Discharge Summary  Anticipated Discharge Disposition (PT): home with assist    Heather Ang PT  1/24/2025

## 2025-01-24 NOTE — PLAN OF CARE
Goal Outcome Evaluation:      Patient doing well,  at bedside, will dc home later this afternoon, care plan ongoing                       Please fax labs from today and per op clearance to surgeon, thanks! Jessy Muñoz MD MPH

## 2025-01-27 NOTE — CASE MANAGEMENT/SOCIAL WORK
Case Management Discharge Note      Final Note: Routine home         Selected Continued Care - Discharged on 1/24/2025 Admission date: 1/23/2025 - Discharge disposition: Home or Self Care             Transportation Services  Private: Car    Final Discharge Disposition Code: 01 - home or self-care

## 2025-01-28 NOTE — PROGRESS NOTES
Subjective     Chief Complaint   Patient presents with    Post-op         Previous Treatment: Cervical 3-6 anterior cervical discectomy and fusion 1/23/25    HPI: Mariajose Tabor is a 74 y.o. female who presents to [facility] for her 2-week postoperative appointment for her C3-6 ACDF.  Patient states that overall she is doing very well and has no new complaints at this time.  Patient states that she has no cervical radiculopathy, no difficulty speaking or swallowing at this time.        PMH:  Past Medical History:   Diagnosis Date    Arthritis     Cervical disc disorder     GERD (gastroesophageal reflux disease)     Headache, tension-type     Hypertension     Low back pain     Lumbosacral disc disease     Migraine     Muscle weakness (generalized) 09/21/2023    Pain 09/21/2023    Pain in left leg 09/21/2023    Paresthesia of skin 09/21/2023    Peripheral neuropathy 3-2023         Current Outpatient Medications:     alendronate (FOSAMAX) 70 MG tablet, TAKE 1 TABLET BY MOUTH ONCE A WEEK IN THE MORNING AT LEAST 30 MINUTES BEFORE FIRST FOOD, BEVERAGE OR MEDICATION OF THE DAY, Disp: , Rfl:     aspirin 81 MG EC tablet, Take 1 tablet by mouth Daily., Disp: , Rfl:     benazepril (LOTENSIN) 20 MG tablet, Take 1 tablet by mouth Daily., Disp: , Rfl:     Biotin 10 MG capsule, Take  by mouth., Disp: , Rfl:     calcium carbonate (OS-LILIANA) 600 MG tablet, Take 1 tablet by mouth 2 (Two) Times a Day With Meals., Disp: , Rfl:     cyclobenzaprine (FLEXERIL) 10 MG tablet, Take 1 tablet by mouth 3 (Three) Times a Day As Needed for Muscle Spasms., Disp: 45 tablet, Rfl: 0    doxycycline (PERIOSTAT) 20 MG tablet, TAKE 1 TABLET BY MOUTH WITH BREAKFAST AND 1 ONCE DAILY WITH SUPPER, Disp: , Rfl:     DULoxetine (Cymbalta) 60 MG capsule, Take 1 capsule by mouth Daily. For neuropathic pain, Disp: 30 capsule, Rfl: 3    gabapentin (NEURONTIN) 300 MG capsule, Take 1 capsule by mouth 3 (Three) Times a Day., Disp: , Rfl:     hydroCHLOROthiazide  (HYDRODIURIL) 12.5 MG tablet, Take 1 tablet by mouth Daily., Disp: , Rfl:     Multiple Vitamins-Minerals (PRESERVISION AREDS 2 PO), Take 2 tablets by mouth Daily., Disp: , Rfl:     Omega-3 Fatty Acids (fish oil) 1000 MG capsule capsule, Take 2 capsules by mouth Daily With Breakfast., Disp: , Rfl:     omeprazole (priLOSEC) 40 MG capsule, Take 1 capsule by mouth Daily. before a meal, Disp: , Rfl:     oxyCODONE-acetaminophen (Percocet) 5-325 MG per tablet, Take 1 tablet by mouth Every 6 (Six) Hours As Needed for Severe Pain., Disp: 24 tablet, Rfl: 0    traZODone (DESYREL) 150 MG tablet, Take 2 tablets by mouth every night at bedtime., Disp: , Rfl:     naloxone (NARCAN) 4 MG/0.1ML nasal spray, Call 911. Don't prime. Imperial in 1 nostril for overdose. Repeat in 2-3 minutes in other nostril if no or minimal breathing/responsiveness., Disp: 2 each, Rfl: 0    NON FORMULARY / PATIENT SUPPLIED MEDICATION, Insert 2 mL into the vagina 2 (Two) Times a Week. Estriol 0.5% cream-   Insert 2ml twice weekly on Tuesdays and Fridays, Disp: , Rfl:     sennosides-docusate (PERICOLACE) 8.6-50 MG per tablet, Take 2 tablets by mouth 2 (Two) Times a Day As Needed for Constipation., Disp: 20 tablet, Rfl: 0     Allergies   Allergen Reactions    Hydroxychloroquine Sulfate Other (See Comments)     Build up on cornea; affected night vision        Past Surgical History:   Procedure Laterality Date    ANTERIOR CERVICAL DISCECTOMY W/ FUSION N/A 01/23/2025    Procedure: Cervical 3-6 anterior cervical discectomy and fusion;  Surgeon: Pedro Liu IV, MD;  Location: [facility] MAIN OR;  Service: Neurosurgery;  Laterality: N/A;    BASAL CELL CARCINOMA EXCISION  04/10/2024    CARPAL TUNNEL RELEASE      CATARACT EXTRACTION      COLON RESECTION  2021    COLONOSCOPY      DILATATION AND CURETTAGE      ENDOSCOPY N/A 07/07/2021    Procedure: ESOPHAGOGASTRODUODENOSCOPY with gastric biopsy x 2;  Surgeon: Ana Muñiz MD;  Location: [facility] ENDOSCOPY;  Service:  "Gastroenterology;  Laterality: N/A;  gastric nodule,  hiatal hernia    EPIDURAL BLOCK      For neck    EXPLORATORY LAPAROTOMY N/A 2021    Procedure: LAPAROTOMY EXPLORATORY with RIGHT COLON RESECTION;  Surgeon: Pedro Wren DO;  Location: [facility] MAIN OR;  Service: General;  Laterality: N/A;    HERNIA REPAIR      THUMB ARTHROSCOPY Bilateral     thumb fusion        Family History   Problem Relation Age of Onset    Heart disease Mother     Arthritis Mother     Hyperlipidemia Mother     Stomach cancer Father     Cancer Father     Kidney disease Father     Heart disease Brother          Social Hx:  Social History     Tobacco Use   Smoking Status Former    Current packs/day: 0.00    Average packs/day: 0.3 packs/day for 19.2 years (5.8 ttl pk-yrs)    Types: Cigarettes    Start date:     Quit date: 3/22/2011    Years since quittin.8    Passive exposure: Past   Smokeless Tobacco Never   Tobacco Comments    Social smoker, quit 11 years ago      Alcohol Use: Not At Risk (2025)    AUDIT-C     Frequency of Alcohol Consumption: 4 or more times a week     Average Number of Drinks: 1 or 2     Frequency of Binge Drinking: Never      Social History     Substance and Sexual Activity   Drug Use Never    Types: Marijuana          Review of Systems   Constitutional:  Positive for activity change.   HENT: Negative.     Eyes: Negative.    Respiratory: Negative.     Cardiovascular: Negative.    Gastrointestinal: Negative.    Endocrine: Negative.    Genitourinary: Negative.    Musculoskeletal:  Positive for arthralgias, myalgias, neck pain and neck stiffness.   Skin:         Looks good   Allergic/Immunologic: Negative.    Neurological:         Right shoulder ache    Hematological: Negative.    Psychiatric/Behavioral: Negative.           Objective     /76   Pulse 83   Resp 18   Ht 160 cm (63\")   Wt 57.6 kg (127 lb)   SpO2 98%   BMI 22.50 kg/m²    Body mass index is 22.5 kg/m².      Physical Exam  Vitals " reviewed.   Eyes:      Extraocular Movements: Extraocular movements intact.      Conjunctiva/sclera: Conjunctivae normal.      Pupils: Pupils are equal, round, and reactive to light.   Musculoskeletal:         General: Normal range of motion.      Cervical back: Normal range of motion.   Skin:     General: Skin is warm and dry.   Neurological:      General: No focal deficit present.   Psychiatric:         Mood and Affect: Mood normal.         Speech: Speech normal.          Neurological Exam  Mental Status  Awake, alert and oriented to person, place and time. Oriented to person, place and time. Speech is normal. Language is fluent with no aphasia.    Cranial Nerves  CN III, IV, VI: Extraocular movements intact bilaterally. Pupils equal round and reactive to light bilaterally.    Motor  Decreased muscle bulk throughout.                                               Right                     Left  Rhomboids                            5                          5  Infraspinatus                          5                          5  Supraspinatus                       5                          5  Deltoid                                   5                          5   Biceps                                   5                          5  Brachioradialis                      5                          5   Triceps                                  5                          5   Wrist flexor                            5                          5   Wrist extensor                       5                          5    Sensory  Sensation is intact to light touch, pinprick, vibration and proprioception in all four extremities.    Reflexes    Right pathological reflexes: Yamilet's absent.  Left pathological reflexes: Yamilet's absent.    Gait    Patient walks without difficulty.          Results Review  I personally reviewed and interpreted the images from the following studies:          XR Spine Cervical 2 or 3  View    Result Date: 1/24/2025  XR SPINE CERVICAL 2 OR 3 VW Date of Exam: 1/24/2025 9:25 AM EST Indication: s/p cervical fusion Comparison: None available. Findings: Discectomy with anterior fusion at C3/4, C4/5, and C5/6. There is moderate spondylosis at C6/7. Minimal facet arthropathy throughout. There is lower cervical prevertebral soft tissue swelling with gas in the soft tissues consistent with recent surgical intervention.     Impression: Impression: Postoperative and degenerative changes as discussed Electronically Signed: Jordan Frazier MD  1/24/2025 9:37 AM EST  Workstation ID: [id number]    XR Spine Cervical 2 or 3 View    Result Date: 1/23/2025  This procedure was auto-finalized with no dictation required.       Assessment & Plan     MDM: Mariajose Tabor is a 74 y.o. female who presents to clinic for her 2-week follow-up.  Patient states overall she is doing very well and reports no new symptoms at this time.    Patient is to start physical therapy and is to obtain a CT before her 3-month follow-up.  It is essential that the patient obtains a CT to ensure that her hardware is intact and has no complications.  Patient also has a bone stimulator.    Patient denies any fever, chills or discharge since surgery.  Her incision is healing very nicely.  At this time she is able to get her incision wet but should not submerge her incision at this time.  Patient should continue to be cognizant of twisting and turning her neck.  She is not to lift more than 10 pounds at this time.    On physical exam patient has great strength in her upper extremities, full sensation and I did not appreciate a Yamilet sign.    Should patient start to experience any new or worsening symptoms she is to call the office right away.  He did not request any medication refills at this time.  She actually stated that she has stopped taking her pain medication.    Patient is agreeable to the above plan and knows to call with any questions or  concerns.       Diagnosis Plan   1. S/P cervical spinal fusion  Ambulatory Referral to Physical Therapy for Evaluation & Treatment    CT Cervical Spine Without Contrast          Return 3-month follow-up, for Next scheduled follow up.      Mariajose Tabor  reports that she quit smoking about 13 years ago. Her smoking use included cigarettes. She started smoking about 33 years ago. She has a 5.8 pack-year smoking history. She has been exposed to tobacco smoke. She has never used smokeless tobacco.          BMI is within normal parameters. No other follow-up for BMI required.         This patient was examined wearing appropriate personal protective equipment.            Cynthia Goodman PA-C    02/07/25  11:24 EST      Part of this note may be an electronic transcription/translation of spoken language to printed text using the Dragon Dictation System.

## 2025-01-30 DIAGNOSIS — Z98.1 S/P CERVICAL SPINAL FUSION: Primary | ICD-10-CM

## 2025-01-30 RX ORDER — OXYCODONE AND ACETAMINOPHEN 5; 325 MG/1; MG/1
1 TABLET ORAL EVERY 6 HOURS PRN
Qty: 24 TABLET | Refills: 0 | Status: CANCELLED | OUTPATIENT
Start: 2025-01-30

## 2025-01-30 RX ORDER — OXYCODONE AND ACETAMINOPHEN 5; 325 MG/1; MG/1
1 TABLET ORAL EVERY 6 HOURS PRN
Qty: 24 TABLET | Refills: 0 | Status: SHIPPED | OUTPATIENT
Start: 2025-01-30

## 2025-01-30 NOTE — TELEPHONE ENCOUNTER
Patient called stating her collar is hurting her shoulders and causing her spasms to be pretty bad. She is wanting to know what she can do. She read off the box and it is a stimulator and I let her know she is to only wear it max 4 hours out of the day. But I let her know I would have yosef contact her. She stated she understood.

## 2025-02-07 ENCOUNTER — OFFICE VISIT (OUTPATIENT)
Dept: NEUROSURGERY | Facility: CLINIC | Age: 75
End: 2025-02-07
Payer: MEDICARE

## 2025-02-07 VITALS
OXYGEN SATURATION: 98 % | HEART RATE: 83 BPM | WEIGHT: 127 LBS | HEIGHT: 63 IN | BODY MASS INDEX: 22.5 KG/M2 | DIASTOLIC BLOOD PRESSURE: 76 MMHG | RESPIRATION RATE: 18 BRPM | SYSTOLIC BLOOD PRESSURE: 135 MMHG

## 2025-02-07 DIAGNOSIS — M79.2 NEUROPATHIC PAIN: ICD-10-CM

## 2025-02-07 DIAGNOSIS — Z98.1 S/P CERVICAL SPINAL FUSION: Primary | ICD-10-CM

## 2025-02-07 PROCEDURE — 99024 POSTOP FOLLOW-UP VISIT: CPT

## 2025-02-07 RX ORDER — CYCLOBENZAPRINE HCL 10 MG
10 TABLET ORAL 3 TIMES DAILY PRN
Qty: 45 TABLET | Refills: 0 | Status: SHIPPED | OUTPATIENT
Start: 2025-02-07

## 2025-02-07 RX ORDER — DULOXETIN HYDROCHLORIDE 60 MG/1
60 CAPSULE, DELAYED RELEASE ORAL DAILY
Qty: 30 CAPSULE | Refills: 0 | Status: SHIPPED | OUTPATIENT
Start: 2025-02-07

## 2025-02-10 ENCOUNTER — TREATMENT (OUTPATIENT)
Dept: PHYSICAL THERAPY | Facility: CLINIC | Age: 75
End: 2025-02-10
Payer: MEDICARE

## 2025-02-10 DIAGNOSIS — Z98.1 HX OF FUSION OF CERVICAL SPINE: ICD-10-CM

## 2025-02-10 DIAGNOSIS — M54.2 PAIN, NECK: Primary | ICD-10-CM

## 2025-02-10 PROCEDURE — 97112 NEUROMUSCULAR REEDUCATION: CPT | Performed by: PHYSICAL THERAPIST

## 2025-02-10 PROCEDURE — 97161 PT EVAL LOW COMPLEX 20 MIN: CPT | Performed by: PHYSICAL THERAPIST

## 2025-02-10 PROCEDURE — 97110 THERAPEUTIC EXERCISES: CPT | Performed by: PHYSICAL THERAPIST

## 2025-02-10 NOTE — PROGRESS NOTES
Physical Therapy Initial Evaluation and Plan of Care  [address] [place] [address]     Patient: Mariajose Tabor   : 1950  Diagnosis/ICD-10 Code:  Pain, neck [M54.2]  Referring practitioner: Cynthia Goodman PA-C  Date of Initial Visit: 2/10/2025  Today's Date: 2/10/2025  Patient seen for 1 session         Visit Diagnoses:    ICD-10-CM ICD-9-CM   1. Pain, neck  M54.2 723.1   2. Hx of fusion of cervical spine  Z98.1 V45.4         Subjective Questionnaire: NDI:56%      Subjective 75 yo female s/p c 3-6 fusion 25. Per office note by PA, pt restricted to 10 lb lifting and avoid cervical rotation and SB. Pt with a long history of neck pain, eventually progressed to LE pain and weakness. LE symptoms have improved post op. 0/10 pain now and 7/10 at worst, primarily along the R upper trap and clavicle area. Denies further symptoms. Has been going on short walks around the house, which is limited by neck pain. Using stimulator as directed.  MD follow up: 25  Social hx: Retired from office work and [employer]       Objective          Active Range of Motion   Left Shoulder   Flexion: WFL  Abduction: WFL    Right Shoulder   Flexion: WFL  Abduction: WFL    Strength/Myotome Testing     Left Shoulder     Planes of Motion   External rotation at 0°: 5     Right Shoulder     Planes of Motion   External rotation at 0°: 5     Left Elbow   Flexion: 5  Extension: 5    Right Elbow   Flexion: 5  Extension: 5    Left Wrist/Hand   Wrist extension: 5  Wrist flexion: 5    Right Wrist/Hand   Wrist extension: 5  Wrist flexion: 5      Posture is guarded.   Further testing deferred due to post op and restrictions  Six minute walk test: 350 meters  Incision healing well, no signs of drainage.  Assessment & Plan       Assessment  Impairments: abnormal or restricted ROM, impaired physical strength, lacks appropriate home exercise program and pain with function   Functional limitations: carrying objects, lifting,  pulling, pushing, uncomfortable because of pain, standing, reaching behind back, reaching overhead and unable to perform repetitive tasks   Assessment details: 75 yo female s/p multilevel cervical fusion 1/23/25 and with 10 lb lifting restriction. Pt is with a good response to treatment this date and would benefit from further evaluation and treatment to address the above impairments.    Prognosis: good    Goals  Plan Goals: Short term goals, 1 week: Tolerate HEP progression.  Voice compliance with activity modification.  Report improvement in symptoms.    LTGs, 5 weeks: Improve NDI to 30%.  Six minute walk test to be 400 meters or better.  Reports 80% improvement in activity tolerance.  Rate worst pain at 4/10 with daily activities.  Independent with HEP.    Plan  Therapy options: will be seen for skilled therapy services  Other planned modality interventions: modalities prn  Planned therapy interventions: flexibility, body mechanics training, functional ROM exercises, home exercise program, manual therapy, neuromuscular re-education, postural training, strengthening, stretching and therapeutic activities  Frequency: 2x week  Duration in weeks: 5  Treatment plan discussed with: patient  Plan details: 30 visits per POC, 90 day certification period         History # of Personal Factors and/or Comorbidities: HIGH (3+)  Examination of Body System(s): # of elements: MODERATE (3)  Clinical Presentation: EVOLVING  Clinical Decision Making: LOW       Timed:         Manual Therapy:         mins  75002;     Therapeutic Exercise:    15     mins  24690;     Neuromuscular Yuliya:    15    mins  84888;    Therapeutic Activity:          mins  46151;     Gait Training:           mins  13438;     Ultrasound:          mins  36085;    Ionto                                   mins   98560  Self Care                            mins   84690      Un-Timed:  Electrical Stimulation:         mins  83820 ( );  Dry Needling          mins  self-pay  Traction          mins 23304  Low Eval     20     Mins 98941  Mod Eval          Mins 41773  High Eval                            Mins 58382  Re-Eval          Mins 44337  Canalith Repos         mins 70802      Timed Treatment:   30   mins   Total Treatment:     50   mins          PT: Marshall Rosales PT     [place] license: [id number]  Electronically signed by Marshall Rosales, PT, 02/10/25, 11:15 AM EST    Certification Period: 2/10/2025 thru 5/10/2025  I certify that the therapy services are furnished while this patient is under my care.  The services outlined above are required by this patient, and will be reviewed every 90 days.         Physician Signature:__________________________________________________    PHYSICIAN: Cynthia Goodman PA-C  NPI: [id number]                                      DATE:      Please sign and return via fax to [email or web address] . Thank you, [facility].

## 2025-02-14 ENCOUNTER — TREATMENT (OUTPATIENT)
Dept: PHYSICAL THERAPY | Facility: CLINIC | Age: 75
End: 2025-02-14
Payer: MEDICARE

## 2025-02-14 DIAGNOSIS — M54.2 PAIN, NECK: Primary | ICD-10-CM

## 2025-02-14 DIAGNOSIS — Z98.1 HX OF FUSION OF CERVICAL SPINE: ICD-10-CM

## 2025-02-14 NOTE — PROGRESS NOTES
Physical Therapy Daily Progress Note    Patient: Mariajose Tabor   : 1950  Diagnosis/ICD-10 Code:  Pain, neck [M54.2]  Referring practitioner: Cynthia Goodman PA-C  Date of Initial Visit: Type: THERAPY  Noted: 2/10/2025  Today's Date: 2025  Patient seen for 2 sessions         Mariajose Tabor reports:  Feeling well after last visit.  Reports good tolerance with HEP.  Having intermittent headaches, but symptoms resolve quickly.        Objective   See Exercise, Manual, and Modality Logs for complete treatment.       Assessment/Plan    Good tolerance with exercise progression this visit.  Noted hypertonicity of right upper trap and paraspinals but feeling well after STM.      Progress per Plan of Care           Manual Therapy:    15     mins  58413;  Therapeutic Exercise:    25     mins  19734;     Neuromuscular Yuliya:    14    mins  67173;    Therapeutic Activity:          mins  81159;     Gait Training:           mins  24738;     Ultrasound:          mins  06185;    Electrical Stimulation:         mins  61520 ( );  Dry Needling          mins self-pay    Timed Treatment:   54   mins   Total Treatment:     54   mins    Palmer Almendarez PT  Physical Therapist

## 2025-02-17 ENCOUNTER — TREATMENT (OUTPATIENT)
Dept: PHYSICAL THERAPY | Facility: CLINIC | Age: 75
End: 2025-02-17
Payer: MEDICARE

## 2025-02-17 DIAGNOSIS — Z98.1 HX OF FUSION OF CERVICAL SPINE: ICD-10-CM

## 2025-02-17 DIAGNOSIS — M54.2 PAIN, NECK: Primary | ICD-10-CM

## 2025-02-17 PROCEDURE — 97110 THERAPEUTIC EXERCISES: CPT | Performed by: PHYSICAL THERAPIST

## 2025-02-17 PROCEDURE — 97112 NEUROMUSCULAR REEDUCATION: CPT | Performed by: PHYSICAL THERAPIST

## 2025-02-17 NOTE — PROGRESS NOTES
Physical Therapy Daily Progress Note    Patient: Mariajose Tabor   : 1950  Diagnosis/ICD-10 Code:  Pain, neck [M54.2]  Referring practitioner: Cynthia Goodman PA-C  Date of Initial Visit: Type: THERAPY  Noted: 2/10/2025  Today's Date: 2025  Patient seen for 3 sessions         Mariajose Tabor reports: Cervical spine feeling better.  Reports less tightness and soreness in right upper trapezius since last visit.    Objective   See Exercise, Manual, and Modality Logs for complete treatment.       Assessment/Plan    Good tolerance with exercise progression this visit.  Reports fatigue in BUE's at end of visit.  Progressing well.    Progress per Plan of Care           Manual Therapy:         mins  74176;  Therapeutic Exercise:    25     mins  75311;     Neuromuscular Yuliya:    15    mins  44192;    Therapeutic Activity:          mins  19378;     Gait Training:           mins  22134;     Ultrasound:          mins  24960;    Electrical Stimulation:         mins  52681 ( );  Dry Needling          mins self-pay    Timed Treatment:   40   mins   Total Treatment:     40   mins    Palmer Almendarez PT  Physical Therapist

## 2025-02-21 ENCOUNTER — TREATMENT (OUTPATIENT)
Dept: PHYSICAL THERAPY | Facility: CLINIC | Age: 75
End: 2025-02-21
Payer: MEDICARE

## 2025-02-21 DIAGNOSIS — M54.2 PAIN, NECK: Primary | ICD-10-CM

## 2025-02-21 DIAGNOSIS — Z98.1 HX OF FUSION OF CERVICAL SPINE: ICD-10-CM

## 2025-02-21 NOTE — PROGRESS NOTES
Physical Therapy Daily Progress Note    Patient: Mariajose Tabor   : 1950  Diagnosis/ICD-10 Code:  Pain, neck [M54.2]  Referring practitioner: Cynthia Goodman PA-C  Date of Initial Visit: Type: THERAPY  Noted: 2/10/2025  Today's Date: 2025  Patient seen for 4 sessions         Mariajose Tabor reports:  Continues to feel better.  Compliant with bone stimulator collar and ROM precautions.  Soreness in right upper trapezius remains resolved.    Objective   See Exercise, Manual, and Modality Logs for complete treatment.       Assessment/Plan    Tolerates exercise progression well this visit.  Demonstrates bilateral shoulder flexion (light weight) without scapular elevation compensation pattern.  Fatigued, but feeling well at end of visit.    Progress per Plan of Care           Manual Therapy:         mins  51479;  Therapeutic Exercise:    25     mins  79805;     Neuromuscular Yuliya:    15    mins  85765;    Therapeutic Activity:          mins  53339;     Gait Training:           mins  32428;     Ultrasound:          mins  17372;    Electrical Stimulation:         mins  95447 ( );  Dry Needling          mins self-pay    Timed Treatment:   40   mins   Total Treatment:     40   mins    Palmer Almendarez PT  Physical Therapist

## 2025-02-24 ENCOUNTER — TREATMENT (OUTPATIENT)
Dept: PHYSICAL THERAPY | Facility: CLINIC | Age: 75
End: 2025-02-24
Payer: MEDICARE

## 2025-02-24 DIAGNOSIS — M54.2 PAIN, NECK: Primary | ICD-10-CM

## 2025-02-24 DIAGNOSIS — Z98.1 HX OF FUSION OF CERVICAL SPINE: ICD-10-CM

## 2025-02-24 PROCEDURE — 97112 NEUROMUSCULAR REEDUCATION: CPT | Performed by: PHYSICAL THERAPIST

## 2025-02-24 PROCEDURE — 97110 THERAPEUTIC EXERCISES: CPT | Performed by: PHYSICAL THERAPIST

## 2025-02-24 PROCEDURE — 97140 MANUAL THERAPY 1/> REGIONS: CPT | Performed by: PHYSICAL THERAPIST

## 2025-02-24 NOTE — PROGRESS NOTES
Physical Therapy Daily Progress Note    Patient: Mariajose Tabor   : 1950  Diagnosis/ICD-10 Code:  Pain, neck [M54.2]  Referring practitioner: Cynthia Goodman PA-C  Date of Initial Visit: Type: THERAPY  Noted: 2/10/2025  Today's Date: 2025  Patient seen for 5 sessions         Mariajose Tabor reports:  Was able to go to her grandson's basketball games this weekend and had only mild muscle soreness.  Reports that she was also able to go to [employer] this weekend and managed symptoms well.     Objective   See Exercise, Manual, and Modality Logs for complete treatment.       Assessment/Plan    Noted soft tissue restriction over incision in anterior cervical spine.  Pt reported feeling better after STM.  Good tolerance with exercises this visit.  Progressing well.     Progress per Plan of Care           Manual Therapy:   8      mins  22563;  Therapeutic Exercise:    22     mins  69547;     Neuromuscular Yuliya:    15    mins  01942;    Therapeutic Activity:          mins  28590;     Gait Training:           mins  64694;     Ultrasound:          mins  59710;    Electrical Stimulation:         mins  26407 ( );  Dry Needling          mins self-pay    Timed Treatment:   45   mins   Total Treatment:     45   mins    Palmer Almendarez PT  Physical Therapist

## 2025-02-26 ENCOUNTER — TREATMENT (OUTPATIENT)
Dept: PHYSICAL THERAPY | Facility: CLINIC | Age: 75
End: 2025-02-26
Payer: MEDICARE

## 2025-02-26 DIAGNOSIS — Z98.1 HX OF FUSION OF CERVICAL SPINE: ICD-10-CM

## 2025-02-26 DIAGNOSIS — M54.2 PAIN, NECK: Primary | ICD-10-CM

## 2025-02-26 PROCEDURE — 97112 NEUROMUSCULAR REEDUCATION: CPT | Performed by: PHYSICAL THERAPIST

## 2025-02-26 PROCEDURE — 97110 THERAPEUTIC EXERCISES: CPT | Performed by: PHYSICAL THERAPIST

## 2025-02-26 NOTE — PROGRESS NOTES
Physical Therapy Daily Progress Note      Patient: Mariajose Tabor   : 1950  Diagnosis/ICD-10 Code:  Pain, neck [M54.2]  Referring practitioner: Cynthia Goodman PA-C  Date of Initial Visit: Type: THERAPY  Noted: 2/10/2025  Today's Date: 2025  Patient seen for 6 sessions             Subjective Pt states she is feeling ok today.  She does not want to push the Tank today if it causes increased leg symptoms like it did last visit.    Objective   See Exercise, Manual, and Modality Logs for complete treatment.       Assessment/Plan  Pt tolerated progression of resistive exercises well today.  Pt demonstrated good technique and tolerance with exercises.    Progress per Plan of Care           Timed:           Therapeutic Exercise:    23     mins  74121;     Neuromuscular Yuliya:    15    mins  13456;        Timed Treatment:   38   mins   Total Treatment:     38   mins        Ricardo Corea PTA  Physical Therapist Assistant

## 2025-03-04 ENCOUNTER — TREATMENT (OUTPATIENT)
Dept: PHYSICAL THERAPY | Facility: CLINIC | Age: 75
End: 2025-03-04
Payer: MEDICARE

## 2025-03-04 DIAGNOSIS — M54.2 PAIN, NECK: Primary | ICD-10-CM

## 2025-03-04 DIAGNOSIS — Z98.1 HX OF FUSION OF CERVICAL SPINE: ICD-10-CM

## 2025-03-04 PROCEDURE — 97110 THERAPEUTIC EXERCISES: CPT | Performed by: PHYSICAL THERAPIST

## 2025-03-04 PROCEDURE — 97112 NEUROMUSCULAR REEDUCATION: CPT | Performed by: PHYSICAL THERAPIST

## 2025-03-04 NOTE — PROGRESS NOTES
Physical Therapy Daily Treatment Note      Patient: Mariajose Tabor   : 1950  Referring practitioner: Cynthia Goodman PA-C  Date of Initial Visit: Type: THERAPY  Noted: 2/10/2025  Today's Date: 3/4/2025  Patient seen for 7 sessions       Visit Diagnoses:    ICD-10-CM ICD-9-CM   1. Pain, neck  M54.2 723.1   2. Hx of fusion of cervical spine  Z98.1 V45.4       Subjective Feeling stronger but relatively weak. HEP going well.    Objective   See Exercise, Manual, and Modality Logs for complete treatment.       Assessment/Plan Progressing well.      Timed:         Manual Therapy:         mins  22012;     Therapeutic Exercise:    30     mins  05781;     Neuromuscular Yuliya:    10    mins  34896;    Therapeutic Activity:          mins  81730;     Gait Training:           mins  72123;     Ultrasound:          mins  98956;    Ionto                                   mins   61529  Self Care                            mins   96753      Un-Timed:  Electrical Stimulation:         mins  36217 ( );  Dry Needling          mins self-pay  Traction          mins 94017  Canalith Repos         mins 28541    Timed Treatment:   40   mins   Total Treatment:     40   mins      Marshall Rosales, PT, PT, DPT, OCS  [place] license: [id number]

## 2025-03-06 ENCOUNTER — LAB (OUTPATIENT)
Dept: LAB | Facility: HOSPITAL | Age: 75
End: 2025-03-06
Payer: MEDICARE

## 2025-03-06 DIAGNOSIS — D47.2 MGUS (MONOCLONAL GAMMOPATHY OF UNKNOWN SIGNIFICANCE): ICD-10-CM

## 2025-03-06 LAB
ALBUMIN SERPL-MCNC: 4.1 G/DL (ref 3.5–5.2)
ALBUMIN/GLOB SERPL: 1.5 G/DL
ALP SERPL-CCNC: 83 U/L (ref 39–117)
ALT SERPL W P-5'-P-CCNC: 9 U/L (ref 1–33)
ANION GAP SERPL CALCULATED.3IONS-SCNC: 9.9 MMOL/L (ref 5–15)
AST SERPL-CCNC: 23 U/L (ref 1–32)
BASOPHILS # BLD AUTO: 0.02 10*3/MM3 (ref 0–0.2)
BASOPHILS NFR BLD AUTO: 0.6 % (ref 0–1.5)
BILIRUB SERPL-MCNC: 0.2 MG/DL (ref 0–1.2)
BUN SERPL-MCNC: 11 MG/DL (ref 8–23)
BUN/CREAT SERPL: 13.9 (ref 7–25)
CALCIUM SPEC-SCNC: 9.7 MG/DL (ref 8.6–10.5)
CHLORIDE SERPL-SCNC: 96 MMOL/L (ref 98–107)
CO2 SERPL-SCNC: 30.1 MMOL/L (ref 22–29)
CREAT SERPL-MCNC: 0.79 MG/DL (ref 0.57–1)
DEPRECATED RDW RBC AUTO: 47.5 FL (ref 37–54)
EGFRCR SERPLBLD CKD-EPI 2021: 79.6 ML/MIN/1.73
EOSINOPHIL # BLD AUTO: 0.05 10*3/MM3 (ref 0–0.4)
EOSINOPHIL NFR BLD AUTO: 1.4 % (ref 0.3–6.2)
ERYTHROCYTE [DISTWIDTH] IN BLOOD BY AUTOMATED COUNT: 14.5 % (ref 12.3–15.4)
GLOBULIN UR ELPH-MCNC: 2.7 GM/DL
GLUCOSE SERPL-MCNC: 84 MG/DL (ref 65–99)
HCT VFR BLD AUTO: 35.9 % (ref 34–46.6)
HGB BLD-MCNC: 11.2 G/DL (ref 12–15.9)
LYMPHOCYTES # BLD AUTO: 0.82 10*3/MM3 (ref 0.7–3.1)
LYMPHOCYTES NFR BLD AUTO: 23.6 % (ref 19.6–45.3)
MCH RBC QN AUTO: 28.8 PG (ref 26.6–33)
MCHC RBC AUTO-ENTMCNC: 31.2 G/DL (ref 31.5–35.7)
MCV RBC AUTO: 92.3 FL (ref 79–97)
MONOCYTES # BLD AUTO: 0.46 10*3/MM3 (ref 0.1–0.9)
MONOCYTES NFR BLD AUTO: 13.3 % (ref 5–12)
NEUTROPHILS NFR BLD AUTO: 2.12 10*3/MM3 (ref 1.7–7)
NEUTROPHILS NFR BLD AUTO: 61.1 % (ref 42.7–76)
PLATELET # BLD AUTO: 342 10*3/MM3 (ref 140–450)
PMV BLD AUTO: 8.7 FL (ref 6–12)
POTASSIUM SERPL-SCNC: 4.3 MMOL/L (ref 3.5–5.2)
PROT SERPL-MCNC: 6.8 G/DL (ref 6–8.5)
RBC # BLD AUTO: 3.89 10*6/MM3 (ref 3.77–5.28)
SODIUM SERPL-SCNC: 136 MMOL/L (ref 136–145)
WBC NRBC COR # BLD AUTO: 3.47 10*3/MM3 (ref 3.4–10.8)

## 2025-03-06 PROCEDURE — 85025 COMPLETE CBC W/AUTO DIFF WBC: CPT

## 2025-03-06 PROCEDURE — 80053 COMPREHEN METABOLIC PANEL: CPT | Performed by: INTERNAL MEDICINE

## 2025-03-06 PROCEDURE — 36415 COLL VENOUS BLD VENIPUNCTURE: CPT

## 2025-03-07 ENCOUNTER — TREATMENT (OUTPATIENT)
Dept: PHYSICAL THERAPY | Facility: CLINIC | Age: 75
End: 2025-03-07
Payer: MEDICARE

## 2025-03-07 DIAGNOSIS — M79.2 NEUROPATHIC PAIN: ICD-10-CM

## 2025-03-07 DIAGNOSIS — Z98.1 HX OF FUSION OF CERVICAL SPINE: ICD-10-CM

## 2025-03-07 DIAGNOSIS — M54.2 PAIN, NECK: Primary | ICD-10-CM

## 2025-03-07 LAB
ALBUMIN SERPL ELPH-MCNC: 3.6 G/DL (ref 2.9–4.4)
ALBUMIN/GLOB SERPL: 1.3 {RATIO} (ref 0.7–1.7)
ALPHA1 GLOB SERPL ELPH-MCNC: 0.2 G/DL (ref 0–0.4)
ALPHA2 GLOB SERPL ELPH-MCNC: 0.8 G/DL (ref 0.4–1)
B-GLOBULIN SERPL ELPH-MCNC: 1.2 G/DL (ref 0.7–1.3)
GAMMA GLOB SERPL ELPH-MCNC: 0.6 G/DL (ref 0.4–1.8)
GLOBULIN SER CALC-MCNC: 2.7 G/DL (ref 2.2–3.9)
KAPPA LC FREE SER-MCNC: 12.3 MG/L (ref 3.3–19.4)
KAPPA LC FREE/LAMBDA FREE SER: 0.25 {RATIO} (ref 0.26–1.65)
LABORATORY COMMENT REPORT: NORMAL
LAMBDA LC FREE SERPL-MCNC: 48.8 MG/L (ref 5.7–26.3)
M PROTEIN SERPL ELPH-MCNC: NORMAL G/DL
PROT SERPL-MCNC: 6.3 G/DL (ref 6–8.5)

## 2025-03-07 PROCEDURE — 97110 THERAPEUTIC EXERCISES: CPT | Performed by: PHYSICAL THERAPIST

## 2025-03-07 PROCEDURE — 97112 NEUROMUSCULAR REEDUCATION: CPT | Performed by: PHYSICAL THERAPIST

## 2025-03-07 NOTE — PROGRESS NOTES
Physical Therapy Daily Treatment Note      Patient: Mariajose Tabor   : 1950  Referring practitioner: Cynthia Goodman PA-C  Date of Initial Visit: Type: THERAPY  Noted: 2/10/2025  Today's Date: 3/7/2025  Patient seen for 8 sessions       Visit Diagnoses:    ICD-10-CM ICD-9-CM   1. Pain, neck  M54.2 723.1   2. Hx of fusion of cervical spine  Z98.1 V45.4       Subjective Feeling stronger. HEP going well.    Objective   See Exercise, Manual, and Modality Logs for complete treatment.       Assessment/Plan Pt progressing well to this point.      Timed:         Manual Therapy:         mins  16585;     Therapeutic Exercise:    30     mins  57300;     Neuromuscular Yuliya:    10    mins  07950;    Therapeutic Activity:          mins  25420;     Gait Training:           mins  38813;     Ultrasound:          mins  78274;    Ionto                                   mins   97311  Self Care                            mins   97961      Un-Timed:  Electrical Stimulation:         mins  27053 ( );  Dry Needling          mins self-pay  Traction          mins 78285  Canalith Repos         mins 55067    Timed Treatment:   40   mins   Total Treatment:     40   mins      Marshall Rosales, PT, PT, DPT, OCS  [place] license: [id number]

## 2025-03-10 ENCOUNTER — TREATMENT (OUTPATIENT)
Dept: PHYSICAL THERAPY | Facility: CLINIC | Age: 75
End: 2025-03-10
Payer: MEDICARE

## 2025-03-10 DIAGNOSIS — M54.2 PAIN, NECK: Primary | ICD-10-CM

## 2025-03-10 DIAGNOSIS — Z98.1 HX OF FUSION OF CERVICAL SPINE: ICD-10-CM

## 2025-03-10 PROCEDURE — 97112 NEUROMUSCULAR REEDUCATION: CPT | Performed by: PHYSICAL THERAPIST

## 2025-03-10 PROCEDURE — 97110 THERAPEUTIC EXERCISES: CPT | Performed by: PHYSICAL THERAPIST

## 2025-03-10 RX ORDER — DULOXETIN HYDROCHLORIDE 60 MG/1
60 CAPSULE, DELAYED RELEASE ORAL DAILY
Qty: 30 CAPSULE | Refills: 0 | Status: SHIPPED | OUTPATIENT
Start: 2025-03-10

## 2025-03-10 NOTE — PROGRESS NOTES
Physical Therapy Daily Progress Note      Patient: Mariajose Tabor   : 1950  Diagnosis/ICD-10 Code:  Pain, neck [M54.2]  Referring practitioner: Cynthia Goodman PA-C  Date of Initial Visit: Type: THERAPY  Noted: 2/10/2025  Today's Date: 3/10/2025  Patient seen for 9 sessions             Subjective Pt had increased leg pain/sensitivity after last visit, contributing it to increased resistance on Nu step and to sidestepping with theraband around her ankles.    Objective   See Exercise, Manual, and Modality Logs for complete treatment.       Assessment/Plan  Pt had c/o fatigue at times with UE strengthening exercises.  But overall very good tolerance and effort with session today.    Progress per Plan of Care           Timed:           Therapeutic Exercise:    30     mins  81160;     Neuromuscular Yuliya:    15    mins  79136;        Timed Treatment:   45   mins   Total Treatment:     45   mins        Ricardo Corea PTA  Physical Therapist Assistant       ----- Message from Emperatriz Russell sent at 4/1/2024  2:53 PM CDT -----  Contact: [facility] [phone] / Denise  1MEDICALADVICE     Patient is calling for Medical Advice regarding:Patient is calling need information on pending request     How long has patient had these symptoms:    Pharmacy name and phone#:    Would like response via Memet:  call     Comments:

## 2025-03-11 NOTE — PROGRESS NOTES
HEMATOLOGY ONCOLOGY OUTPATIENT FOLLOWUP       Patient name: Mariajose Tabor  : 1950  MRN: [id number]  Primary Care Physician: Lilia George MD  Referring Physician: Lilia George MD  Reason For Consult: Abnormal light chain quantification.     History of Present Illness:  2024: For the first time referred by her neurologist who, in the process of investigating symptoms of neuropathy, obtain protein electrophoresis and light chain quantification in serum. She was found to have a small excess of lambda light chains; the protein electrophoresis had no monoclonal spike. She had no previous history of a monoclonal gammopathy. She reported a history of cervical myelopathy secondary to spinal stenosis. She also reported a year and a half of dysesthesia and allodynia of the lower extremities. She described weakness that could be made worse by prolonged periods of standing up. She described having to sit down to relieve this pain and having to have her feet on the ground to relieve it. She had not had any weight loss. She denied nocturnal diaphoresis or fevers. She had a history of chronic back pain but it had not been worse. Reportedly an electromyography was unremarkable. On exam alert and conversant. No distress and no jaundice. No palpable adenopathy. Lungs diminished bilaterally and heart regular. Abdomen without hepatomegaly or splenomegaly. No edema. Reviewed the laboratory exams and discussed with her. It would appear she has monoclonal gammopathy of undetermined significance. Will investigate further.     10/16/2024: Not much better.  Still having prominent symptoms that are consistent with neuropathy.  She has continued to have investigations through the neurologist office.  She is eating well.  Her weight is stable or has increased a little bit and she has not had any fevers.  Denies chest pains or cough.  No abdominal pain or diarrhea.  On exam alert and conversant.   No distress.  No jaundice.  The lungs are clear bilaterally and the heart regular.  Abdomen soft.  No edema.  Laboratory exams reviewed and discussed with her.  No suggestion of multiple myeloma.  The bone survey is negative.  She was found to have an IgA monoclonal protein that corresponds to 0.6 g/dL.  The lambda light chains are increased at about the same degree as they were at the time of the previous measurement at 50.8 mg/L.  The ratio however is not abnormal.  Will investigate further.  She is to have a bone marrow biopsy and aspiration with [place] red staining for investigation of amyloid deposition.  She will see me with results.    11/7/2024: Feels about the same as at the time of the last visit.  Persists with discomfort in the lower extremities.  She has been offered surgery as she does have myelopathy secondary to spinal stenosis.  She has been postponing it until after the end of the year.  She is eating well.  Her appetite remains adequate and she has had no fevers.  Denies chest pains or dyspnea and has not had abdominal pain either.  The bone marrow biopsy took place without any difficulties.  She has not had any edema.  The final report of the bone marrow biopsy confirmed the presence of an abnormal population of plasma cells that constitutes approximately 15% of the nucleated cells.  The cells are lambda light chain restricted.  Karyotype reveals a 46XX complement.  The FISH panel reveals monosomy 13 and trisomy 7.  [place] red is negative.  On exam alert and conversant.  No distress.  No jaundice or pallor.  No edema.  Discussed with her the diagnosis of smoldering myeloma and suggested observation and I have asked her to see me again in approximately 4 months.  Suggested that the surgery to correct the spinal stenosis is not only justifiable but necessary particularly if it is resulting in symptoms.  I do not know that her neuropathy is the result of this but it is possible that it indeed is.   Amyloidosis does not seem to be the case.    3/13/2025: Underwent C3-C4, C4-C5 and C5-C6 cervical discectomy and arthrodesis with implantation of hardware.  Suffered no complications.  Recovered completely.  Despite that has persisted with symptoms.  Otherwise she feels well.  She has no new problems.  On exam alert and conversant.  Oriented.  No distress.  Lungs clear and no edema.  Laboratory exams reviewed.  She persists with a small monoclonal band but no progression.  No change in renal function or hypercalcemia.  She persists with mild anemia that is not any worse than before her surgery.  To continue observation.  She is to see me in 6 months.    Past Medical History:   Diagnosis Date    Arthritis     Cervical disc disorder     GERD (gastroesophageal reflux disease)     Headache, tension-type     Hypertension     Low back pain     Under my shoulder blade    Lumbosacral disc disease     Migraine     Muscle weakness (generalized) 09/21/2023    Pain 09/21/2023    in right leg    Pain in left leg 09/21/2023    Paresthesia of skin 09/21/2023    Peripheral neuropathy 3-2023     Past Surgical History:   Procedure Laterality Date    ANTERIOR CERVICAL DISCECTOMY W/ FUSION N/A 01/23/2025    Procedure: Cervical 3-6 anterior cervical discectomy and fusion;  Surgeon: Pedro Liu IV, MD;  Location: [facility] MAIN OR;  Service: Neurosurgery;  Laterality: N/A;    BASAL CELL CARCINOMA EXCISION  04/10/2024    CARPAL TUNNEL RELEASE      CATARACT EXTRACTION      COLON RESECTION  2021    COLONOSCOPY      DILATATION AND CURETTAGE      ENDOSCOPY N/A 07/07/2021    Procedure: ESOPHAGOGASTRODUODENOSCOPY with gastric biopsy x 2;  Surgeon: Ana Muñiz MD;  Location: [facility] ENDOSCOPY;  Service: Gastroenterology;  Laterality: N/A;  gastric nodule,  hiatal hernia    EPIDURAL BLOCK  2012    For neck    EXPLORATORY LAPAROTOMY N/A 03/02/2021    Procedure: LAPAROTOMY EXPLORATORY with RIGHT COLON RESECTION;  Surgeon: Pedro Wren DO;   Location: [facility] MAIN OR;  Service: General;  Laterality: N/A;    HERNIA REPAIR      THUMB ARTHROSCOPY Bilateral     thumb fusion       Current Outpatient Medications:     alendronate (FOSAMAX) 70 MG tablet, TAKE 1 TABLET BY MOUTH ONCE A WEEK IN THE MORNING AT LEAST 30 MINUTES BEFORE FIRST FOOD, BEVERAGE OR MEDICATION OF THE DAY, Disp: , Rfl:     aspirin 81 MG EC tablet, Take 1 tablet by mouth Daily., Disp: , Rfl:     benazepril (LOTENSIN) 20 MG tablet, Take 1 tablet by mouth Daily., Disp: , Rfl:     Biotin 10 MG capsule, Take  by mouth., Disp: , Rfl:     calcium carbonate (OS-LILIANA) 600 MG tablet, Take 1 tablet by mouth 2 (Two) Times a Day With Meals., Disp: , Rfl:     doxycycline (PERIOSTAT) 20 MG tablet, TAKE 1 TABLET BY MOUTH WITH BREAKFAST AND 1 ONCE DAILY WITH SUPPER, Disp: , Rfl:     DULoxetine (CYMBALTA) 60 MG capsule, Take 1 capsule by mouth once daily, Disp: 30 capsule, Rfl: 0    gabapentin (NEURONTIN) 300 MG capsule, Take 1 capsule by mouth 3 (Three) Times a Day., Disp: , Rfl:     hydroCHLOROthiazide (HYDRODIURIL) 12.5 MG tablet, Take 1 tablet by mouth Daily., Disp: , Rfl:     Multiple Vitamins-Minerals (PRESERVISION AREDS 2 PO), Take 2 tablets by mouth Daily., Disp: , Rfl:     omeprazole (priLOSEC) 40 MG capsule, Take 1 capsule by mouth Daily. before a meal, Disp: , Rfl:     traZODone (DESYREL) 150 MG tablet, Take 2 tablets by mouth every night at bedtime., Disp: , Rfl:     cyclobenzaprine (FLEXERIL) 10 MG tablet, Take 1 tablet by mouth 3 (Three) Times a Day As Needed for Muscle Spasms., Disp: 45 tablet, Rfl: 0    naloxone (NARCAN) 4 MG/0.1ML nasal spray, Call 911. Don't prime. [place] in 1 nostril for overdose. Repeat in 2-3 minutes in other nostril if no or minimal breathing/responsiveness., Disp: 2 each, Rfl: 0    NON FORMULARY / PATIENT SUPPLIED MEDICATION, Insert 2 mL into the vagina 2 (Two) Times a Week. Estriol 0.5% cream-   Insert 2ml twice weekly on Tuesdays and Fridays, Disp: , Rfl:     Omega-3  Fatty Acids (fish oil) 1000 MG capsule capsule, Take 2 capsules by mouth Daily With Breakfast., Disp: , Rfl:     oxyCODONE-acetaminophen (Percocet) 5-325 MG per tablet, Take 1 tablet by mouth Every 6 (Six) Hours As Needed for Severe Pain., Disp: 24 tablet, Rfl: 0    sennosides-docusate (PERICOLACE) 8.6-50 MG per tablet, Take 2 tablets by mouth 2 (Two) Times a Day As Needed for Constipation., Disp: 20 tablet, Rfl: 0    Allergies   Allergen Reactions    Hydroxychloroquine Sulfate Other (See Comments)     Build up on cornea; affected night vision     Family History   Problem Relation Age of Onset    Heart disease Mother     Arthritis Mother     Hyperlipidemia Mother     Stomach cancer Father     Cancer Father     Kidney disease Father     Heart disease Brother      Cancer-related family history includes Cancer in her father; Stomach cancer in her father.    Social History     Tobacco Use    Smoking status: Former     Current packs/day: 0.00     Average packs/day: 0.3 packs/day for 19.2 years (5.8 ttl pk-yrs)     Types: Cigarettes     Start date:      Quit date: 3/22/2011     Years since quittin.9     Passive exposure: Past    Smokeless tobacco: Never    Tobacco comments:     Social smoker, quit 11 years ago   Vaping Use    Vaping status: Never Used   Substance Use Topics    Alcohol use: Not Currently     Alcohol/week: 7.0 standard drinks of alcohol    Drug use: Never     Types: Marijuana     Social History     Social History Narrative    Not on file     ROS:   Review of Systems   Constitutional:  Negative for activity change, appetite change, chills, diaphoresis, fatigue, fever and unexpected weight change.   HENT:  Negative for congestion, dental problem, drooling, ear discharge, ear pain, facial swelling, hearing loss, mouth sores, nosebleeds, postnasal drip, rhinorrhea, sinus pressure, sinus pain, sneezing, sore throat, tinnitus, trouble swallowing and voice change.    Eyes:  Negative for photophobia, pain,  "discharge, redness, itching and visual disturbance.   Respiratory:  Negative for apnea, cough, choking, chest tightness, shortness of breath, wheezing and stridor.    Cardiovascular:  Negative for chest pain, palpitations and leg swelling.   Gastrointestinal:  Negative for abdominal distention, abdominal pain, anal bleeding, blood in stool, constipation, diarrhea, nausea, rectal pain and vomiting.   Endocrine: Negative for cold intolerance, heat intolerance, polydipsia and polyuria.   Genitourinary:  Negative for decreased urine volume, difficulty urinating, dysuria, flank pain, frequency, genital sores, hematuria and urgency.   Musculoskeletal:  Negative for arthralgias, back pain, gait problem, joint swelling, myalgias, neck pain and neck stiffness.   Skin:  Negative for color change, pallor and rash.   Neurological:  Positive for weakness and numbness. Negative for dizziness, tremors, seizures, syncope, facial asymmetry, speech difficulty, light-headedness and headaches.   Hematological:  Negative for adenopathy. Does not bruise/bleed easily.   Psychiatric/Behavioral:  Negative for agitation, behavioral problems, confusion, decreased concentration, hallucinations, self-injury, sleep disturbance and suicidal ideas. The patient is not nervous/anxious.      Objective:    Vital Signs:  Vitals:    03/13/25 1117   BP: 106/66   Pulse: 71   Temp: 98 °F (36.7 °C)   SpO2: 97%   Weight: 59 kg (130 lb)   Height: 160 cm (62.99\")   PainSc: 0-No pain     Body mass index is 23.03 kg/m².    ECOG  (1) Restricted in physically strenuous activity, ambulatory and able to do work of light nature    Physical Exam:   Physical Exam  Constitutional:       General: She is not in acute distress.     Appearance: She is not ill-appearing, toxic-appearing or diaphoretic.      Comments: Well built, slender and in no distress. No jaundice.    HENT:      Head: Normocephalic and atraumatic.      Right Ear: External ear normal.      Left Ear: " External ear normal.      Nose: Nose normal.      Mouth/Throat:      Mouth: Mucous membranes are moist.      Pharynx: Oropharynx is clear. No oropharyngeal exudate or posterior oropharyngeal erythema.   Eyes:      General: No scleral icterus.        Right eye: No discharge.         Left eye: No discharge.      Conjunctiva/sclera: Conjunctivae normal.      Pupils: Pupils are equal, round, and reactive to light.   Cardiovascular:      Rate and Rhythm: Normal rate and regular rhythm.      Pulses: Normal pulses.      Heart sounds: No murmur heard.     No friction rub. No gallop.   Pulmonary:      Effort: No respiratory distress.      Breath sounds: No stridor. No wheezing, rhonchi or rales.   Abdominal:      General: Bowel sounds are normal. There is no distension.      Palpations: Abdomen is soft. There is no mass.      Tenderness: There is no abdominal tenderness. There is no right CVA tenderness, left CVA tenderness, guarding or rebound.      Hernia: No hernia is present.   Musculoskeletal:         General: No tenderness, deformity or signs of injury.      Cervical back: No rigidity.      Right lower leg: No edema.      Left lower leg: No edema.   Lymphadenopathy:      Cervical: No cervical adenopathy.   Skin:     Coloration: Skin is not jaundiced or pale.      Findings: No bruising, lesion or rash.   Neurological:      General: No focal deficit present.      Mental Status: She is alert and oriented to person, place, and time.      Cranial Nerves: No cranial nerve deficit.   Psychiatric:         Mood and Affect: Mood normal.         Behavior: Behavior normal.         Thought Content: Thought content normal.         Judgment: Judgment normal.     CANDY Real MD performed the physical exam on 3/13/2025 as documented above.    Lab Results - Last 18 Months   Lab Units 03/06/25  1059 01/24/25  0103 01/14/25  1119   WBC 10*3/mm3 3.47 5.71 2.71*   HEMOGLOBIN g/dL 11.2* 10.8* 10.5*   HEMATOCRIT % 35.9 33.6* 33.8*    PLATELETS 10*3/mm3 342 249 329   MCV fL 92.3 88.7 89.7     Lab Results - Last 18 Months   Lab Units 03/06/25  1059 01/24/25  0103 01/14/25  1119 09/11/24  1221 12/04/23  1040   SODIUM mmol/L 136 133* 133* 134* 135*   POTASSIUM mmol/L 4.3 4.7 4.9 4.4 4.9   CHLORIDE mmol/L 96* 96* 96* 95* 98   CO2 mmol/L 30.1* 27.4 30.0* 28.6 30.0*   BUN mg/dL 11 16 13 17 14   CREATININE mg/dL 0.79 0.72 0.78 0.79 0.86   CALCIUM mg/dL 9.7 8.9 8.7 9.3 9.3   BILIRUBIN mg/dL 0.2  --   --  0.2 0.3   ALK PHOS U/L 83  --   --  73 80   ALT (SGPT) U/L 9  --   --  6 10   AST (SGOT) U/L 23  --   --  24 15   GLUCOSE mg/dL 84 162* 67 86 87     Lab Results   Component Value Date    GLUCOSE 84 03/06/2025    BUN 11 03/06/2025    CREATININE 0.79 03/06/2025    EGFRIFNONA 77 07/07/2021    BCR 13.9 03/06/2025    K 4.3 03/06/2025    CO2 30.1 (H) 03/06/2025    CALCIUM 9.7 03/06/2025    ALBUMIN 4.1 03/06/2025    ALBUMIN 3.6 03/06/2025    AST 23 03/06/2025    ALT 9 03/06/2025     Lab Results   Component Value Date    FOLATE >20.00 05/30/2024     Lab Results   Component Value Date    WODPMORC12 >2,000 (H) 05/30/2024     Lab Results   Component Value Date    SPEP Comment 05/30/2024     LDH   Date Value Ref Range Status   05/30/2024 84 (L) 135 - 214 U/L Final     Lab Results   Component Value Date    TIO Positive (A) 12/04/2023    SEDRATE 7 05/30/2024     Lab Results   Component Value Date    SEDRATE 7 05/30/2024      Assessment & Plan     IgA lambda smoldering myeloma.  No lytic lesions, renal function intact, no hypercalcemia but mild normocytic anemia.  At this point I believe it reasonable to continue to observe particularly because the number of plasma cells in the bone marrow is relatively low.  No evidence of amyloidosis at this time but I will obtain the biopsies of the small nerve fibers.  Neuropathy: Potentially the result of cervical spine stenosis.  Anemia: Unchanged.  Reviewed the laboratory exams and discussed with her.  See me in approximately  6 months with new laboratory exams.    Elroy Real MD on 11/7/2024 at 1455.

## 2025-03-12 ENCOUNTER — TREATMENT (OUTPATIENT)
Dept: PHYSICAL THERAPY | Facility: CLINIC | Age: 75
End: 2025-03-12
Payer: MEDICARE

## 2025-03-12 DIAGNOSIS — M54.2 PAIN, NECK: Primary | ICD-10-CM

## 2025-03-12 DIAGNOSIS — Z98.1 HX OF FUSION OF CERVICAL SPINE: ICD-10-CM

## 2025-03-12 PROCEDURE — 97110 THERAPEUTIC EXERCISES: CPT | Performed by: PHYSICAL THERAPIST

## 2025-03-12 PROCEDURE — 97112 NEUROMUSCULAR REEDUCATION: CPT | Performed by: PHYSICAL THERAPIST

## 2025-03-12 NOTE — PROGRESS NOTES
Physical Therapy Daily Treatment Note      Patient: Mariajose Tabor   : 1950  Referring practitioner: Cynthia Goodman PA-C  Date of Initial Visit: Type: THERAPY  Noted: 2/10/2025  Today's Date: 3/12/2025  Patient seen for 10 sessions       Visit Diagnoses:    ICD-10-CM ICD-9-CM   1. Pain, neck  M54.2 723.1   2. Hx of fusion of cervical spine  Z98.1 V45.4       Subjective More LE stiffness and soreness this week. HEP going well. Seeing oncologist this week.    Objective   See Exercise, Manual, and Modality Logs for complete treatment.       Assessment/Plan Fatigued post visit. Tolerated LE stretching well.       Timed:         Manual Therapy:         mins  13791;     Therapeutic Exercise:    30     mins  52686;     Neuromuscular Yuliya:    15    mins  35444;    Therapeutic Activity:          mins  40602;     Gait Training:           mins  90044;     Ultrasound:          mins  03912;    Ionto                                   mins   85645  Self Care                            mins   45538      Un-Timed:  Electrical Stimulation:         mins  52126 ( );  Dry Needling          mins self-pay  Traction          mins 23822  Canalith Repos         mins 01144    Timed Treatment:   45   mins   Total Treatment:     45   mins      Marshall Rosales, PT, PT, DPT, OCS  [place] license: [id number]

## 2025-03-13 ENCOUNTER — OFFICE VISIT (OUTPATIENT)
Dept: ONCOLOGY | Facility: CLINIC | Age: 75
End: 2025-03-13
Payer: MEDICARE

## 2025-03-13 VITALS
OXYGEN SATURATION: 97 % | HEART RATE: 71 BPM | BODY MASS INDEX: 23.04 KG/M2 | SYSTOLIC BLOOD PRESSURE: 106 MMHG | WEIGHT: 130 LBS | DIASTOLIC BLOOD PRESSURE: 66 MMHG | TEMPERATURE: 98 F | HEIGHT: 63 IN

## 2025-03-13 DIAGNOSIS — D47.2 MGUS (MONOCLONAL GAMMOPATHY OF UNKNOWN SIGNIFICANCE): Primary | ICD-10-CM

## 2025-03-17 ENCOUNTER — TREATMENT (OUTPATIENT)
Dept: PHYSICAL THERAPY | Facility: CLINIC | Age: 75
End: 2025-03-17
Payer: MEDICARE

## 2025-03-17 DIAGNOSIS — M54.2 PAIN, NECK: Primary | ICD-10-CM

## 2025-03-17 DIAGNOSIS — Z98.1 HX OF FUSION OF CERVICAL SPINE: ICD-10-CM

## 2025-03-17 PROCEDURE — 97110 THERAPEUTIC EXERCISES: CPT | Performed by: PHYSICAL THERAPIST

## 2025-03-17 PROCEDURE — 97112 NEUROMUSCULAR REEDUCATION: CPT | Performed by: PHYSICAL THERAPIST

## 2025-03-17 NOTE — PROGRESS NOTES
Physical Therapy Daily Treatment Note      Patient: Mariajose Tabor   : 1950  Referring practitioner: Cynthia Goodman PA-C  Date of Initial Visit: Type: THERAPY  Noted: 2/10/2025  Today's Date: 3/17/2025  Patient seen for 11 sessions       Visit Diagnoses:    ICD-10-CM ICD-9-CM   1. Pain, neck  M54.2 723.1   2. Hx of fusion of cervical spine  Z98.1 V45.4       Subjective No new complaints vs last visit. Pt feels her HEP is going well.    Objective   See Exercise, Manual, and Modality Logs for complete treatment.       Assessment/Plan Progressing well.      Timed:         Manual Therapy:         mins  74172;     Therapeutic Exercise:    30     mins  08609;     Neuromuscular Yuliya:    8    mins  50279;    Therapeutic Activity:          mins  11794;     Gait Training:           mins  12715;     Ultrasound:          mins  84078;    Ionto                                   mins   29128  Self Care                            mins   93935      Un-Timed:  Electrical Stimulation:         mins  28946 ( );  Dry Needling          mins self-pay  Traction          mins 02508  Canalith Repos         mins 93739    Timed Treatment:   38   mins   Total Treatment:     38   mins      Marshall Rosales, PT, PT, DPT, OCS  [place] license: [id number]

## 2025-03-19 ENCOUNTER — TREATMENT (OUTPATIENT)
Dept: PHYSICAL THERAPY | Facility: CLINIC | Age: 75
End: 2025-03-19
Payer: MEDICARE

## 2025-03-19 DIAGNOSIS — Z98.1 HX OF FUSION OF CERVICAL SPINE: ICD-10-CM

## 2025-03-19 DIAGNOSIS — M54.2 PAIN, NECK: Primary | ICD-10-CM

## 2025-03-19 PROCEDURE — 97112 NEUROMUSCULAR REEDUCATION: CPT | Performed by: PHYSICAL THERAPIST

## 2025-03-19 PROCEDURE — 97110 THERAPEUTIC EXERCISES: CPT | Performed by: PHYSICAL THERAPIST

## 2025-03-19 NOTE — PROGRESS NOTES
Physical Therapy Daily Progress Note      Patient: Mariajose Tabor   : 1950  Diagnosis/ICD-10 Code:  Pain, neck [M54.2]  Referring practitioner: Cynthia Goodman PA-C  Date of Initial Visit: Type: THERAPY  Noted: 2/10/2025  Today's Date: 3/19/2025  Patient seen for 12 sessions             Subjective Neck is feeling stronger especially when she transfers supine to sit.    Objective   See Exercise, Manual, and Modality Logs for complete treatment.       Assessment/Plan  Pt is responding very well to progression of resistance reps/weights.  Only c/o fatigue/soreness in her neck was her third set of standing bicep curls.    Progress per Plan of Care           Timed:           Therapeutic Exercise:    38     mins  10504;     Neuromuscular Yuliya:    15    mins  65247;        Timed Treatment:   53   mins   Total Treatment:     53   mins        Ricardo Corea PTA  Physical Therapist Assistant

## 2025-03-25 ENCOUNTER — TREATMENT (OUTPATIENT)
Dept: PHYSICAL THERAPY | Facility: CLINIC | Age: 75
End: 2025-03-25
Payer: MEDICARE

## 2025-03-25 DIAGNOSIS — Z98.1 HX OF FUSION OF CERVICAL SPINE: ICD-10-CM

## 2025-03-25 DIAGNOSIS — M54.2 PAIN, NECK: Primary | ICD-10-CM

## 2025-03-25 PROCEDURE — 97112 NEUROMUSCULAR REEDUCATION: CPT | Performed by: PHYSICAL THERAPIST

## 2025-03-25 PROCEDURE — 97110 THERAPEUTIC EXERCISES: CPT | Performed by: PHYSICAL THERAPIST

## 2025-03-25 NOTE — PROGRESS NOTES
Physical Therapy Daily Treatment Note      Patient: Mariajose Tabor   : 1950  Referring practitioner: Cynthia Goodman PA-C  Date of Initial Visit: Type: THERAPY  Noted: 2/10/2025  Today's Date: 3/25/2025  Patient seen for 13 sessions       Visit Diagnoses:    ICD-10-CM ICD-9-CM   1. Pain, neck  M54.2 723.1   2. Hx of fusion of cervical spine  Z98.1 V45.4       Subjective Feeling stronger. HEP going well.     Objective   See Exercise, Manual, and Modality Logs for complete treatment.       Assessment/Plan Progressing well to this point..      Timed:         Manual Therapy:         mins  06730;     Therapeutic Exercise:    30     mins  16799;     Neuromuscular Yuliya:    10    mins  58937;    Therapeutic Activity:          mins  67181;     Gait Training:           mins  63871;     Ultrasound:          mins  10998;    Ionto                                   mins   45993  Self Care                            mins   80870      Un-Timed:  Electrical Stimulation:         mins  77057 ( );  Dry Needling          mins self-pay  Traction          mins 09431  Canalith Repos         mins 06343    Timed Treatment:   40   mins   Total Treatment:     40   mins      Marshall Rosales, PT, PT, DPT, OCS  [place] license: [id number]

## 2025-03-27 ENCOUNTER — TREATMENT (OUTPATIENT)
Dept: PHYSICAL THERAPY | Facility: CLINIC | Age: 75
End: 2025-03-27
Payer: MEDICARE

## 2025-03-27 DIAGNOSIS — M54.2 PAIN, NECK: Primary | ICD-10-CM

## 2025-03-27 DIAGNOSIS — Z98.1 HX OF FUSION OF CERVICAL SPINE: ICD-10-CM

## 2025-03-27 PROCEDURE — 97112 NEUROMUSCULAR REEDUCATION: CPT | Performed by: PHYSICAL THERAPIST

## 2025-03-27 PROCEDURE — 97110 THERAPEUTIC EXERCISES: CPT | Performed by: PHYSICAL THERAPIST

## 2025-03-27 NOTE — PROGRESS NOTES
Physical Therapy Re Certification Of Plan of Care  Patient: Mariajose Tabor   : 1950  Diagnosis/ICD-10 Code:  Pain, neck [M54.2]  Referring practitioner: Cynthia Goodman PA-C  Date of Initial Visit: Type: THERAPY  Noted: 2/10/2025  Today's Date: 3/27/2025  Patient seen for 14 sessions         Visit Diagnoses:    ICD-10-CM ICD-9-CM   1. Pain, neck  M54.2 723.1   2. Hx of fusion of cervical spine  Z98.1 V45.4         Subjective Questionnaire: NDI:8%  Clinical Progress: improved  Home Program Compliance: Yes  Treatment has included: therapeutic exercise, neuromuscular re-education, and therapeutic activity      Subjective Pt now rates worst pain at 2/10 with daily activities. She feels she is 80-85% better to this point and HEP is going well.      Objective   Active Range of Motion   Left Shoulder   Flexion: WFL  Abduction: WFL     Right Shoulder   Flexion: WFL  Abduction: WFL     Strength/Myotome Testing      Left Shoulder      Planes of Motion   External rotation at 0°: 5      Right Shoulder      Planes of Motion   External rotation at 0°: 5      Left Elbow   Flexion: 5  Extension: 5     Right Elbow   Flexion: 5  Extension: 5     Left Wrist/Hand   Wrist extension: 5  Wrist flexion: 5     Right Wrist/Hand   Wrist extension: 5  Wrist flexion: 5      Posture is guarded.   Further testing deferred due to post op and restrictions  Six minute walk test: 420 meters    Assessment/Plan  Pt has met her therapy goals to this point and is primarily limited by post op restrictions. Pt to continue with independent HEP until after her 25 MD visit. Will reassess and determine therapy needs after follow up and pending MD visit.    Short term goals, 1 week: Tolerate HEP progression. met  Voice compliance with activity modification. met  Report improvement in symptoms. met     LTGs, 5 weeks: Improve NDI to 30%. met  Six minute walk test to be 400 meters or better. met  Reports 80% improvement in activity tolerance.  met  Rate worst pain at 4/10 with daily activities. met  Independent with HEP.  met    Pt doing well to this point. She is to continue independent HEP until after MD visit then follow up here to continue post op rehab as appropriate.  30 visits per POC, 90 day certification period       Timed:         Manual Therapy:         mins  35464;     Therapeutic Exercise:    30     mins  82242;     Neuromuscular Yuliya:    10    mins  46000;    Therapeutic Activity:          mins  09688;     Gait Training:           mins  22187;     Ultrasound:          mins  32625;    Ionto                                   mins   53813  Self Care                            mins   22359    Un-Timed:  Electrical Stimulation:         mins  45434 ( );  Dry Needling          mins self-pay  Traction          mins 41914  Re-Eval                               mins  08932  Canalith Repos         mins 12832    Timed Treatment:   40   mins   Total Treatment:     40   mins          PT: Marshall Rosales PT , DPT, OCS  [place] license: [id number]    Electronically signed by Marshall Rosales PT, 03/27/25, 11:05 AM EDT    Certification Period: 3/27/2025 thru 6/24/2025  I certify that the therapy services are furnished while this patient is under my care.  The services outlined above are required by this patient, and will be reviewed every 90 days.         Physician Signature:__________________________________________________    PHYSICIAN: Cynthia Goodman PA-C  NPI: [id number]                                      DATE:  :     Please sign and return via fax to [email or web address] . Thank you, [facility]

## 2025-04-04 ENCOUNTER — HOSPITAL ENCOUNTER (OUTPATIENT)
Dept: CT IMAGING | Facility: HOSPITAL | Age: 75
Discharge: HOME | End: 2025-04-04
Payer: MEDICARE

## 2025-04-04 DIAGNOSIS — Z98.1 S/P CERVICAL SPINAL FUSION: ICD-10-CM

## 2025-04-04 PROCEDURE — 72125 CT NECK SPINE W/O DYE: CPT

## 2025-04-05 DIAGNOSIS — M79.2 NEUROPATHIC PAIN: ICD-10-CM

## 2025-04-07 RX ORDER — DULOXETIN HYDROCHLORIDE 60 MG/1
60 CAPSULE, DELAYED RELEASE ORAL DAILY
Qty: 30 CAPSULE | Refills: 0 | Status: SHIPPED | OUTPATIENT
Start: 2025-04-07 | End: 2025-04-08

## 2025-04-08 RX ORDER — DULOXETIN HYDROCHLORIDE 60 MG/1
60 CAPSULE, DELAYED RELEASE ORAL DAILY
Qty: 30 CAPSULE | Refills: 5 | Status: SHIPPED | OUTPATIENT
Start: 2025-04-08

## 2025-04-11 NOTE — PROGRESS NOTES
"Subjective   History of Present Illness: Mariajose Tabor is a 74 y.o. female is here today for surgical follow-up with a new Cervical CT. Today patient reports she is doing well, she has minimal neck pain at times.  Overall she is doing well with significant improvement of neck pain and occipital headache.    Chief Complaint   Patient presents with    Follow-up          Previous treatment: PO PT, Flexeril, Percocet    Previous neurosurgery:  1/23/2025- C3-6     Previous injections:     The following portions of the patient's history were reviewed and updated as appropriate: allergies, current medications, past family history, past medical history, past social history, past surgical history, and problem list.    Review of Systems   Constitutional:  Positive for activity change.   Respiratory:  Negative for chest tightness and shortness of breath.    Cardiovascular:  Negative for chest pain.   Musculoskeletal:  Positive for myalgias. Negative for neck pain.   Neurological:  Negative for weakness and numbness.       Objective      Pulse 75   Temp 97 °F (36.1 °C)   Resp 18   Ht 160 cm (62.99\")   Wt 59 kg (130 lb)   BMI 23.04 kg/m²    Body mass index is 23.04 kg/m².  There were no vitals filed for this visit.      Neurological Exam        Assessment & Plan   Independent Review of Radiographic Studies:      I personally reviewed and interpreted the images from the following studies.    CT cervical spine: Hardware intact and in good positioning with evidence of developing few    Medical Decision Making:      Mariajose Tabor is a 74 y.o. female status post C3-6 ACDF overall doing well at 3 months postop.  She should continue to limit bending and twisting her neck but may begin to slowly ramp up activity.  We will see her back in 3 months.  If she is doing well we can lift all restrictions      Diagnoses and all orders for this visit:    1. S/P cervical spinal fusion (Primary)      No follow-ups on file.    This patient " was examined wearing appropriate personal protective equipment.                      Dr. Pedro Liu IV    04/14/25  10:58 EDT

## 2025-04-14 ENCOUNTER — OFFICE VISIT (OUTPATIENT)
Dept: NEUROSURGERY | Facility: CLINIC | Age: 75
End: 2025-04-14
Payer: MEDICARE

## 2025-04-14 ENCOUNTER — TELEPHONE (OUTPATIENT)
Dept: ORTHOPEDICS | Facility: OTHER | Age: 75
End: 2025-04-14
Payer: MEDICARE

## 2025-04-14 VITALS
TEMPERATURE: 97 F | HEIGHT: 63 IN | RESPIRATION RATE: 18 BRPM | WEIGHT: 130 LBS | BODY MASS INDEX: 23.04 KG/M2 | HEART RATE: 75 BPM

## 2025-04-14 DIAGNOSIS — Z98.1 S/P CERVICAL SPINAL FUSION: Primary | ICD-10-CM

## 2025-04-14 PROCEDURE — 99024 POSTOP FOLLOW-UP VISIT: CPT | Performed by: NEUROLOGICAL SURGERY

## 2025-04-14 PROCEDURE — 1159F MED LIST DOCD IN RCRD: CPT | Performed by: NEUROLOGICAL SURGERY

## 2025-04-14 PROCEDURE — 1160F RVW MEDS BY RX/DR IN RCRD: CPT | Performed by: NEUROLOGICAL SURGERY

## 2025-07-11 NOTE — PROGRESS NOTES
"Subjective   History of Present Illness: Mariajose Tabor is a 75 y.o. female is here today for 6 month surgical follow-up. Today patient reports some remaining left sided neck pain.  Overall she is doing well with no significant issues      Chief Complaint   Patient presents with    Neck Pain          Previous treatment:   Muscle Relaxants, Post-op PT, Narcotic's, and Rest    Previous neurosurgery:  1/23/2025- C3-6     Previous injections:     The following portions of the patient's history were reviewed and updated as appropriate: allergies, current medications, past family history, past medical history, past social history, past surgical history, and problem list.    Review of Systems   Constitutional: Negative.    HENT: Negative.     Eyes: Negative.    Respiratory: Negative.     Cardiovascular: Negative.    Gastrointestinal: Negative.    Endocrine: Negative.    Genitourinary: Negative.    Musculoskeletal:  Positive for arthralgias, myalgias and neck pain (left sided).   Skin: Negative.    Allergic/Immunologic: Negative.    Neurological: Negative.    Hematological: Negative.    Psychiatric/Behavioral: Negative.         Objective      /68   Pulse 82   Ht 160 cm (62.99\")   Wt 62.1 kg (137 lb)   SpO2 99%   BMI 24.28 kg/m²    Body mass index is 24.28 kg/m².  Vitals:    07/14/25 1046   PainSc: 5          Neurological Exam        Assessment & Plan   Independent Review of Radiographic Studies:      I personally reviewed and interpreted the images from the following studies.    No new imaging      Medical Decision Making:      Mariajose Tabor is a 75 y.o. female status post 3 level ACDF for significant central stenosis overall doing well at 6 months postop.  Patient can ramp up to normal activity.  I will see her back as needed in the future      Diagnoses and all orders for this visit:    1. S/P cervical spinal fusion (Primary)      No follow-ups on file.    This patient was examined wearing appropriate " personal protective equipment.                      Dr. Pedro Liu IV    07/14/25  11:06 EDT

## 2025-07-14 ENCOUNTER — OFFICE VISIT (OUTPATIENT)
Dept: NEUROSURGERY | Facility: CLINIC | Age: 75
End: 2025-07-14
Payer: MEDICARE

## 2025-07-14 VITALS
HEIGHT: 63 IN | OXYGEN SATURATION: 99 % | DIASTOLIC BLOOD PRESSURE: 68 MMHG | HEART RATE: 82 BPM | WEIGHT: 137 LBS | BODY MASS INDEX: 24.27 KG/M2 | SYSTOLIC BLOOD PRESSURE: 136 MMHG

## 2025-07-14 DIAGNOSIS — Z98.1 S/P CERVICAL SPINAL FUSION: Primary | ICD-10-CM

## 2025-07-14 PROCEDURE — 1160F RVW MEDS BY RX/DR IN RCRD: CPT | Performed by: NEUROLOGICAL SURGERY

## 2025-07-14 PROCEDURE — 1159F MED LIST DOCD IN RCRD: CPT | Performed by: NEUROLOGICAL SURGERY

## 2025-07-14 PROCEDURE — 99212 OFFICE O/P EST SF 10 MIN: CPT | Performed by: NEUROLOGICAL SURGERY

## 2025-07-14 PROCEDURE — 3078F DIAST BP <80 MM HG: CPT | Performed by: NEUROLOGICAL SURGERY

## 2025-07-14 PROCEDURE — 3075F SYST BP GE 130 - 139MM HG: CPT | Performed by: NEUROLOGICAL SURGERY

## 2025-07-14 RX ORDER — MELOXICAM 15 MG/1
TABLET ORAL
COMMUNITY
Start: 2025-07-10

## (undated) DEVICE — TOWEL,OR,DSP,ST,WHITE,DLX,4/PK,20PK/CS: Brand: MEDLINE

## (undated) DEVICE — TUBING, SUCTION, 1/4" X 12', STRAIGHT: Brand: MEDLINE

## (undated) DEVICE — KT SURG TURNOVER 050

## (undated) DEVICE — KT SYR GEL ORISE SNGL PK 10ML

## (undated) DEVICE — PENCL EVAC ULTRAVAC SMOKE W/BLD

## (undated) DEVICE — BLAKE SILICONE DRAIN, 15 FR ROUND, HUBLESS: Brand: BLAKE

## (undated) DEVICE — SOL IRRIG H2O 1000ML STRL

## (undated) DEVICE — SYR LUERLOK 30CC

## (undated) DEVICE — SHEET, DRAPE, SPLIT, STERILE: Brand: MEDLINE

## (undated) DEVICE — BITEBLOCK ENDO W/STRAP 60F A/ LF DISP

## (undated) DEVICE — ELECTRD BLD EZ CLN MOD 4IN

## (undated) DEVICE — PK BASIC SPINE 50

## (undated) DEVICE — GOWN,REINFORCE,POLY,SIRUS,BREATH SLV,XLG: Brand: MEDLINE

## (undated) DEVICE — DRN WND EVAC BULB 100CC

## (undated) DEVICE — SPNG LAP PREWSH SFTPK 18X18IN STRL PK/5

## (undated) DEVICE — UNDERGLV SURG BIOGEL/PI PF SYNTH SURG SZ8.5 BLU 50/BX

## (undated) DEVICE — MICRO HVTSA, 0.5G AND HVTSA SOURCEMARK PRODUCT CODE M1206 AND M1206-01: Brand: EXOFIN MICRO HVTSA, 0.5G

## (undated) DEVICE — BLANKT WARM UPPR/BDY ARM/OUT 57X196CM

## (undated) DEVICE — DRP OPMI 326071

## (undated) DEVICE — DRSNG WND BORDR/ADHS NONADHR/GZ LF 4X4IN STRL

## (undated) DEVICE — GLV SURG BIOGEL SENSR LTX PF SZ7.5

## (undated) DEVICE — SUT ETHLN 2/0 PS 18IN 585H

## (undated) DEVICE — ELECTRD BLD EZ CLN MOD 2.5IN

## (undated) DEVICE — PK ENDO GI 50

## (undated) DEVICE — SOLUTION,WATER,IRRIGATION,1000ML,STERILE: Brand: MEDLINE

## (undated) DEVICE — SMOKE EVACUATION TUBING WITH 7/8 IN TO 1/4 IN REDUCER: Brand: BUFFALO FILTER

## (undated) DEVICE — DISTRACT SCRW 12MM STRL

## (undated) DEVICE — GLV SURG BIOGEL LTX PF 8

## (undated) DEVICE — SUT SILK 3/0 SH CR8 30IN C017D

## (undated) DEVICE — 450 ML BOTTLE OF 0.05% CHLORHEXIDINE GLUCONATE IN 99.95% STERILE WATER FOR IRRIGATION, USP AND APPLICATOR.: Brand: IRRISEPT ANTIMICROBIAL WOUND LAVAGE

## (undated) DEVICE — DRSNG SURESITE123 6X8IN

## (undated) DEVICE — ELECTRD BLD EZ CLN MOD 6.5IN

## (undated) DEVICE — ENSEAL 20 CM SHAFT, LARGE JAW: Brand: ENSEAL X1

## (undated) DEVICE — TP SXN YANKR BULB STRL

## (undated) DEVICE — SUT VIC 3/0 SH 27IN J416H

## (undated) DEVICE — SUT ETHIB 1 CT1 30IN  X425H

## (undated) DEVICE — 3.0MM PRECISION NEURO (MATCH HEAD)

## (undated) DEVICE — SPONGE,NEURO,1"X1",XR,STRL,LF,10/PK: Brand: MEDLINE

## (undated) DEVICE — DRP C/ARMOR

## (undated) DEVICE — RESTRNT LIMB FOAM 2STRAP

## (undated) DEVICE — ANTIBACTERIAL UNDYED BRAIDED (POLYGLACTIN 910), SYNTHETIC ABSORBABLE SUTURE: Brand: COATED VICRYL

## (undated) DEVICE — SOL IRR NACL 0.9PCT BO 1000ML

## (undated) DEVICE — CVR HNDL LT CAM LB54

## (undated) DEVICE — SOL IRRIG NACL 1000ML

## (undated) DEVICE — SLV SCD CALF HEMOFORCE DVT THERP REPROC MD

## (undated) DEVICE — DECANTER: Brand: UNBRANDED

## (undated) DEVICE — HEAD SUPPORT 1358225 BIOFLEK SURGICAL: Brand: BIO-FLEK

## (undated) DEVICE — CVR HNDL LT SURG ACCSSRY BLU STRL

## (undated) DEVICE — COVER,MAYO STAND,STERILE: Brand: MEDLINE

## (undated) DEVICE — PK MAJ LAPAROTOMY 50

## (undated) DEVICE — SINGLE-USE BIOPSY FORCEPS: Brand: RADIAL JAW 4